# Patient Record
Sex: FEMALE | Race: BLACK OR AFRICAN AMERICAN | Employment: OTHER | URBAN - METROPOLITAN AREA
[De-identification: names, ages, dates, MRNs, and addresses within clinical notes are randomized per-mention and may not be internally consistent; named-entity substitution may affect disease eponyms.]

---

## 2017-01-18 ENCOUNTER — OFFICE VISIT (OUTPATIENT)
Dept: INTERNAL MEDICINE CLINIC | Age: 82
End: 2017-01-18

## 2017-01-18 VITALS
WEIGHT: 133 LBS | HEIGHT: 64 IN | BODY MASS INDEX: 22.71 KG/M2 | SYSTOLIC BLOOD PRESSURE: 131 MMHG | OXYGEN SATURATION: 95 % | DIASTOLIC BLOOD PRESSURE: 69 MMHG | TEMPERATURE: 98.1 F | HEART RATE: 85 BPM

## 2017-01-18 DIAGNOSIS — D50.9 IRON DEFICIENCY ANEMIA, UNSPECIFIED IRON DEFICIENCY ANEMIA TYPE: ICD-10-CM

## 2017-01-18 DIAGNOSIS — E55.9 VITAMIN D DEFICIENCY: ICD-10-CM

## 2017-01-18 DIAGNOSIS — I50.20 SYSTOLIC CONGESTIVE HEART FAILURE, UNSPECIFIED CONGESTIVE HEART FAILURE CHRONICITY: ICD-10-CM

## 2017-01-18 DIAGNOSIS — E78.00 PURE HYPERCHOLESTEROLEMIA: ICD-10-CM

## 2017-01-18 DIAGNOSIS — E87.5 HYPERKALEMIA: ICD-10-CM

## 2017-01-18 DIAGNOSIS — I10 ESSENTIAL HYPERTENSION: ICD-10-CM

## 2017-01-18 NOTE — PATIENT INSTRUCTIONS
Increase vitamin D to 2000units daily     Labs today     Schedule eye exam     Blood pressure pills are coreg 25mg(2of these)  2 times per day, lisinopril 10mg daily , amlodipine 10mg daily

## 2017-01-18 NOTE — MR AVS SNAPSHOT
Visit Information Date & Time Provider Department Dept. Phone Encounter #  
 1/18/2017 10:00 AM Jolena Ganser, [phone] [id number] Follow-up Instructions Return in about 3 months (around 4/18/2017). Upcoming Health Maintenance Date Due DTaP/Tdap/Td series (1 - Tdap) 4/6/2011 EYE EXAM RETINAL OR DILATED Q1 4/21/2015 Pneumococcal 65+ Low/Medium Risk (2 of 2 - PPSV23) 3/20/2016 GLAUCOMA SCREENING Q2Y 4/21/2016 FOOT EXAM Q1 9/16/2016 HEMOGLOBIN A1C Q6M 2/3/2017 LIPID PANEL Q1 4/27/2017 MEDICARE YEARLY EXAM 4/28/2017 MICROALBUMIN Q1 8/3/2017 Allergies as of 1/18/2017  Review Complete On: 1/18/2017 By: Jolena Ganser, MD  
 No Known Allergies Current Immunizations  Reviewed on 1/14/2015 Name Date Pneumococcal Vaccine (Unspecified Type) 3/20/2011  4:54 PM  
 TD Vaccine 4/5/2011 Not reviewed this visit You Were Diagnosed With   
  
 Codes Comments Uncontrolled type 2 diabetes mellitus without complication, without long-term current use of insulin ([facility] [address]    -  Primary ICD-10-CM: E11.65 ICD-9-CM: 250.02 Systolic congestive heart failure, unspecified congestive heart failure chronicity (HCC)     ICD-10-CM: I50.20 ICD-9-CM: 428.20, 428.0 Iron deficiency anemia, unspecified iron deficiency anemia type     ICD-10-CM: D50.9 ICD-9-CM: 280.9 Vitamin D deficiency     ICD-10-CM: E55.9 ICD-9-CM: 268.9 Essential hypertension     ICD-10-CM: I10 
ICD-9-CM: 401.9 Pure hypercholesterolemia     ICD-10-CM: E78.00 ICD-9-CM: 272.0 Hyperkalemia     ICD-10-CM: E87.5 ICD-9-CM: 276.7 Vitals BP Pulse Temp Height(growth percentile) Weight(growth percentile) SpO2  
 131/69 (BP 1 Location: Right arm, BP Patient Position: Sitting) 85 98.1 °F (36.7 °C) (Oral) 5' 4\" (1.626 m) 133 lb (60.3 kg) 95% BMI OB Status Smoking Status 22.83 kg/m2 Postmenopausal Never Smoker BMI and BSA Data Body Mass Index Body Surface Area  
 22.83 kg/m 2 1.65 m 2 Preferred Pharmacy Pharmacy Name Phone [facility] [address], [address] [place] [address] Avenue [phone] Your Updated Medication List  
  
   
This list is accurate as of: 1/18/17 10:19 AM.  Always use your most recent med list.  
  
  
  
  
 alcohol swabs Padm Commonly known as:  BD Single Use Swabs Regular Daily  
  
 amLODIPine 10 mg tablet Commonly known as:  Montoya Mullet TAKE 1 TABLET EVERY DAY  
  
 aspirin delayed-release 81 mg tablet Take  by mouth daily. atorvastatin 10 mg tablet Commonly known as:  LIPITOR  
TAKE 1 TABLET EVERY NIGHT Blood Glucose Control, Normal Soln Commonly known as:  ACCU-CHEK SMARTVIEW CONTRL SOL Daily  
  
 carvedilol 25 mg tablet Commonly known as:  Tony Been Take 2 Tabs by mouth two (2) times daily (with meals). ergocalciferol 50,000 unit capsule Commonly known as:  ERGOCALCIFEROL Take 1 Cap by mouth every seven (7) days. furosemide 20 mg tablet Commonly known as:  LASIX TAKE 1 TABLET DAILY  
  
 glucose blood VI test strips strip Commonly known as:  ACCU-CHEK RISSA PLUS TEST STRP Check glucose twice daily Lancets Misc Commonly known as:  ACCU-CHEK FASTCLIX Check glucose twice daily * lisinopril 10 mg tablet Commonly known as:  Mollie Ripa Take 1 Tab by mouth daily. * lisinopril 20 mg tablet Commonly known as:  PRINIVIL, ZESTRIL  
TAKE 1 TABLET EVERY DAY  
  
 metFORMIN 1,000 mg tablet Commonly known as:  GLUCOPHAGE  
TAKE 1 TABLET TWICE DAILY pneumococcal 13 klaus conj dip 0.5 mL Syrg injection Commonly known as:  PREVNAR 13 (PF)  
0.5 mL by IntraMUSCular route PRIOR TO DISCHARGE for 1 dose. * Notice: This list has 2 medication(s) that are the same as other medications prescribed for you.  Read the directions carefully, and ask your doctor or other care provider to review them with you. We Performed the Following CBC W/O DIFF [05492 CPT(R)] ERYTHROPOIETIN [02148 CPT(R)] FERRITIN [54805 CPT(R)]  DIABETES EYE EXAM [HM6 Custom]  DIABETES FOOT EXAM [HM7 Custom] IRON PROFILE [id number] CPT(R)] LIPID PANEL [80098 CPT(R)] METABOLIC PANEL, COMPREHENSIVE [08763 CPT(R)] MICROALBUMIN, UR, RAND W/ MICROALBUMIN/CREA RATIO [id number] CPT(R)] TSH 3RD GENERATION [68332 CPT(R)] VITAMIN D, 25 HYDROXY [id number] CPT(R)] Follow-up Instructions Return in about 3 months (around 4/18/2017). Patient Instructions Increase vitamin D to 2000units daily Labs today Schedule eye exam  
 
Blood pressure pills are coreg 25mg(2of these)  2 times per day, lisinopril 10mg daily , amlodipine 10mg daily Introducing [facility]! [facility] introduces [employer] patient portal. Now you can access parts of your medical record, email your doctor's office, and request medication refills online. 1. In your internet browser, go to [email or web address] 2. Click on the First Time User? Click Here link in the Sign In box. You will see the New Member Sign Up page. 3. Enter your [employer] Access Code exactly as it appears below. You will not need to use this code after youve completed the sign-up process. If you do not sign up before the expiration date, you must request a new code. · [employer] Access Code: [email or web address] Expires: 4/18/2017 10:19 AM 
 
4. Enter the last four digits of your Social Security Number (xxxx) and Date of Birth (mm/dd/yyyy) as indicated and click Submit. You will be taken to the next sign-up page. 5. Create a [employer] ID. This will be your [employer] login ID and cannot be changed, so think of one that is secure and easy to remember. 6. Create a [facility] [employer] password. You can change your password at any time. 7. Enter your Password Reset Question and Answer. This can be used at a later time if you forget your password. 8. Enter your e-mail address. You will receive e-mail notification when new information is available in [address] 9. Click Sign Up. You can now view and download portions of your medical record. 10. Click the Download Summary menu link to download a portable copy of your medical information. If you have questions, please visit the Frequently Asked Questions section of the [employer] website. Remember, [employer] is NOT to be used for urgent needs. For medical emergencies, dial 911. Now available from your iPhone and Android! Please provide this summary of care documentation to your next provider. Your primary care clinician is listed as Irving Chow. If you have any questions after today's visit, please call [phone].

## 2017-01-18 NOTE — PROGRESS NOTES
HISTORY OF PRESENT ILLNESS  Luana Lucero is a 80 y.o. female. HPI   Last here 8/03/16. Pt is here to f/u on chronic conditions  Pt is overdue for follow up, advised following with me q3 months   Pt brought in pill bottles --reviewed, she has some other pill bottles at home however     BP today is 131/69  She is not monitoring her BP at home  Continues norvasc 10mg, coreg 50mg BID, and lisinopril daily  Pt is not sure which dose of her lisinopril she is taking, had decreased this last visit from 20mg to 10mg but may still be taking her 20mg   She also takes lasix 20mg daily for edema  Recall had decreased lisinopril d/t hyperkalemia    Pt has not been monitoring her BS at home recently   At last check running around 103, highest 108  Continues metformin 1000mg BID  She also takes ASA 81mg daily  Last a1c in August was at goal    Reviewed last labs 8/16  Potassium too high, anemia  Will repeat labs today    Last visit, pt was found to have anemia on labs in 8/16  She is currently taking iron at home   I ordered repeat blood work but she did not complete this     Continues vit D 1000 units daily  Advised her to increase this to 2000 units daily    Wt is stable since last visit   Weight is within normal ranges    I have reminded patient several visits to schedule with cardiology Dr. Olivia Sapp for echo. She still has not scheduled with him. Discussed importance of scheduling f/u with cardiology.   Denies any new swelling in her legs or CAD sxs  Reminded her again in 1/17  Recall significant HF in the past   She will think about completing this   I ordered a repeat ECHO and she will schedule    Continues lipitor 10mg daily for cholesterol -- at goal in April       PREVENTIVE:    Colonoscopy: never had, declines    Pap: due, declines    Mammogram: declines  Dexa: declines  Tdap: 4/05/2011  Pneumovax: 3/20/2011  [id number]: script given  Zostavax: declines    Flu shot: declines    Foot exam: 8/03/16  Microalbumin: 9/15, 8/16 ordered, 1/17 reordered   A1c: 9.8 4/11 , 6/11 6.7, 9/11 5.7, 1/12 5.9, 4/12 5.9, 8/12 6.4, 11/12 6.3, 3/13 6.1, 7/13 6.2, 11/13 6.0, 2/14 6.3, 5/14 6.1, 8/14 6.3, 1/15 6.4, 9/16 5.8 , 4/16 5.9, 8/16 5.9  Eye exam: Dr. Ovi Isbell, 1/04/16, ([phone]), due, will schedule  Lipids: 4/16, LDL 50          Patient Active Problem List    Diagnosis Date Noted    Iron deficiency anemia 08/09/2016    ACP (advance care planning) 04/27/2016    Chronic systolic heart failure ([facility] 05/30/2012    Type II or unspecified type diabetes mellitus without mention of complication, not stated as uncontrolled 05/30/2012    Pulmonary nodules 04/29/2011    CH[facility]congestive [facility] failure) ([facility] 04/15/2011    HTN (hypertension), malignant 03/19/2011    DM (diabetes mellitus), type 2, uncontrolled ([facility] 03/19/2011    Unspecified pleural effusion 03/19/2011    UTI (urinary tract infection) 64/60/4231    Diastolic CHF, acute ([facility] 03/19/2011     Current Outpatient Prescriptions   Medication Sig Dispense Refill    carvedilol (COREG) 25 mg tablet Take 2 Tabs by mouth two (2) times daily (with meals). 60 Tab 0    amLODIPine (NORVASC) 10 mg tablet TAKE 1 TABLET EVERY DAY 90 Tab 1    lisinopril (PRINIVIL, ZESTRIL) 20 mg tablet TAKE 1 TABLET EVERY DAY 90 Tab 1    atorvastatin (LIPITOR) 10 mg tablet TAKE 1 TABLET EVERY NIGHT 90 Tab 3    metFORMIN (GLUCOPHAGE) 1,000 mg tablet TAKE 1 TABLET TWICE DAILY 180 Tab 0    lisinopril (PRINIVIL, ZESTRIL) 10 mg tablet Take 1 Tab by mouth daily. 30 Tab 4    furosemide (LASIX) 20 mg tablet TAKE 1 TABLET DAILY 90 Tab 3    pneumococcal 13 klaus conj dip (PREVNAR 13, PF,) 0.5 mL syrg injection 0.5 mL by IntraMUSCular route PRIOR TO DISCHARGE for 1 dose. 1 Syringe 0    ergocalciferol (ERGOCALCIFEROL) 50,000 unit capsule Take 1 Cap by mouth every seven (7) days.  8 Cap 0    Lancets (ACCU-CHEK FASTCLIX) misc Check glucose twice daily 3 Package 3    glucose blood VI test strips (ACCU-CHEK RISSA PLUS TEST STRP) strip Check glucose twice daily 3 Package 3    Blood Glucose Control, Normal (ACCU-CHEK SMARTVIEW CONTRL SOL) soln Daily 1 each 3    alcohol swabs (BD SINGLE USE SWABS REGULAR) padm Daily 100 each 3    aspirin delayed-release 81 mg tablet Take  by mouth daily. No past surgical history on file. Lab Results  Component Value Date/Time   WBC 8.2 08/03/2016 12:16 PM   HGB 10.6 08/03/2016 12:16 PM   HCT 31.8 08/03/2016 12:16 PM   PLATELET 867 92/68/2674 12:16 PM   MCV 94 08/03/2016 12:16 PM       Lab Results  Component Value Date/Time   Cholesterol, total 143 04/27/2016 09:50 AM   HDL Cholesterol 83 04/27/2016 09:50 AM   LDL, calculated 50 04/27/2016 09:50 AM   Triglyceride 51 04/27/2016 09:50 AM   CHOL/HDL Ratio 3.0 03/20/2011 04:20 AM       Lab Results  Component Value Date/Time   GFR est AA 45 08/03/2016 12:16 PM   GFR est non-AA 39 08/03/2016 12:16 PM   Creatinine (POC) 1.1 04/18/2012 09:18 AM   Creatinine 1.30 08/03/2016 12:16 PM   BUN 23 08/03/2016 12:16 PM   Sodium 140 08/03/2016 12:16 PM   Potassium 5.8 08/03/2016 12:16 PM   Chloride 101 08/03/2016 12:16 PM   CO2 22 08/03/2016 12:16 PM         Review of Systems   Respiratory: Negative for shortness of breath. Cardiovascular: Negative for chest pain and leg swelling. Physical Exam   Constitutional: She is oriented to person, place, and time. She appears well-developed and well-nourished. No distress. HENT:   Head: Normocephalic and atraumatic. Mouth/Throat: Oropharynx is clear and moist. No oropharyngeal exudate. Eyes: Conjunctivae and EOM are normal. Right eye exhibits no discharge. Left eye exhibits no discharge. Neck: Normal range of motion. Neck supple. Cardiovascular: Normal rate, regular rhythm, normal heart sounds and intact distal pulses. Exam reveals no gallop and no friction rub. No murmur heard. Pulmonary/Chest: Effort normal and breath sounds normal. No respiratory distress. She has no wheezes.  She has no rales. She exhibits no tenderness. Musculoskeletal: Normal range of motion. She exhibits edema (1+ pitting BLE). She exhibits no tenderness or deformity. Lymphadenopathy:     She has no cervical adenopathy. Neurological: She is alert and oriented to person, place, and time. Coordination normal.   Skin: Skin is warm and dry. No rash noted. She is not diaphoretic. No erythema. No pallor. Psychiatric: She has a normal mood and affect. Her behavior is normal.       ASSESSMENT and PLAN    ICD-10-CM ICD-9-CM    1. Uncontrolled type 2 diabetes mellitus without complication, without long-term current use of insulin ([facility] [address]    Home readings are okay but infrequent, continues metformin 1000mg BID, advised scheduling eye exam, check a1c today and adjust medications as needed. E11.65 250.02  DIABETES FOOT EXAM       DIABETES EYE EXAM      MICROALBUMIN, UR, RAND W/ MICROALBUMIN/CREA RATIO      CBC W/O DIFF      FERRITIN      IRON PROFILE      METABOLIC PANEL, COMPREHENSIVE      LIPID PANEL      TSH 3RD GENERATION      VITAMIN D, 25 HYDROXY      ERYTHROPOIETIN   2. Systolic congestive heart failure, unspecified congestive heart failure chronicity ([facility] [address]    Currently asymptomatic, continues on lasix 20mg daily, she declines any further evaluation with cardiology and declines ECHO as well, she will let me know if she changes her mind. I50.20 428.20  DIABETES FOOT EXAM     428.0  DIABETES EYE EXAM      MICROALBUMIN, UR, RAND W/ MICROALBUMIN/CREA RATIO      CBC W/O DIFF      FERRITIN      IRON PROFILE      METABOLIC PANEL, COMPREHENSIVE      LIPID PANEL      TSH 3RD GENERATION      VITAMIN D, 25 HYDROXY      ERYTHROPOIETIN   3.  Iron deficiency anemia, unspecified iron deficiency anemia type    New on last set of labs, she is taking iron now, ordered additional labs which she did not do, of note she declines colonoscopy, repeat labs today and evaluate further as needed D50.9 280.9  DIABETES FOOT EXAM       DIABETES EYE EXAM      MICROALBUMIN, UR, RAND W/ MICROALBUMIN/CREA RATIO      CBC W/O DIFF      FERRITIN      IRON PROFILE      METABOLIC PANEL, COMPREHENSIVE      LIPID PANEL      TSH 3RD GENERATION      VITAMIN D, 25 HYDROXY      ERYTHROPOIETIN   4. Vitamin D deficiency    Increase to 2000 units per day E55.9 268.9  DIABETES FOOT EXAM       DIABETES EYE EXAM      MICROALBUMIN, UR, RAND W/ MICROALBUMIN/CREA RATIO      CBC W/O DIFF      FERRITIN      IRON PROFILE      METABOLIC PANEL, COMPREHENSIVE      LIPID PANEL      TSH 3RD GENERATION      VITAMIN D, 25 HYDROXY      ERYTHROPOIETIN   5. Essential hypertension    Controlled on norvasc 10mg, lisinopril 10mg, and coreg 50 mg BID      Of note, she does not have her lisinopril pill bottles here so dosing is questionable, also has both 10mg and 5mg bottles of norvsac, she states she is taking 10mg, wrote correct dosing of all medications down for her   I10 401.9    6. Pure hypercholesterolemia    On lipitor, lipids checked today   E78.00 272.0    7. Hyperkalemia    Decrease lisinopril to 10mg over the phone since last visit, taking lasix daily to help compensate and bring down K levels, check BMP today and adjust dosing as needed  E87.5 276.7         Depression screen reviewed and negative      Written by Marilee Escobar, as dictated by Arley He MD.    Current diagnosis and concerns discussed with pt at length. Understands risks and benefits or current treatment plan and medications and accepts the treatment and medication with any possible risks.   Pt asks appropriate questions which were answered.   Pt instructed to call with any concerns or problems.

## 2017-01-19 LAB
25(OH)D3+25(OH)D2 SERPL-MCNC: 42.9 NG/ML (ref 30–100)
ALBUMIN SERPL-MCNC: 4.1 G/DL (ref 3.5–4.8)
ALBUMIN/CREAT UR: 37.2 MG/G CREAT (ref 0–30)
ALBUMIN/GLOB SERPL: 1.2 {RATIO} (ref 1.1–2.5)
ALP SERPL-CCNC: 75 IU/L (ref 39–117)
ALT SERPL-CCNC: 5 IU/L (ref 0–32)
AST SERPL-CCNC: 11 IU/L (ref 0–40)
BILIRUB SERPL-MCNC: 0.4 MG/DL (ref 0–1.2)
BUN SERPL-MCNC: 27 MG/DL (ref 8–27)
BUN/CREAT SERPL: 16 (ref 11–26)
CALCIUM SERPL-MCNC: 9.3 MG/DL (ref 8.7–10.3)
CHLORIDE SERPL-SCNC: 103 MMOL/L (ref 96–106)
CHOLEST SERPL-MCNC: 140 MG/DL (ref 100–199)
CO2 SERPL-SCNC: 22 MMOL/L (ref 18–29)
CREAT SERPL-MCNC: 1.66 MG/DL (ref 0.57–1)
CREAT UR-MCNC: 91.1 MG/DL
EPO SERPL-ACNC: 7.1 MIU/ML (ref 2.6–18.5)
ERYTHROCYTE [DISTWIDTH] IN BLOOD BY AUTOMATED COUNT: 14.1 % (ref 12.3–15.4)
FERRITIN SERPL-MCNC: 41 NG/ML (ref 15–150)
GLOBULIN SER CALC-MCNC: 3.5 G/DL (ref 1.5–4.5)
GLUCOSE SERPL-MCNC: 195 MG/DL (ref 65–99)
HCT VFR BLD AUTO: 32.6 % (ref 34–46.6)
HDLC SERPL-MCNC: 76 MG/DL
HGB BLD-MCNC: 10.7 G/DL (ref 11.1–15.9)
IRON SATN MFR SERPL: 29 % (ref 15–55)
IRON SERPL-MCNC: 72 UG/DL (ref 27–139)
LDLC SERPL CALC-MCNC: 53 MG/DL (ref 0–99)
MCH RBC QN AUTO: 30.3 PG (ref 26.6–33)
MCHC RBC AUTO-ENTMCNC: 32.8 G/DL (ref 31.5–35.7)
MCV RBC AUTO: 92 FL (ref 79–97)
MICROALBUMIN UR-MCNC: 33.9 UG/ML
PLATELET # BLD AUTO: 240 X10E3/UL (ref 150–379)
POTASSIUM SERPL-SCNC: 5.4 MMOL/L (ref 3.5–5.2)
PROT SERPL-MCNC: 7.6 G/DL (ref 6–8.5)
RBC # BLD AUTO: 3.53 X10E6/UL (ref 3.77–5.28)
SODIUM SERPL-SCNC: 142 MMOL/L (ref 134–144)
TIBC SERPL-MCNC: 245 UG/DL (ref 250–450)
TRIGL SERPL-MCNC: 53 MG/DL (ref 0–149)
TSH SERPL DL<=0.005 MIU/L-ACNC: 1.18 UIU/ML (ref 0.45–4.5)
UIBC SERPL-MCNC: 173 UG/DL (ref 118–369)
VLDLC SERPL CALC-MCNC: 11 MG/DL (ref 5–40)
WBC # BLD AUTO: 7.6 X10E3/UL (ref 3.4–10.8)

## 2017-01-19 NOTE — PROGRESS NOTES
Labs show worsening of ckd and elevated k still     STOP LISINOPRIL    Schedule US kidney for further eval    Repeat bmp in 2-3 weeks    Needs to stop metformin b/c of this--change to januvia 100mg daily for DM  Mild anemia persists, likely related to ckd, will monitor, of note would still rec pursuing colo for screen alejandro in light of anemia (she may refuse has not wanted before)--

## 2017-01-20 NOTE — PROGRESS NOTES
Called, spoke to pt. Two pt identifiers confirmed. Pt informed per Dr. Alysia Brand: Labs show worsening of ckd and elevated k still. STOP LISINOPRIL. Schedule US kidney for further eval. Ordered. Repeat bmp in 2-3 weeks. Ordered. Needs to stop metformin b/c of this--change to januvia 100mg daily for DM   Mild anemia persists, likely related to ckd, will monitor, of note would still rec pursuing colo for screen alejandro in light of anemia (she may refuse has not wanted before)--  Pt verbalized understanding of information discussed w/ no further questions at this time. Lab and med ordered.

## 2017-02-26 RX ORDER — AMLODIPINE BESYLATE 10 MG/1
TABLET ORAL
Qty: 90 TAB | Refills: 1 | Status: SHIPPED | OUTPATIENT
Start: 2017-02-26 | End: 2020-01-01 | Stop reason: DRUGHIGH

## 2017-02-26 RX ORDER — LISINOPRIL 20 MG/1
TABLET ORAL
Qty: 90 TAB | Refills: 1 | OUTPATIENT
Start: 2017-02-26

## 2017-02-27 NOTE — TELEPHONE ENCOUNTER
Did not fill lisinopril --this was stopped in January    She was to go back to lab for repeat bmp (add a1c to labs as well )--she has not yet gone back to lab --have her go this week for both tests    Will see her as scheduled in April     Make sure she has stopped metformin and started [place] [facility] [place] as discussed over the phone

## 2020-01-01 ENCOUNTER — HOSPITAL ENCOUNTER (EMERGENCY)
Age: 85
Discharge: HOME | End: 2020-08-18
Attending: STUDENT IN AN ORGANIZED HEALTH CARE EDUCATION/TRAINING PROGRAM
Payer: MEDICARE

## 2020-01-01 ENCOUNTER — VIRTUAL VISIT (OUTPATIENT)
Dept: INTERNAL MEDICINE CLINIC | Age: 85
End: 2020-01-01
Payer: MEDICARE

## 2020-01-01 ENCOUNTER — APPOINTMENT (OUTPATIENT)
Dept: GENERAL RADIOLOGY | Age: 85
End: 2020-01-01
Attending: STUDENT IN AN ORGANIZED HEALTH CARE EDUCATION/TRAINING PROGRAM
Payer: MEDICARE

## 2020-01-01 VITALS
OXYGEN SATURATION: 96 % | RESPIRATION RATE: 21 BRPM | SYSTOLIC BLOOD PRESSURE: 186 MMHG | DIASTOLIC BLOOD PRESSURE: 100 MMHG | TEMPERATURE: 99.4 F | BODY MASS INDEX: 21.36 KG/M2 | WEIGHT: 132.94 LBS | HEART RATE: 112 BPM | HEIGHT: 66 IN

## 2020-01-01 DIAGNOSIS — E78.2 MIXED HYPERLIPIDEMIA: ICD-10-CM

## 2020-01-01 DIAGNOSIS — I10 HTN (HYPERTENSION), MALIGNANT: Primary | ICD-10-CM

## 2020-01-01 DIAGNOSIS — Z00.00 MEDICARE ANNUAL WELLNESS VISIT, INITIAL: Primary | ICD-10-CM

## 2020-01-01 DIAGNOSIS — I10 ESSENTIAL HYPERTENSION: ICD-10-CM

## 2020-01-01 DIAGNOSIS — D50.9 IRON DEFICIENCY ANEMIA, UNSPECIFIED IRON DEFICIENCY ANEMIA TYPE: ICD-10-CM

## 2020-01-01 DIAGNOSIS — E11.65 UNCONTROLLED TYPE 2 DIABETES MELLITUS WITH HYPERGLYCEMIA (HCC): ICD-10-CM

## 2020-01-01 DIAGNOSIS — I50.31 DIASTOLIC CHF, ACUTE (HCC): ICD-10-CM

## 2020-01-01 DIAGNOSIS — R53.1 GENERALIZED WEAKNESS: Primary | ICD-10-CM

## 2020-01-01 DIAGNOSIS — I10 HTN (HYPERTENSION), MALIGNANT: ICD-10-CM

## 2020-01-01 DIAGNOSIS — I50.42 CHRONIC COMBINED SYSTOLIC AND DIASTOLIC CONGESTIVE HEART FAILURE (HCC): ICD-10-CM

## 2020-01-01 DIAGNOSIS — I50.22 CHRONIC SYSTOLIC HEART FAILURE (HCC): Primary | ICD-10-CM

## 2020-01-01 DIAGNOSIS — E11.65 UNCONTROLLED TYPE 2 DIABETES MELLITUS WITH HYPERGLYCEMIA (HCC): Primary | ICD-10-CM

## 2020-01-01 LAB
ALBUMIN SERPL-MCNC: 3.5 G/DL (ref 3.5–5)
ALBUMIN/GLOB SERPL: 0.8 {RATIO} (ref 1.1–2.2)
ALP SERPL-CCNC: 75 U/L (ref 45–117)
ALT SERPL-CCNC: 11 U/L (ref 12–78)
ANION GAP SERPL CALC-SCNC: 6 MMOL/L (ref 5–15)
AST SERPL-CCNC: 18 U/L (ref 15–37)
ATRIAL RATE: 110 BPM
BASOPHILS # BLD: 0 K/UL (ref 0–0.1)
BASOPHILS NFR BLD: 1 % (ref 0–1)
BILIRUB SERPL-MCNC: 0.9 MG/DL (ref 0.2–1)
BUN SERPL-MCNC: 11 MG/DL (ref 6–20)
BUN/CREAT SERPL: 11 (ref 12–20)
CALCIUM SERPL-MCNC: 8.3 MG/DL (ref 8.5–10.1)
CALCULATED P AXIS, ECG09: -17 DEGREES
CALCULATED R AXIS, ECG10: 77 DEGREES
CALCULATED T AXIS, ECG11: -82 DEGREES
CHLORIDE SERPL-SCNC: 109 MMOL/L (ref 97–108)
CO2 SERPL-SCNC: 26 MMOL/L (ref 21–32)
CREAT SERPL-MCNC: 1.03 MG/DL (ref 0.55–1.02)
DIAGNOSIS, 93000: NORMAL
DIFFERENTIAL METHOD BLD: NORMAL
EOSINOPHIL # BLD: 0.2 K/UL (ref 0–0.4)
EOSINOPHIL NFR BLD: 2 % (ref 0–7)
ERYTHROCYTE [DISTWIDTH] IN BLOOD BY AUTOMATED COUNT: 14.4 % (ref 11.5–14.5)
GLOBULIN SER CALC-MCNC: 4.2 G/DL (ref 2–4)
GLUCOSE SERPL-MCNC: 135 MG/DL (ref 65–100)
HCT VFR BLD AUTO: 36.6 % (ref 35–47)
HGB BLD-MCNC: 11.6 G/DL (ref 11.5–16)
IMM GRANULOCYTES # BLD AUTO: 0 K/UL (ref 0–0.04)
IMM GRANULOCYTES NFR BLD AUTO: 0 % (ref 0–0.5)
LYMPHOCYTES # BLD: 1.5 K/UL (ref 0.8–3.5)
LYMPHOCYTES NFR BLD: 19 % (ref 12–49)
MCH RBC QN AUTO: 30.1 PG (ref 26–34)
MCHC RBC AUTO-ENTMCNC: 31.7 G/DL (ref 30–36.5)
MCV RBC AUTO: 94.8 FL (ref 80–99)
MONOCYTES # BLD: 0.7 K/UL (ref 0–1)
MONOCYTES NFR BLD: 9 % (ref 5–13)
NEUTS SEG # BLD: 5.4 K/UL (ref 1.8–8)
NEUTS SEG NFR BLD: 69 % (ref 32–75)
NRBC # BLD: 0 K/UL (ref 0–0.01)
NRBC BLD-RTO: 0 PER 100 WBC
P-R INTERVAL, ECG05: 150 MS
PLATELET # BLD AUTO: 202 K/UL (ref 150–400)
PMV BLD AUTO: 12 FL (ref 8.9–12.9)
POTASSIUM SERPL-SCNC: 3.5 MMOL/L (ref 3.5–5.1)
PROT SERPL-MCNC: 7.7 G/DL (ref 6.4–8.2)
Q-T INTERVAL, ECG07: 376 MS
QRS DURATION, ECG06: 108 MS
QTC CALCULATION (BEZET), ECG08: 508 MS
RBC # BLD AUTO: 3.86 M/UL (ref 3.8–5.2)
SODIUM SERPL-SCNC: 141 MMOL/L (ref 136–145)
TROPONIN I SERPL-MCNC: <0.05 NG/ML
VENTRICULAR RATE, ECG03: 110 BPM
WBC # BLD AUTO: 7.8 K/UL (ref 3.6–11)

## 2020-01-01 PROCEDURE — 80053 COMPREHEN METABOLIC PANEL: CPT

## 2020-01-01 PROCEDURE — 1090F PRES/ABSN URINE INCON ASSESS: CPT | Performed by: INTERNAL MEDICINE

## 2020-01-01 PROCEDURE — 99285 EMERGENCY DEPT VISIT HI MDM: CPT

## 2020-01-01 PROCEDURE — G8427 DOCREV CUR MEDS BY ELIG CLIN: HCPCS | Performed by: INTERNAL MEDICINE

## 2020-01-01 PROCEDURE — 1101F PT FALLS ASSESS-DOCD LE1/YR: CPT | Performed by: INTERNAL MEDICINE

## 2020-01-01 PROCEDURE — 99204 OFFICE O/P NEW MOD 45 MIN: CPT | Performed by: INTERNAL MEDICINE

## 2020-01-01 PROCEDURE — G8432 DEP SCR NOT DOC, RNG: HCPCS | Performed by: INTERNAL MEDICINE

## 2020-01-01 PROCEDURE — G8756 NO BP MEASURE DOC: HCPCS | Performed by: INTERNAL MEDICINE

## 2020-01-01 PROCEDURE — 84484 ASSAY OF TROPONIN QUANT: CPT

## 2020-01-01 PROCEDURE — G8400 PT W/DXA NO RESULTS DOC: HCPCS | Performed by: INTERNAL MEDICINE

## 2020-01-01 PROCEDURE — 85025 COMPLETE CBC W/AUTO DIFF WBC: CPT

## 2020-01-01 PROCEDURE — G0438 PPPS, INITIAL VISIT: HCPCS | Performed by: INTERNAL MEDICINE

## 2020-01-01 PROCEDURE — G8510 SCR DEP NEG, NO PLAN REQD: HCPCS | Performed by: INTERNAL MEDICINE

## 2020-01-01 PROCEDURE — 99213 OFFICE O/P EST LOW 20 MIN: CPT | Performed by: INTERNAL MEDICINE

## 2020-01-01 PROCEDURE — 74011250636 HC RX REV CODE- 250/636: Performed by: STUDENT IN AN ORGANIZED HEALTH CARE EDUCATION/TRAINING PROGRAM

## 2020-01-01 PROCEDURE — 93005 ELECTROCARDIOGRAM TRACING: CPT

## 2020-01-01 PROCEDURE — 99214 OFFICE O/P EST MOD 30 MIN: CPT | Performed by: INTERNAL MEDICINE

## 2020-01-01 PROCEDURE — G8536 NO DOC ELDER MAL SCRN: HCPCS | Performed by: INTERNAL MEDICINE

## 2020-01-01 PROCEDURE — 36415 COLL VENOUS BLD VENIPUNCTURE: CPT

## 2020-01-01 PROCEDURE — G8420 CALC BMI NORM PARAMETERS: HCPCS | Performed by: INTERNAL MEDICINE

## 2020-01-01 RX ORDER — IBUPROFEN 200 MG
CAPSULE ORAL
Qty: 100 STRIP | Refills: 1 | Status: SHIPPED | OUTPATIENT
Start: 2020-01-01 | End: 2021-01-01

## 2020-01-01 RX ORDER — LANCETS
EACH MISCELLANEOUS
Qty: 100 EACH | Refills: 11 | Status: SHIPPED | OUTPATIENT
Start: 2020-01-01 | End: 2021-01-01

## 2020-01-01 RX ORDER — ATORVASTATIN CALCIUM 10 MG/1
TABLET, FILM COATED ORAL
Qty: 90 TAB | Refills: 1 | Status: SHIPPED | OUTPATIENT
Start: 2020-01-01

## 2020-01-01 RX ORDER — AMLODIPINE BESYLATE 5 MG/1
5 TABLET ORAL DAILY
Qty: 90 TAB | Refills: 1 | Status: SHIPPED | OUTPATIENT
Start: 2020-01-01 | End: 2020-01-01

## 2020-01-01 RX ORDER — AMLODIPINE BESYLATE 5 MG/1
5 TABLET ORAL DAILY
Qty: 30 TAB | Refills: 2 | Status: SHIPPED | OUTPATIENT
Start: 2020-01-01 | End: 2020-01-01 | Stop reason: SDUPTHER

## 2020-01-01 RX ORDER — INSULIN PUMP SYRINGE, 3 ML
EACH MISCELLANEOUS
Qty: 1 KIT | Refills: 0 | Status: SHIPPED | OUTPATIENT
Start: 2020-01-01 | End: 2021-01-01

## 2020-01-01 RX ORDER — BLOOD SUGAR DIAGNOSTIC
STRIP MISCELLANEOUS
Qty: 100 STRIP | Refills: 3 | Status: SHIPPED | OUTPATIENT
Start: 2020-01-01 | End: 2021-01-01

## 2020-01-01 RX ORDER — CARVEDILOL 25 MG/1
25 TABLET ORAL 2 TIMES DAILY WITH MEALS
Qty: 180 TAB | Refills: 1 | Status: SHIPPED | OUTPATIENT
Start: 2020-01-01 | End: 2021-01-01 | Stop reason: ALTCHOICE

## 2020-01-01 RX ORDER — BLOOD-GLUCOSE METER
EACH MISCELLANEOUS
Qty: 1 EACH | Refills: 0 | Status: SHIPPED | OUTPATIENT
Start: 2020-01-01 | End: 2021-01-01

## 2020-01-01 RX ORDER — BLOOD-GLUCOSE CONTROL, NORMAL
EACH MISCELLANEOUS
Qty: 1 PACKAGE | Refills: 1 | Status: SHIPPED | OUTPATIENT
Start: 2020-01-01 | End: 2021-01-01

## 2020-01-01 RX ORDER — CARVEDILOL 25 MG/1
25 TABLET ORAL 2 TIMES DAILY WITH MEALS
Qty: 60 TAB | Refills: 2 | Status: SHIPPED | OUTPATIENT
Start: 2020-01-01 | End: 2020-01-01 | Stop reason: SDUPTHER

## 2020-01-01 RX ORDER — ATORVASTATIN CALCIUM 10 MG/1
TABLET, FILM COATED ORAL
Qty: 90 TAB | Refills: 1 | Status: SHIPPED | OUTPATIENT
Start: 2020-01-01 | End: 2020-01-01 | Stop reason: SDUPTHER

## 2020-01-01 RX ADMIN — SODIUM CHLORIDE 500 ML: 900 INJECTION, SOLUTION INTRAVENOUS at 13:53

## 2020-08-18 NOTE — ED NOTES
Pt departed the ED by wheelchair. Pt signed informed refusal and left the ED agaisnt medical advice.   A&Ox4

## 2020-08-18 NOTE — ED NOTES
EMERGENCY DEPARTMENT HISTORY AND PHYSICAL EXAM 
 
 
Date: 8/18/2020 Patient Name: Gila Charlton History of Presenting Illness Chief Complaint Patient presents with  Fatigue Pt arrived to ED from home with fatigue x 2 days. HPI: Gila Charlton, 82 y.o. female presents to the ED with cc of generalized weakness. She states that this has been going on for the past 2 days. She denies any other pain or complaints. Denies any fevers, cough, congestion, abdominal pain, shortness of breath, chest pain, vomiting or diarrhea. Denies any dysuria or hematuria. Her daughter at bedside also does state that she has seemed a little less active than normal.  She denies any falls. She did run out of her medications 1 week ago, and thinks that this might be contributing to her symptoms. Januvia, amlodipine, Coreg, lisinopril, atorvastatin, metformin, lisinopril, Lasix, aspirin per chart review There are no other complaints, changes, or physical findings at this time. PCP: Desire Clemens MD 
 
No current facility-administered medications on file prior to encounter. Current Outpatient Medications on File Prior to Encounter Medication Sig Dispense Refill  amLODIPine (NORVASC) 10 mg tablet TAKE 1 TABLET EVERY DAY 90 Tab 1  
 SITagliptin (JANUVIA) 100 mg tablet Take 1 Tab by mouth daily. 30 Tab 3  carvedilol (COREG) 25 mg tablet Take 2 Tabs by mouth two (2) times daily (with meals). 60 Tab 0  
 lisinopril (PRINIVIL, ZESTRIL) 20 mg tablet TAKE 1 TABLET EVERY DAY 90 Tab 1  
 atorvastatin (LIPITOR) 10 mg tablet TAKE 1 TABLET EVERY NIGHT 90 Tab 3  
 metFORMIN (GLUCOPHAGE) 1,000 mg tablet TAKE 1 TABLET TWICE DAILY 180 Tab 0  
 lisinopril (PRINIVIL, ZESTRIL) 10 mg tablet Take 1 Tab by mouth daily. 30 Tab 4  furosemide (LASIX) 20 mg tablet TAKE 1 TABLET DAILY 90 Tab 3  pneumococcal 13 klaus conj dip (PREVNAR 13, PF,) 0.5 mL syrg injection 0.5 mL by IntraMUSCular route PRIOR TO DISCHARGE for 1 dose. 1 Syringe 0  
 ergocalciferol (ERGOCALCIFEROL) 50,000 unit capsule Take 1 Cap by mouth every seven (7) days. 8 Cap 0  
 Lancets (ACCU-CHEK FASTCLIX) misc Check glucose twice daily 3 Package 3  
 glucose blood VI test strips (ACCU-CHEK RISSA PLUS TEST STRP) strip Check glucose twice daily 3 Package 3  Blood Glucose Control, Normal (ACCU-CHEK SMARTVIEW CONTRL SOL) soln Daily 1 each 3  
 alcohol swabs (BD SINGLE USE SWABS REGULAR) padm Daily 100 each 3  
 aspirin delayed-release 81 mg tablet Take  by mouth daily. Past History Past Medical History: 
Past Medical History:  
Diagnosis Date  CHF (congestive heart failure) ([facility] 4/15/2011  CHF (congestive heart failure) ([facility] 4/15/2011  Diabetes ([facility]  Diastolic CHF, acute ([facility] 3/19/2011  Hypertension Past Surgical History: No past surgical history on file. Family History: 
Family History Problem Relation Age of Onset  Heart Disease Father  Hypertension Sister  Diabetes Sister  Asthma Sister  Diabetes Brother  Hypertension Brother  Hypertension Sister  Heart Disease Sister  Diabetes Brother  Hypertension Brother Social History: 
Social History Tobacco Use  Smoking status: Never Smoker  Smokeless tobacco: Never Used Substance Use Topics  Alcohol use: No  
 Drug use: No  
 
 
Allergies: 
No Known Allergies Review of Systems  
no fever 
no eye pain 
no ear pain 
no shortness of breath 
no chest pain 
no abdominal pain 
no dysuria 
no leg pain 
no rash 
no lymphadenopathy 
no weight loss Physical Exam  
Physical Exam 
Constitutional:   
   Appearance: Normal appearance. HENT:  
   Head: Normocephalic and atraumatic. Nose: Nose normal.  
   Mouth/Throat:  
   Mouth: Mucous membranes are moist.  
Eyes:  
   Extraocular Movements: Extraocular movements intact. Pupils: Pupils are equal, round, and reactive to light. Neck: Musculoskeletal: Neck supple. Cardiovascular:  
   Rate and Rhythm: Normal rate and regular rhythm. Pulmonary:  
   Effort: Pulmonary effort is normal. No respiratory distress. Breath sounds: Normal breath sounds. No wheezing. Abdominal:  
   General: Abdomen is flat. There is no distension. Tenderness: There is no abdominal tenderness. Musculoskeletal: Normal range of motion. General: No swelling. Skin: 
   General: Skin is warm and dry. Neurological:  
   General: No focal deficit present. Mental Status: She is alert and oriented to person, place, and time. Cranial Nerves: No cranial nerve deficit. Psychiatric:     
   Mood and Affect: Mood normal.  
 
 
 
Diagnostic Study Results Labs - Recent Results (from the past 24 hour(s)) CBC WITH AUTOMATED DIFF Collection Time: 08/18/20 10:28 AM  
Result Value Ref Range WBC 7.8 3.6 - 11.0 K/uL  
 RBC 3.86 3.80 - 5.20 M/uL  
 HGB 11.6 11.5 - 16.0 g/dL HCT 36.6 35.0 - 47.0 % MCV 94.8 80.0 - 99.0 FL  
 MCH 30.1 26.0 - 34.0 PG  
 MCHC 31.7 30.0 - 36.5 g/dL  
 RDW 14.4 11.5 - 14.5 % PLATELET 499 514 - 579 K/uL MPV 12.0 8.9 - 12.9 FL  
 NRBC 0.0 0  WBC ABSOLUTE NRBC 0.00 0.00 - 0.01 K/uL NEUTROPHILS 69 32 - 75 % LYMPHOCYTES 19 12 - 49 % MONOCYTES 9 5 - 13 % EOSINOPHILS 2 0 - 7 % BASOPHILS 1 0 - 1 % IMMATURE GRANULOCYTES 0 0.0 - 0.5 % ABS. NEUTROPHILS 5.4 1.8 - 8.0 K/UL  
 ABS. LYMPHOCYTES 1.5 0.8 - 3.5 K/UL  
 ABS. MONOCYTES 0.7 0.0 - 1.0 K/UL  
 ABS. EOSINOPHILS 0.2 0.0 - 0.4 K/UL  
 ABS. BASOPHILS 0.0 0.0 - 0.1 K/UL  
 ABS. IMM. GRANS. 0.0 0.00 - 0.04 K/UL  
 DF AUTOMATED    
EKG, 12 LEAD, INITIAL Collection Time: 08/18/20 12:00 PM  
Result Value Ref Range Ventricular Rate 110 BPM  
 Atrial Rate 110 BPM  
 P-R Interval 150 ms QRS Duration 108 ms  Q-T Interval 376 ms  
 QTC Calculation (Bezet) 508 ms Calculated P Axis -17 degrees Calculated R Axis 77 degrees Calculated T Axis -82 degrees Diagnosis Sinus tachycardia Anteroseptal infarct , age undetermined T wave abnormality, consider inferior ischemia No previous ECGs available TROPONIN I Collection Time: 08/18/20  1:08 PM  
Result Value Ref Range Troponin-I, Qt. <0.05 <0.05 ng/mL METABOLIC PANEL, COMPREHENSIVE Collection Time: 08/18/20  1:08 PM  
Result Value Ref Range Sodium 141 136 - 145 mmol/L Potassium 3.5 3.5 - 5.1 mmol/L Chloride 109 (H) 97 - 108 mmol/L  
 CO2 26 21 - 32 mmol/L Anion gap 6 5 - 15 mmol/L Glucose 135 (H) 65 - 100 mg/dL BUN 11 6 - 20 MG/DL Creatinine 1.03 (H) 0.55 - 1.02 MG/DL  
 BUN/Creatinine ratio 11 (L) 12 - 20 GFR est AA >60 >60 ml/min/1.73m2 GFR est non-AA 51 (L) >60 ml/min/1.73m2 Calcium 8.3 (L) 8.5 - 10.1 MG/DL Bilirubin, total 0.9 0.2 - 1.0 MG/DL  
 ALT (SGPT) 11 (L) 12 - 78 U/L  
 AST (SGOT) 18 15 - 37 U/L Alk. phosphatase 75 45 - 117 U/L Protein, total 7.7 6.4 - 8.2 g/dL Albumin 3.5 3.5 - 5.0 g/dL Globulin 4.2 (H) 2.0 - 4.0 g/dL A-G Ratio 0.8 (L) 1.1 - 2.2 Radiologic Studies -  
XR CHEST PORT    (Results Pending) CT Results  (Last 48 hours) None CXR Results  (Last 48 hours) None Medical Decision Making I am the first provider for this patient. I reviewed the vital signs, available nursing notes, past medical history, past surgical history, family history and social history. Vital Signs-Reviewed the patient's vital signs. Patient Vitals for the past 24 hrs: 
 Temp Pulse Resp BP SpO2  
08/18/20 1419  (!) 112 21 (!) 186/100 96 % 08/18/20 1414  (!) 105 26  98 % 08/18/20 1230  (!) 117 27 176/87 96 % 08/18/20 1200  (!) 112 30 167/66 97 % 08/18/20 1017 99.4 °F (37.4 °C) (!) 114 16 (!) 131/114 100 % Provider Notes (Medical Decision Making):  
 79-year-old female presenting with generalized weakness. She denies any specific pain or complaints, however given her comorbidities and age my differential is large. I am concerned for possible electrolyte or metabolic abnormalities, cannot rule out cardiac cause given her past medical history. No overt signs of volume overload without any pitting edema or shortness of breath, lungs are clear. Differential includes UTI, less likely pneumonia. EKG is performed at 12: 00. It shows sinus tachycardia. There are T wave inversions in lead V6 as well as the inferior leads. No acute ST elevation or depression. GA interval 150, , QTc of 508. CBC is negative for leukocytosis or anemia, BMP shows elevated creatinine at 1.03, improved from prior. On reevaluation, the patient has refused a chest x-ray. She refuses to give urine. She is very frustrated that she has been in the emergency department for this long. She states that she would like to leave. I have spoken with her at length as well as her daughter to try to convince her to stay. On reevaluation, the patient is resting comfortably. She continues to state that she would like to leave. She exhibits consistent and logical reasoning and judgment. As stated prior she is fully oriented, and appropriate. I have explained the risks of leaving [address], including serious morbidity and even death especially given her continued tachycardia, however she continues to state that she wants to leave. Her daughter will call her primary care doctor to have her seen as soon as possible, and they agree to come back to the emergency department for any worsening in status. ED Course:  
 
Initial assessment performed. The patients presenting problems have been discussed, and they are in agreement with the care plan formulated and outlined with them. I have encouraged them to ask questions as they arise throughout their visit. Critical Care Time:  
 
 
Disposition: 
 
Home AGAINST MEDICAL ADVICE PLAN: 
1. Current Discharge Medication List  
  
 
2. Follow-up Information Follow up With Specialties Details Why Contact Info Thalia Min MD Internal Medicine Call today  DallinJoselyn Andre[address] [facility] 
[phone] [facility] EMERGENCY DEPT Emergency Medicine  As needed, If symptoms worsen [address] [facility] 
[phone] Return to ED if worse Diagnosis Clinical Impression: Acute generalized weakness

## 2020-09-22 NOTE — PROGRESS NOTES
HISTORY OF PRESENT ILLNESS Aniceto Go is a 82 y.o. female. HPI Here to re-establish care Last here 1/18/17. This is an established visit completed with telemedicine was completed with video assist 
the patient acknowledges and agrees to this method of visitation fabrice Presents with daughter who provides hx 
  
She was in ED 8/18/20 for fatigue BP in /100 She used to be on  norvasc 10mg, coreg 50mg BID- has not been on these in several years was also on lisinopril in the past but it had been stopped due to potassium issues and climbing creatinine Will restart norvasc and coreg Recall had decreased lisinopril d/t hyperkalemia She used to take lasix 20mg daily for edema No swelling currently so not needed 
  She is diabetic No recent BS checks Discussed monitoring She used to be on metformin 1000mg BID this was changed to [place] due to renal impairment- has not been on this in several years She also takes ASA 81mg daily 
  
Ordered labs 
  She used to take vit D 1000 units daily 
  
Wt lov was 132 lb She used to see Dr. Lynn Isaac (cardio) for CAD and cardiomyopathy but is long overdue for follow-up recall significant HF in the past  
  
She used to take lipitor 10mg daily for cholesterol  - will restart She just stopped taking all of her medication somewhere around 2017 just did not feel it taking them Denies falls Doesn't drink alcohol No hearing issues Pt lives with daughter and  Pt is somewhat functionally independent No memory concerns Knows the month, date, year, location Can recall 3/3 objects SDM is  and daughter PMHx: diabetes, htn FMHx: brothers- diabetes, htn sister-htn dad -heart disease PSHx: none SHx: doesn't drink alcohol, doesn't smoke, ,  
  
PREVENTIVE:   
Colonoscopy: never had, declines   
Pap: due, declines   
Mammogram: declines Dexa: declines Tdap: 4/05/2011 Pneumovax: 3/20/2011 [id number]: script given Zostavax: declines   
Flu shot: declines   
Foot exam: 8/03/16 Microalbumin: 1/17 A1c: 9.8 4/11 , 6/11 6.7, 9/11 5.7, 1/12 5.9, 4/12 5.9, 8/12 6.4, 11/12 6.3, 3/13 6.1, 7/13 6.2, 11/13 6.0, 2/14 6.3, 5/14 6.1, 8/14 6.3, 1/15 6.4, 9/16 5.8 , 4/16 5.9, 8/16 5.9 Eye exam: Dr. Kellen Gallagher, 1/04/16, ([phone]), due, will schedule Lipids: 1/17, LDL 53 Patient Active Problem List  
 Diagnosis Date Noted  Iron deficiency anemia 08/09/2016  ACP (advance care planning) 04/27/2016  [facility] 05/30/2012  Type II or unspecified type diabetes mellitus without mention of complication, not stated as uncontrolled 05/30/2012  Pulmonary nodules 04/29/2011  CHF (congestive heart failure) ([facility] 04/15/2011  
 HTN (hypertension), malignant 03/19/2011  DM (diabetes mellitus), type 2, uncontrolled ([facility] 03/19/2011  Unspecified pleural effusion 03/19/2011  UTI (urinary tract infection) 03/19/2011  Diastolic CHF, acute ([facility] 03/19/2011 Current Outpatient Medications Medication Sig Dispense Refill  carvediloL (COREG) 25 mg tablet Take 1 Tab by mouth two (2) times daily (with meals). 60 Tab 2  
 atorvastatin (LIPITOR) 10 mg tablet TAKE 1 TABLET EVERY NIGHT 90 Tab 1  
 amLODIPine (NORVASC) 5 mg tablet Take 1 Tab by mouth daily. 30 Tab 2  
 SITagliptin (JANUVIA) 100 mg tablet Take 1 Tab by mouth daily. 30 Tab 3  
 metFORMIN (GLUCOPHAGE) 1,000 mg tablet TAKE 1 TABLET TWICE DAILY 180 Tab 0  
 lisinopril (PRINIVIL, ZESTRIL) 10 mg tablet Take 1 Tab by mouth daily. 30 Tab 4  furosemide (LASIX) 20 mg tablet TAKE 1 TABLET DAILY 90 Tab 3  
 ergocalciferol (ERGOCALCIFEROL) 50,000 unit capsule Take 1 Cap by mouth every seven (7) days. 8 Cap 0  
 lisinopril (PRINIVIL, ZESTRIL) 20 mg tablet TAKE 1 TABLET EVERY DAY 90 Tab 1  pneumococcal 13 klaus conj dip (PREVNAR 13, PF,) 0.5 mL syrg injection 0.5 mL by IntraMUSCular route PRIOR TO DISCHARGE for 1 dose. 1 Syringe 0  
 Lancets (ACCU-CHEK FASTCLIX) misc Check glucose twice daily 3 Package 3  
 glucose blood VI test strips (ACCU-CHEK RISSA PLUS TEST STRP) strip Check glucose twice daily 3 Package 3  Blood Glucose Control, Normal (ACCU-CHEK SMARTVIEW CONTRL SOL) soln Daily 1 each 3  
 alcohol swabs (BD SINGLE USE SWABS REGULAR) padm Daily 100 each 3  
 aspirin delayed-release 81 mg tablet Take  by mouth daily. History reviewed. No pertinent surgical history. Lab Results Component Value Date/Time WBC 7.8 08/18/2020 10:28 AM  
 HGB 11.6 08/18/2020 10:28 AM  
 HCT 36.6 08/18/2020 10:28 AM  
 PLATELET 275 46/29/6525 10:28 AM  
 MCV 94.8 08/18/2020 10:28 AM  
 
Lab Results Component Value Date/Time Cholesterol, total 140 01/18/2017 10:36 AM  
 HDL Cholesterol 76 01/18/2017 10:36 AM  
 LDL, calculated 53 01/18/2017 10:36 AM  
 Triglyceride 53 01/18/2017 10:36 AM  
 CHOL/HDL Ratio 3.0 03/20/2011 04:20 AM  
 
Lab Results Component Value Date/Time GFR est non-AA 51 (L) 08/18/2020 01:08 PM  
 GFRNA, POC 52 (L) 04/18/2012 09:18 AM  
 GFR est AA >60 08/18/2020 01:08 PM  
 GFRAA, POC >60 04/18/2012 09:18 AM  
 Creatinine 1.03 (H) 08/18/2020 01:08 PM  
 Creatinine (POC) 1.1 04/18/2012 09:18 AM  
 BUN 11 08/18/2020 01:08 PM  
 Sodium 141 08/18/2020 01:08 PM  
 Potassium 3.5 08/18/2020 01:08 PM  
 Chloride 109 (H) 08/18/2020 01:08 PM  
 CO2 26 08/18/2020 01:08 PM  
  
 
Review of Systems Constitutional: Negative for chills and fever. HENT: Negative for hearing loss and tinnitus. Eyes: Negative for blurred vision and double vision. Respiratory: Negative for shortness of breath and wheezing. Cardiovascular: Negative for chest pain, palpitations and leg swelling. Gastrointestinal: Negative for nausea and vomiting. Genitourinary: Negative for dysuria and frequency. Musculoskeletal: Negative for back pain, falls and joint pain. Skin: Negative for itching and rash. Neurological: Negative for dizziness, loss of consciousness and headaches. Psychiatric/Behavioral: Negative for depression. The patient is not nervous/anxious. Physical Exam 
Constitutional:   
   General: She is not in acute distress. Appearance: Normal appearance. She is not ill-appearing, toxic-appearing or diaphoretic. HENT:  
   Head: Normocephalic and atraumatic. Eyes:  
   General:     
   Right eye: No discharge. Left eye: No discharge. Conjunctiva/sclera: Conjunctivae normal.  
Pulmonary:  
   Effort: No respiratory distress. Neurological:  
   Mental Status: She is alert and oriented to person, place, and time. Mental status is at baseline. Gait: Gait normal.  
Psychiatric:     
   Mood and Affect: Mood normal.     
   Behavior: Behavior normal.  
 
 
 
ASSESSMENT and PLAN 
  ICD-10-CM ICD-9-CM 1. Medicare annual wellness visit, initial 
 
 
 
 
 
  Z00.00 V70.0 LIPID PANEL  
   METABOLIC PANEL, COMPREHENSIVE MICROALBUMIN, UR, RAND W/ MICROALB/CREAT RATIO  
   HEMOGLOBIN A1C WITH EAG  
   TSH 3RD GENERATION 2. Diastolic CHF, acute ([facility] [address] [address]  I50.31 428.31 LIPID PANEL  
  411.5 METABOLIC PANEL, COMPREHENSIVE Previously on Lasix in addition to ace inhibitor and beta-blocker in the past previously saw cardiology but not recently Would likely benefit from a repeat echocardiogram--will order We will start by treating blood pressure and getting basic labs and will determine if she is to go back to cardiology   MICROALBUMIN, UR, RAND W/ MICROALB/CREAT RATIO  
   HEMOGLOBIN A1C WITH EAG  
   TSH 3RD GENERATION 3. Uncontrolled type 2 diabetes mellitus with hyperglycemia (HCC)  E11.65 250.02 LIPID PANEL  
   METABOLIC PANEL, COMPREHENSIVE Patient was previously on metformin she has not taken any of her diabetic medications in several years and is not checking her blood sugar currently unknown control We will check A1c have her start monitoring blood sugars and determine appropriate treatment option she most likely can go back on metformin her recent creatinines have been normal   MICROALBUMIN, UR, RAND W/ MICROALB/CREAT RATIO  
   HEMOGLOBIN A1C WITH EAG  
   TSH 3RD GENERATION 4. HTN (hypertension), malignant  I10 401.0 LIPID PANEL  
   METABOLIC PANEL, COMPREHENSIVE Blood pressure significantly elevated in the emergency department We will start Coreg back at lower dose of 25 mg twice daily previously was on 50 mg twice daily We will start back Norvasc at 5 mg daily previously was on 10 mg daily Was on lisinopril at one point may need to start this in the future however had difficulty with potassium and creatinine levels so holding off on this for right now   MICROALBUMIN, UR, RAND W/ MICROALB/CREAT RATIO  
   HEMOGLOBIN A1C WITH EAG  
   TSH 3RD GENERATION 5. Mixed hyperlipidemia  E78.2 272.2 LIPID PANEL  
   METABOLIC PANEL, COMPREHENSIVE MICROALBUMIN, UR, RAND W/ MICROALB/CREAT RATIO  
   HEMOGLOBIN A1C WITH EAG Hyperlipidemia she tolerated her statin well we will go ahead and restart Lipitor   TSH 3RD GENERATION Depression screen reviewed and negative Scribed by Audrey Elizabeth, as dictated by Dr. Gonzalo Muniz. Current diagnosis and concerns discussed with pt at length. Pt understands risks and benefits or current treatment plan and medications, and accepts the treatment and medication with any possible risks. Pt asks appropriate questions, which were answered. Pt was instructed to call with any concerns or problems. I have reviewed the note documented by the scribe. The services provided are my own. The documentation is accurate Estela Michel, who was evaluated through a synchronous (real-time) audio-video encounter, and/or her healthcare decision maker, is aware that it is a billable service, with coverage as determined by her insurance carrier. She provided verbal consent to proceed: Yes, and patient identification was verified. It was conducted pursuant to the emergency declaration under the [address] [place] [address] and the [email or web address] General Act. A caregiver was present when appropriate. Ability to conduct physical exam was limited. I was at home. The patient was at home.

## 2020-09-25 NOTE — PATIENT INSTRUCTIONS
Medicare Wellness Visit, Female The best way to live healthy is to have a lifestyle where you eat a well-balanced diet, exercise regularly, limit alcohol use, and quit all forms of tobacco/nicotine, if applicable. Regular preventive services are another way to keep healthy. Preventive services (vaccines, screening tests, monitoring & exams) can help personalize your care plan, which helps you manage your own care. Screening tests can find health problems at the earliest stages, when they are easiest to treat. [facility] follows the current, evidence-based guidelines published by the [facility] Shaquille Kaplan ([facility]) when recommending preventive services for our patients. Because we follow these guidelines, sometimes recommendations change over time as research supports it. (For example, mammograms used to be recommended annually. Even though Medicare will still pay for an annual mammogram, the newer guidelines recommend a mammogram every two years for women of average risk). Of course, you and your doctor may decide to screen more often for some diseases, based on your risk and your co-morbidities (chronic disease you are already diagnosed with). Preventive services for you include: - Medicare offers their members a free annual wellness visit, which is time for you and your primary care provider to discuss and plan for your preventive service needs. Take advantage of this benefit every year! 
-All adults over the age of 72 should receive the recommended pneumonia vaccines. Current USPSTF guidelines recommend a series of two vaccines for the best pneumonia protection.  
-All adults should have a flu vaccine yearly and a tetanus vaccine every 10 years.  
-All adults age 48 and older should receive the shingles vaccines (series of two vaccines). -All adults age 38-68 who are overweight should have a diabetes screening test once every three years. -All adults born between [birth year] and 1963 should be screened once for Hepatitis C. 
-Other screening tests and preventive services for persons with diabetes include: an eye exam to screen for diabetic retinopathy, a kidney function test, a foot exam, and stricter control over your cholesterol.  
-Cardiovascular screening for adults with routine risk involves an electrocardiogram (ECG) at intervals determined by your doctor.  
-Colorectal cancer screenings should be done for adults age 54-65 with no increased risk factors for colorectal cancer. There are a number of acceptable methods of screening for this type of cancer. Each test has its own benefits and drawbacks. Discuss with your doctor what is most appropriate for you during your annual wellness visit. The different tests include: colonoscopy (considered the best screening method), a fecal occult blood test, a fecal DNA test, and sigmoidoscopy. 
 
-A bone mass density test is recommended when a woman turns 65 to screen for osteoporosis. This test is only recommended one time, as a screening. Some providers will use this same test as a disease monitoring tool if you already have osteoporosis. -Breast cancer screenings are recommended every other year for women of normal risk, age 54-69. 
-Cervical cancer screenings for women over age 72 are only recommended with certain risk factors. Here is a list of your current Health Maintenance items (your personalized list of preventive services) with a due date: 
Health Maintenance Due Topic Date Due  
 DTaP/Tdap/Td  (1 - Tdap) 07/27/1958  Shingles Vaccine (1 of 2) 07/27/1987  Pneumococcal Vaccine (1 of 1 - PPSV23) 03/20/2016  Glaucoma Screening   04/21/2016 [address] [place] [address] Eye Exam  04/21/2016  Hemoglobin A1C    02/03/2017 [address] [place] [address] Diabetic Foot Care  01/18/2018  Albumin Urine Test  01/18/2018  Cholesterol Test   01/18/2018 [address] [place] [address] Annual Well Visit  03/14/2018  Yearly Flu Vaccine (1) 09/01/2020

## 2020-09-25 NOTE — PROGRESS NOTES
This is an Initial Medicare Annual Wellness Exam (AWV) (Performed 12 months after IPPE or effective date of Medicare Part B enrollment, Once in a lifetime) I have reviewed the patient's medical history in detail and updated the computerized patient record. History Patient Active Problem List  
Diagnosis Code  
 HTN (hypertension), malignant I10  
 DM (diabetes mellitus), type 2, uncontrolled ([facility] E11.65  
 Unspecified pleural effusion J90  
 UTI (urinary tract infection) N39.0  Diastolic CHF, acute (HCC) I50.31  
 CHF (congestive heart failure) (HCC) I50.9  Pulmonary nodules R91.8  Chronic systolic heart failure (HCC) I50.22  Type II or unspecified type diabetes mellitus without mention of complication, not stated as uncontrolled E11.9  ACP (advance care planning) Z71.89  
 Iron deficiency anemia D50.9 Past Medical History:  
Diagnosis Date  CHF (congestive heart failure) ([facility] 4/15/2011  CHF (congestive heart failure) ([facility] 4/15/2011  Diabetes ([facility]  Diastolic CHF, acute ([facility] 3/19/2011  Hypertension No past surgical history on file. Current Outpatient Medications Medication Sig Dispense Refill  amLODIPine (NORVASC) 10 mg tablet TAKE 1 TABLET EVERY DAY 90 Tab 1  
 SITagliptin (JANUVIA) 100 mg tablet Take 1 Tab by mouth daily. 30 Tab 3  carvedilol (COREG) 25 mg tablet Take 2 Tabs by mouth two (2) times daily (with meals). 60 Tab 0  
 atorvastatin (LIPITOR) 10 mg tablet TAKE 1 TABLET EVERY NIGHT 90 Tab 3  
 metFORMIN (GLUCOPHAGE) 1,000 mg tablet TAKE 1 TABLET TWICE DAILY 180 Tab 0  
 lisinopril (PRINIVIL, ZESTRIL) 10 mg tablet Take 1 Tab by mouth daily. 30 Tab 4  furosemide (LASIX) 20 mg tablet TAKE 1 TABLET DAILY 90 Tab 3  
 ergocalciferol (ERGOCALCIFEROL) 50,000 unit capsule Take 1 Cap by mouth every seven (7) days.  8 Cap 0  
 lisinopril (PRINIVIL, ZESTRIL) 20 mg tablet TAKE 1 TABLET EVERY DAY 90 Tab 1  
  pneumococcal 13 klaus conj dip (PREVNAR 13, PF,) 0.5 mL syrg injection 0.5 mL by IntraMUSCular route PRIOR TO DISCHARGE for 1 dose. 1 Syringe 0  
 Lancets (ACCU-CHEK FASTCLIX) misc Check glucose twice daily 3 Package 3  
 glucose blood VI test strips (ACCU-CHEK RISSA PLUS TEST STRP) strip Check glucose twice daily 3 Package 3  Blood Glucose Control, Normal (ACCU-CHEK SMARTVIEW CONTRL SOL) soln Daily 1 each 3  
 alcohol swabs (BD SINGLE USE SWABS REGULAR) padm Daily 100 each 3  
 aspirin delayed-release 81 mg tablet Take  by mouth daily. No Known Allergies Family History Problem Relation Age of Onset  Heart Disease Father  Hypertension Sister  Diabetes Sister  Asthma Sister  Diabetes Brother  Hypertension Brother  Hypertension Sister  Heart Disease Sister  Diabetes Brother  Hypertension Brother Social History Tobacco Use  Smoking status: Never Smoker  Smokeless tobacco: Never Used Substance Use Topics  Alcohol use: No  
 
 
Depression Risk Factor Screening:  
 
3 most recent PHQ Screens 9/25/2020 Little interest or pleasure in doing things Not at all Feeling down, depressed, irritable, or hopeless Not at all Total Score PHQ 2 0 Alcohol Risk Screen Do you average more than 1 drink per night or more than 7 drinks a week:  No 
 
On any one occasion in the past three months have you have had more than 3 drinks containing alcohol:  No 
 
 
 
Functional Ability and Level of Safety:  
Hearing: Hearing is good. Activities of Daily Living: The home contains: handrails and grab bars Patient needs help with:  transportation, shopping, preparing meals, laundry, housework, managing medications and managing money Ambulation: with mild difficulty Fall Risk: 
Fall Risk Assessment, last 12 mths 9/25/2020 Able to walk? Yes Fall in past 12 months? No  
 
Abuse Screen: 
Patient is not abused 
 lives w daughter Cognitive Screening Has your family/caregiver stated any concerns about your memory: no 
  
Cognitive Screening: Normal - Mini Cog Test 
 
Patient Care Team  
Patient Care Team: 
William Bentley MD as PCP - General (Internal Medicine) Linsey Barrera MD (Cardiology) [facility]  
updated Assessment/Plan Education and counseling provided: 
Are appropriate based on today's review and evaluation End-of-Life planning (with patient's consent) Pneumococcal Vaccine Influenza Vaccine Screening Mammography Screening Pap and pelvic (covered once every 2 years) Colorectal cancer screening tests Bone mass measurement (DEXA) Screening for glaucoma Diabetes screening test 
 
Diagnoses and all orders for this visit: 
 
1. Medicare annual wellness visit, initial 
 
  
 
Health Maintenance Due Topic Date Due  
 DTaP/Tdap/Td series (1 - Tdap) [birth year]/1958  Shingrix Vaccine Age 50> (1 of 2) 07/27/1987  Pneumococcal 65+ years (1 of 1 - PPSV23) 03/20/2016  GLAUCOMA SCREENING Q2Y  04/21/2016  Eye Exam Retinal or Dilated  04/21/2016  A1C test (Diabetic or Prediabetic)  02/03/2017  Foot Exam Q1  01/18/2018  MICROALBUMIN Q1  01/18/2018  Lipid Screen  01/18/2018  Medicare Yearly Exam  03/14/2018  Flu Vaccine (1) 09/01/2020 SDM is  and daughter Colonoscopy: never had, declines   
Pap: due, declines   
Mammogram: declines Dexa: declines Tdap: 4/05/2011 Pneumovax: 3/20/2011 [id number]: script given Zostavax: declines   
Flu shot: declines   
 
 
A1c:  8/16 5.9 due now Eye exam: Dr. Srinath Gaytan, 1/04/16, ([phone]), due, will schedule Lipids: 1/17, LDL 53 due now Medication reconciliation completed by MA and reviewed by me. Medical/surgical/social/family history reviewed and updated by me. Patient provided AVS and preventative screening table. Patient verbalized understanding of all information discussed. Ron Wood, who was evaluated through a synchronous (real-time) audio-video encounter, and/or her healthcare decision maker, is aware that it is a billable service, with coverage as determined by her insurance carrier. She provided verbal consent to proceed: Yes, and patient identification was verified. It was conducted pursuant to the emergency declaration under the [address] [place] [address] and the [employer] General Act. A caregiver was present when appropriate. Ability to conduct physical exam was limited. I was at home. The patient was at home.  
 
 
Skip Curry MD

## 2020-10-09 NOTE — TELEPHONE ENCOUNTER
Lisa Bernardo, [address] [address] Office Pool               Caller's first and last name: Ugo Vivar   Reason for call: Prescriptions dated 9/25/20 were called into [employer] and should have been submitted to [employer].    Callback required yes/no and why: yes   Best contact number(s): [phone]   Details to clarify the request: n/a     Copy/paste  ENVERA

## 2020-10-13 NOTE — PROGRESS NOTES
HISTORY OF PRESENT ILLNESS Alicia Muro is a 82 y.o. female. HPI Last here 9/25/20. Here for routine care This is an established visit completed with telemedicine was completed with video assist 
the patient acknowledges and agrees to this method of visitation fabrice Presents with daughter who provides hx 
  
No home BP readings - will be getting monitor She used to be on  norvasc 10mg, coreg 50mg BID- has not been on these in several years was also on lisinopril in the past but it had been stopped due to potassium issues and climbing creatinine 
lov restarted norvasc 5 mg and coreg 25 mg BID -- she has not restarted this yet Recall had decreased lisinopril d/t hyperkalemia 
  
She used to take lasix 20mg daily for edema No swelling currently so not needed 
  She is diabetic Will be getting glucometer and strips She used to be on metformin 1000mg BID this was changed to [facility] [place] [place] due to renal impairment- has not been on this in several years She also takes ASA 81mg daily 
  
Reminded pt to get labs done  
  She used to take vit D 1000 units daily 
  
Wt lov was 132 lb 
  
She used to see Dr. Raine Casas (cardio) for CAD and cardiomyopathy but is long overdue for follow-up recall significant HF in the past  
  
Restart her Lipitor 10 mg for cholesterol last office visit but she has not yet restarted this 
  She just stopped taking all of her medication somewhere around 2017 just did not feel it taking them 
    
SDM is  and daughter 
  
PREVENTIVE:   
Colonoscopy: never had, declines   
Pap: due, declines   
Mammogram: declines Dexa: declines Tdap: 4/05/2011 Pneumovax: 3/20/2011 [id number]: script given Zostavax: declines   
Flu shot: declines   
Foot exam: 8/03/16 Microalbumin: 1/17 A1c:  8/12 6.4, 11/12 6.3, 3/13 6.1, 7/13 6.2, 11/13 6.0, 2/14 6.3, 5/14 6.1, 8/14 6.3, 1/15 6.4, 9/16 5.8 , 4/16 5.9, 8/16 5.9 Eye exam: Dr. Steven Cisneros, 1/04/16, ([phone]), due, will schedule Lipids: 1/17, LDL 53 Patient Active Problem List  
 Diagnosis Date Noted  Iron deficiency anemia 08/09/2016  Type II or unspecified type diabetes mellitus without mention of complication, not stated as uncontrolled 05/30/2012  Pulmonary nodules 04/29/2011  CHF (congestive heart failure) ([facility] 04/15/2011  
 HTN (hypertension), malignant 03/19/2011  DM (diabetes mellitus), type 2, uncontrolled ([facility] 03/19/2011  Unspecified pleural effusion 03/19/2011 Current Outpatient Medications Medication Sig Dispense Refill  amLODIPine (NORVASC) 5 mg tablet Take 1 Tab by mouth daily. 90 Tab 1  
 atorvastatin (LIPITOR) 10 mg tablet TAKE 1 TABLET EVERY NIGHT 90 Tab 1  carvediloL (COREG) 25 mg tablet Take 1 Tab by mouth two (2) times daily (with meals). 180 Tab 1  
 lancets 30 gauge misc Check glucose once daily; dx: E11.65 1 Package 1  Blood-Glucose Meter monitoring kit Check glucose once daily; dx: E11.65 1 Kit 0  
 glucose blood VI test strips (blood glucose test) strip Check glucose once daily; dx: E11.65 100 Strip 1  
 SITagliptin (JANUVIA) 100 mg tablet Take 1 Tab by mouth daily. 30 Tab 3  
 lisinopril (PRINIVIL, ZESTRIL) 20 mg tablet TAKE 1 TABLET EVERY DAY 90 Tab 1  
 metFORMIN (GLUCOPHAGE) 1,000 mg tablet TAKE 1 TABLET TWICE DAILY 180 Tab 0  
 lisinopril (PRINIVIL, ZESTRIL) 10 mg tablet Take 1 Tab by mouth daily. 30 Tab 4  furosemide (LASIX) 20 mg tablet TAKE 1 TABLET DAILY 90 Tab 3  pneumococcal 13 klaus conj dip (PREVNAR 13, PF,) 0.5 mL syrg injection 0.5 mL by IntraMUSCular route PRIOR TO DISCHARGE for 1 dose. 1 Syringe 0  
 ergocalciferol (ERGOCALCIFEROL) 50,000 unit capsule Take 1 Cap by mouth every seven (7) days.  8 Cap 0  
 Lancets (ACCU-CHEK FASTCLIX) misc Check glucose twice daily 3 Package 3  
 glucose blood VI test strips (ACCU-CHEK RISSA PLUS TEST STRP) strip Check glucose twice daily 3 Package 3  
  Blood Glucose Control, Normal (ACCU-CHEK SMARTVIEW CONTRL SOL) soln Daily 1 each 3  
 alcohol swabs (BD SINGLE USE SWABS REGULAR) padm Daily 100 each 3  
 aspirin delayed-release 81 mg tablet Take  by mouth daily. No past surgical history on file. Lab Results Component Value Date/Time WBC 7.8 08/18/2020 10:28 AM  
 HGB 11.6 08/18/2020 10:28 AM  
 HCT 36.6 08/18/2020 10:28 AM  
 PLATELET 344 12/71/2270 10:28 AM  
 MCV 94.8 08/18/2020 10:28 AM  
 
Lab Results Component Value Date/Time Cholesterol, total 140 01/18/2017 10:36 AM  
 HDL Cholesterol 76 01/18/2017 10:36 AM  
 LDL, calculated 53 01/18/2017 10:36 AM  
 Triglyceride 53 01/18/2017 10:36 AM  
 CHOL/HDL Ratio 3.0 03/20/2011 04:20 AM  
 
Lab Results Component Value Date/Time GFR est non-AA 51 (L) 08/18/2020 01:08 PM  
 GFRNA, POC 52 (L) 04/18/2012 09:18 AM  
 GFR est AA >60 08/18/2020 01:08 PM  
 GFRAA, POC >60 04/18/2012 09:18 AM  
 Creatinine 1.03 (H) 08/18/2020 01:08 PM  
 Creatinine (POC) 1.1 04/18/2012 09:18 AM  
 BUN 11 08/18/2020 01:08 PM  
 Sodium 141 08/18/2020 01:08 PM  
 Potassium 3.5 08/18/2020 01:08 PM  
 Chloride 109 (H) 08/18/2020 01:08 PM  
 CO2 26 08/18/2020 01:08 PM  
  
 
Review of Systems Respiratory: Negative for shortness of breath. Cardiovascular: Negative for chest pain. Physical Exam 
Constitutional:   
   General: She is not in acute distress. Appearance: Normal appearance. She is not ill-appearing, toxic-appearing or diaphoretic. HENT:  
   Head: Normocephalic and atraumatic. Eyes:  
   General:     
   Right eye: No discharge. Left eye: No discharge. Conjunctiva/sclera: Conjunctivae normal.  
Pulmonary:  
   Effort: No respiratory distress. Neurological:  
   Mental Status: She is alert and oriented to person, place, and time. Mental status is at baseline.   
   Gait: Gait normal.  
Psychiatric:     
   Mood and Affect: Mood normal.     
   Behavior: Behavior normal.  
 
 
 
 ASSESSMENT and PLAN 
  ICD-10-CM ICD-9-CM 1. Chronic systolic heart failure ([facility] Was medically managed in the past she has not been to cardiology in several years She denies any symptoms of CAD or CHF at this time No shortness of breath no swelling in her legs She previously was on Lasix but longer taking this We will hold off this medication for now until blood work is back and until he follow-up in 3 weeks with blood pressure readings I50.22 428.22   
2. Uncontrolled type 2 diabetes mellitus with hyperglycemia ([facility] No longer on medication previously was on several medications for her diabetes She is not checking her blood sugars at [facility] home monitoring Await A1c Discussed need for eye exam 
 
She expressed understanding will get this completed E11.65 250.02   
3. Essential hypertension Unfortunately we have not made much progress since last office visit Medications were ordered but patient decided she did not want to come up at her local pharmacy and instead wanted them for mail order She has not yet received her medications in the mail She states they will be coming in the next couple of days She will be started on Norvasc 5 mg and Coreg 25 mg twice daily She is not monitoring her blood pressure either Discussed getting a new blood pressure cuff We will review readings in 3 weeks I10 401.9 4. Iron deficiency anemia, unspecified iron deficiency anemia type --repeat CBC and ensure stable D50.9 280.9 5. Mixed hyperlipidemia Previously on Lipitor this was restarted but she has not yet started it We will have her restart her Lipitor as soon as it comes in the mail and check lipids and adjust medication further as needed E78.2 272.2 Scribed by Fabricio Matos, as dictated by Dr. Bob Mora. Current diagnosis and concerns discussed with pt at length.  Pt understands risks and benefits or current treatment plan and medications, and accepts the treatment and medication with any possible risks. Pt asks appropriate questions, which were answered. Pt was instructed to call with any concerns or problems. I have reviewed the note documented by the scribe. The services provided are my own. The documentation is accurate Yamil Guaman, who was evaluated through a synchronous (real-time) audio-video encounter, and/or her healthcare decision maker, is aware that it is a billable service, with coverage as determined by her insurance carrier. She provided verbal consent to proceed: Yes, and patient identification was verified. It was conducted pursuant to the emergency declaration under the [facility], [address] [place] [address] authority and the [employer] and [employer] General Act. A caregiver was present when appropriate. Ability to conduct physical exam was limited. I was at home. The patient was at home.

## 2020-10-28 NOTE — TELEPHONE ENCOUNTER
----- Message from Alison Melgar 28 sent at 10/28/2020 11:29 AM EDT -----  Regarding: DR Burnett/refill  Contact: [phone]  Medication Refill    Caller (if not patient):Armida      Relationship of caller (if not patient):daughter      Best contact number(s):[phone]      Name of medication and dosage if known:Lancets       Is patient out of this medication (yes/no):yes      Pharmacy name:[facility] mail order    Pharmacy listed in chart? (yes/no):yes  Pharmacy phone number:      Details to clarify the request:pt received a letter from [address] [place] [address] that this was not refilled. [address] [place] [address] is asking for clarification. Please call [phone].  For electronic call [phone]      Message from Alfie

## 2020-10-29 NOTE — TELEPHONE ENCOUNTER
Called and spoke to patient's . He wants us to go ahead and and fill meter, strips and lancets. Called [facility] and spoke to CS.

## 2020-10-29 NOTE — TELEPHONE ENCOUNTER
Patient would need the Accu Chek Bessie which is covered under her insurance. Can you please approve and send to [facility] .

## 2020-11-04 NOTE — PROGRESS NOTES
HISTORY OF PRESENT ILLNESS Tasneem Nesbitt is a 82 y.o. female. HPI Last here 10/16/20. Here for routine care This is an established visit completed with telemedicine was completed with video assist 
the patient acknowledges and agrees to this method of visitation fabrice Presents with daughter who provides hx 
  
Daughter c/o she sleeps all day Discussed she needs to complete labs before I can make a decision about this  
 
Daughter called ambulance because she passed out unclear circumstances may have been asleep   with ambulance Recall she used to be on  norvasc 10mg, coreg 50mg BID- has not been on these in several years was also on lisinopril in the past but it had been stopped due to potassium issues and climbing creatinine 
lov restarted norvasc 5 mg and coreg 25 mg BID she is not taking these medications last visit she had not been on them yet Recall had decreased lisinopril d/t hyperkalemia 
  
She used to take lasix 20mg daily for edema No swelling currently so not needed 
  She is diabetic 
, 88, 93 , 90, 140 most of them were in the 80s and 90s range so she has no longer on any medications for diabetes She used to be on metformin 1000mg BID this was changed to januvia due to renal impairment- has not been on this in several years  
Not on med currently, will wait until after bloodwork to add meds She also takes ASA 81mg daily 
  
Reminded pt to get labs done  
  She used to take vit D 1000 units daily 
  
Wt is 110 lbs per pt - down 22 lbs x lov  
  
She used to see Dr. Jessica Upton (cardio) for CAD and cardiomyopathy but is long overdue for follow-up recall significant HF in the past  
  
She restarted Lipitor 10 mg for cholesterol 
  She just stopped taking all of her medication somewhere around 2017 just did not feel it taking them 
    
SDM is  and daughter 
  
PREVENTIVE:   
Colonoscopy: never had, declines   
Pap: due, declines   
Mammogram: declines Dexa: declines Tdap: 4/05/2011 Pneumovax: 3/20/2011 [id number]: script given Zostavax: declines   
Flu shot: declines   
Foot exam: 8/03/16 Microalbumin: 1/17 A1c:  8/12 6.4, 11/12 6.3, 3/13 6.1, 7/13 6.2, 11/13 6.0, 2/14 6.3, 5/14 6.1, 8/14 6.3, 1/15 6.4, 9/16 5.8 , 4/16 5.9, 8/16 5.9 Eye exam: Dr. Alfreda Miller, 1/04/16, ([phone]), due, will schedule Lipids: 1/17, LDL 53 Patient Active Problem List  
 Diagnosis Date Noted  Iron deficiency anemia 08/09/2016  Type II or unspecified type diabetes mellitus without mention of complication, not stated as uncontrolled 05/30/2012  Pulmonary nodules 04/29/2011  [facility] 04/15/2011  
 HTN (hypertension), malignant 03/19/2011  DM (diabetes mellitus), type 2, uncontrolled ([facility] 03/19/2011  Unspecified pleural effusion 03/19/2011 Current Outpatient Medications Medication Sig Dispense Refill  amLODIPine (NORVASC) 5 mg tablet Take 1 Tab by mouth daily. 90 Tab 1  
 atorvastatin (LIPITOR) 10 mg tablet TAKE 1 TABLET EVERY NIGHT 90 Tab 1  carvediloL (COREG) 25 mg tablet Take 1 Tab by mouth two (2) times daily (with meals). 180 Tab 1  
 SITagliptin (JANUVIA) 100 mg tablet Take 1 Tab by mouth daily. 30 Tab 3  
 metFORMIN (GLUCOPHAGE) 1,000 mg tablet TAKE 1 TABLET TWICE DAILY 180 Tab 0  
 furosemide (LASIX) 20 mg tablet TAKE 1 TABLET DAILY 90 Tab 3  
 ergocalciferol (ERGOCALCIFEROL) 50,000 unit capsule Take 1 Cap by mouth every seven (7) days. 8 Cap 0  
 aspirin delayed-release 81 mg tablet Take  by mouth daily.  Blood-Glucose Meter (Accu-Chek Bessie Plus Meter) misc E11.65 1 Each 0  
 glucose blood VI test strips (Accu-Chek Bessie Plus test strp) strip E11.65 100 Strip 3  
 lancets (Accu-Chek Softclix Lancets) mis E11.65 100 Each 11  
 lancets 30 gauge misc Check glucose once daily; dx: E11.65 1 Package 1  Blood-Glucose Meter monitoring kit Check glucose once daily; dx: E11.65 1 Kit 0  
  glucose blood VI test strips (blood glucose test) strip Check glucose once daily; dx: E11.65 100 Strip 1  pneumococcal 13 klaus conj dip (PREVNAR 13, PF,) 0.5 mL syrg injection 0.5 mL by IntraMUSCular route PRIOR TO DISCHARGE for 1 dose. 1 Syringe 0  
 Lancets (ACCU-CHEK FASTCLIX) misc Check glucose twice daily 3 Package 3  
 glucose blood VI test strips (ACCU-CHEK RISSA PLUS TEST STRP) strip Check glucose twice daily 3 Package 3  Blood Glucose Control, Normal (ACCU-CHEK SMARTVIEW CONTRL SOL) soln Daily 1 each 3  
 alcohol swabs (BD SINGLE USE SWABS REGULAR) padm Daily 100 each 3 No past surgical history on file. Lab Results Component Value Date/Time WBC 7.8 08/18/2020 10:28 AM  
 HGB 11.6 08/18/2020 10:28 AM  
 HCT 36.6 08/18/2020 10:28 AM  
 PLATELET 769 53/80/8697 10:28 AM  
 MCV 94.8 08/18/2020 10:28 AM  
 
Lab Results Component Value Date/Time Cholesterol, total 140 01/18/2017 10:36 AM  
 HDL Cholesterol 76 01/18/2017 10:36 AM  
 LDL, calculated 53 01/18/2017 10:36 AM  
 Triglyceride 53 01/18/2017 10:36 AM  
 CHOL/HDL Ratio 3.0 03/20/2011 04:20 AM  
 
Lab Results Component Value Date/Time GFR est non-AA 51 (L) 08/18/2020 01:08 PM  
 GFRNA, POC 52 (L) 04/18/2012 09:18 AM  
 GFR est AA >60 08/18/2020 01:08 PM  
 GFRAA, POC >60 04/18/2012 09:18 AM  
 Creatinine 1.03 (H) 08/18/2020 01:08 PM  
 Creatinine (POC) 1.1 04/18/2012 09:18 AM  
 BUN 11 08/18/2020 01:08 PM  
 Sodium 141 08/18/2020 01:08 PM  
 Potassium 3.5 08/18/2020 01:08 PM  
 Chloride 109 (H) 08/18/2020 01:08 PM  
 CO2 26 08/18/2020 01:08 PM  
  
 
Review of Systems Respiratory: Negative for shortness of breath. Cardiovascular: Negative for chest pain. Physical Exam 
Constitutional:   
   General: She is not in acute distress. Appearance: Normal appearance. She is not ill-appearing, toxic-appearing or diaphoretic. HENT:  
   Head: Normocephalic and atraumatic. Eyes:  
   General: Right eye: No discharge. Left eye: No discharge. Conjunctiva/sclera: Conjunctivae normal.  
Pulmonary:  
   Effort: No respiratory distress. Neurological:  
   Mental Status: She is alert and oriented to person, place, and time. Mental status is at baseline. Gait: Gait normal.  
Psychiatric:     
   Mood and Affect: Mood normal.     
   Behavior: Behavior normal.  
 
 
 
ASSESSMENT and PLAN 
  ICD-10-CM ICD-9-CM 1. HTN (hypertension), malignant Unclear control though she reports that EMS stated that her blood pressure was good not too low not too high Currently on Norvasc and Coreg Continue these 2 medications Encouraged home blood pressure monitoring I10 401.0 2. Uncontrolled type 2 diabetes mellitus with hyperglycemia ([facility] Home readings are good Had a few elevated blood sugar readings but the majority are in the 90 range no need to start medication at this time E11.65 250.02   
3. Iron deficiency anemia, unspecified iron deficiency anemia type Check CBC evaluate and treat as needed D50.9 280.9 4. Chronic combined systolic and diastolic congestive heart failure ([facility] Medically managed no longer sees cardiology was lost to follow-up quite some time ago no longer on Lasix not having any swelling she is back on Norvasc and Coreg Await lab reports I50.42 428.42   
  428.0 Scribed by Josiah Thomas, as dictated by Dr. Pamela Coronado. Current diagnosis and concerns discussed with pt at length. Pt understands risks and benefits or current treatment plan and medications, and accepts the treatment and medication with any possible risks. Pt asks appropriate questions, which were answered. Pt was instructed to call with any concerns or problems. I have reviewed the note documented by the scribe. The services provided are my own. The documentation is accurate Zoe Barber, who was evaluated through a synchronous (real-time) audio-video encounter, and/or her healthcare decision maker, is aware that it is a billable service, with coverage as determined by her insurance carrier. She provided verbal consent to proceed: Yes, and patient identification was verified. It was conducted pursuant to the emergency declaration under the [address] [place] [address] and the [employer] and [employer] Act. A caregiver was present when appropriate. Ability to conduct physical exam was limited. I was at home. The patient was at home.

## 2021-01-01 ENCOUNTER — APPOINTMENT (OUTPATIENT)
Dept: GENERAL RADIOLOGY | Age: 86
DRG: 981 | End: 2021-01-01
Attending: ORTHOPAEDIC SURGERY
Payer: MEDICARE

## 2021-01-01 ENCOUNTER — PATIENT OUTREACH (OUTPATIENT)
Dept: CASE MANAGEMENT | Age: 86
End: 2021-01-01

## 2021-01-01 ENCOUNTER — HOME CARE VISIT (OUTPATIENT)
Dept: HOSPICE | Facility: HOSPICE | Age: 86
End: 2021-01-01
Payer: MEDICARE

## 2021-01-01 ENCOUNTER — HOME CARE VISIT (OUTPATIENT)
Dept: SCHEDULING | Facility: HOME HEALTH | Age: 86
End: 2021-01-01
Payer: MEDICARE

## 2021-01-01 ENCOUNTER — HOSPITAL ENCOUNTER (INPATIENT)
Age: 86
LOS: 8 days | Discharge: NURSING, REHAB OR LONG-TERM CARE | DRG: 981 | End: 2021-02-24
Attending: EMERGENCY MEDICINE | Admitting: HOSPITALIST
Payer: MEDICARE

## 2021-01-01 ENCOUNTER — HOSPICE ADMISSION (OUTPATIENT)
Dept: HOSPICE | Facility: HOSPICE | Age: 86
End: 2021-01-01
Payer: MEDICARE

## 2021-01-01 ENCOUNTER — APPOINTMENT (OUTPATIENT)
Dept: GENERAL RADIOLOGY | Age: 86
DRG: 571 | End: 2021-01-01
Attending: EMERGENCY MEDICINE
Payer: MEDICARE

## 2021-01-01 ENCOUNTER — ANESTHESIA EVENT (OUTPATIENT)
Dept: SURGERY | Age: 86
DRG: 981 | End: 2021-01-01
Payer: MEDICARE

## 2021-01-01 ENCOUNTER — ANESTHESIA (OUTPATIENT)
Dept: SURGERY | Age: 86
DRG: 981 | End: 2021-01-01
Payer: MEDICARE

## 2021-01-01 ENCOUNTER — TELEPHONE (OUTPATIENT)
Dept: INTERNAL MEDICINE CLINIC | Age: 86
End: 2021-01-01

## 2021-01-01 ENCOUNTER — APPOINTMENT (OUTPATIENT)
Dept: GENERAL RADIOLOGY | Age: 86
DRG: 981 | End: 2021-01-01
Attending: EMERGENCY MEDICINE
Payer: MEDICARE

## 2021-01-01 ENCOUNTER — APPOINTMENT (OUTPATIENT)
Dept: NON INVASIVE DIAGNOSTICS | Age: 86
DRG: 981 | End: 2021-01-01
Attending: STUDENT IN AN ORGANIZED HEALTH CARE EDUCATION/TRAINING PROGRAM
Payer: MEDICARE

## 2021-01-01 ENCOUNTER — APPOINTMENT (OUTPATIENT)
Dept: CT IMAGING | Age: 86
DRG: 571 | End: 2021-01-01
Attending: EMERGENCY MEDICINE
Payer: MEDICARE

## 2021-01-01 ENCOUNTER — APPOINTMENT (OUTPATIENT)
Dept: GENERAL RADIOLOGY | Age: 86
DRG: 981 | End: 2021-01-01
Attending: STUDENT IN AN ORGANIZED HEALTH CARE EDUCATION/TRAINING PROGRAM
Payer: MEDICARE

## 2021-01-01 ENCOUNTER — APPOINTMENT (OUTPATIENT)
Dept: CT IMAGING | Age: 86
DRG: 981 | End: 2021-01-01
Attending: EMERGENCY MEDICINE
Payer: MEDICARE

## 2021-01-01 ENCOUNTER — HOSPITAL ENCOUNTER (INPATIENT)
Age: 86
LOS: 2 days | Discharge: HOSPICE | DRG: 571 | End: 2021-03-31
Attending: EMERGENCY MEDICINE | Admitting: GENERAL ACUTE CARE HOSPITAL
Payer: MEDICARE

## 2021-01-01 ENCOUNTER — VIRTUAL VISIT (OUTPATIENT)
Dept: INTERNAL MEDICINE CLINIC | Age: 86
End: 2021-01-01
Payer: MEDICARE

## 2021-01-01 ENCOUNTER — OFFICE VISIT (OUTPATIENT)
Dept: CARDIOLOGY CLINIC | Age: 86
End: 2021-01-01
Payer: MEDICARE

## 2021-01-01 VITALS
WEIGHT: 110.67 LBS | HEIGHT: 66 IN | RESPIRATION RATE: 16 BRPM | SYSTOLIC BLOOD PRESSURE: 90 MMHG | HEART RATE: 72 BPM | OXYGEN SATURATION: 95 % | BODY MASS INDEX: 17.79 KG/M2 | DIASTOLIC BLOOD PRESSURE: 50 MMHG

## 2021-01-01 VITALS
RESPIRATION RATE: 10 BRPM | HEART RATE: 42 BPM | OXYGEN SATURATION: 86 % | SYSTOLIC BLOOD PRESSURE: 86 MMHG | DIASTOLIC BLOOD PRESSURE: 48 MMHG | TEMPERATURE: 98.2 F

## 2021-01-01 VITALS
WEIGHT: 131.6 LBS | DIASTOLIC BLOOD PRESSURE: 72 MMHG | HEIGHT: 66 IN | TEMPERATURE: 98.7 F | RESPIRATION RATE: 20 BRPM | OXYGEN SATURATION: 100 % | SYSTOLIC BLOOD PRESSURE: 137 MMHG | BODY MASS INDEX: 21.15 KG/M2 | HEART RATE: 105 BPM

## 2021-01-01 VITALS — TEMPERATURE: 98 F | HEART RATE: 82 BPM | OXYGEN SATURATION: 90 % | RESPIRATION RATE: 16 BRPM

## 2021-01-01 VITALS
BODY MASS INDEX: 21.24 KG/M2 | DIASTOLIC BLOOD PRESSURE: 60 MMHG | RESPIRATION RATE: 16 BRPM | HEART RATE: 100 BPM | OXYGEN SATURATION: 97 % | HEIGHT: 66 IN | SYSTOLIC BLOOD PRESSURE: 120 MMHG

## 2021-01-01 VITALS
RESPIRATION RATE: 14 BRPM | SYSTOLIC BLOOD PRESSURE: 92 MMHG | DIASTOLIC BLOOD PRESSURE: 56 MMHG | TEMPERATURE: 98 F | OXYGEN SATURATION: 98 % | HEART RATE: 62 BPM

## 2021-01-01 VITALS
TEMPERATURE: 96.3 F | SYSTOLIC BLOOD PRESSURE: 119 MMHG | HEART RATE: 76 BPM | HEIGHT: 66 IN | OXYGEN SATURATION: 100 % | RESPIRATION RATE: 18 BRPM | DIASTOLIC BLOOD PRESSURE: 71 MMHG | WEIGHT: 110.67 LBS | BODY MASS INDEX: 17.79 KG/M2

## 2021-01-01 VITALS
OXYGEN SATURATION: 98 % | SYSTOLIC BLOOD PRESSURE: 159 MMHG | HEART RATE: 82 BPM | RESPIRATION RATE: 20 BRPM | TEMPERATURE: 98.3 F | DIASTOLIC BLOOD PRESSURE: 59 MMHG

## 2021-01-01 VITALS — HEART RATE: 46 BPM | SYSTOLIC BLOOD PRESSURE: 70 MMHG | RESPIRATION RATE: 12 BRPM | DIASTOLIC BLOOD PRESSURE: 38 MMHG

## 2021-01-01 VITALS
DIASTOLIC BLOOD PRESSURE: 60 MMHG | SYSTOLIC BLOOD PRESSURE: 127 MMHG | OXYGEN SATURATION: 95 % | HEART RATE: 72 BPM | RESPIRATION RATE: 16 BRPM

## 2021-01-01 DIAGNOSIS — R62.7 FAILURE TO THRIVE IN ADULT: ICD-10-CM

## 2021-01-01 DIAGNOSIS — S72.009A CLOSED FRACTURE OF HIP, UNSPECIFIED LATERALITY, INITIAL ENCOUNTER (HCC): ICD-10-CM

## 2021-01-01 DIAGNOSIS — I47.29 NSVT (NONSUSTAINED VENTRICULAR TACHYCARDIA): ICD-10-CM

## 2021-01-01 DIAGNOSIS — Z01.818 PRE-OP EVALUATION: ICD-10-CM

## 2021-01-01 DIAGNOSIS — T14.8XXA WOUND INFECTION: ICD-10-CM

## 2021-01-01 DIAGNOSIS — M46.28 OSTEOMYELITIS OF SACRUM (HCC): Primary | ICD-10-CM

## 2021-01-01 DIAGNOSIS — R40.4 TRANSIENT ALTERATION OF AWARENESS: ICD-10-CM

## 2021-01-01 DIAGNOSIS — N17.9 ACUTE KIDNEY INJURY (HCC): ICD-10-CM

## 2021-01-01 DIAGNOSIS — L08.9 WOUND INFECTION: ICD-10-CM

## 2021-01-01 DIAGNOSIS — E78.2 MIXED HYPERLIPIDEMIA: ICD-10-CM

## 2021-01-01 DIAGNOSIS — I50.42 CHRONIC COMBINED SYSTOLIC AND DIASTOLIC CONGESTIVE HEART FAILURE (HCC): ICD-10-CM

## 2021-01-01 DIAGNOSIS — E86.0 DEHYDRATION: Primary | ICD-10-CM

## 2021-01-01 DIAGNOSIS — I10 HTN (HYPERTENSION), MALIGNANT: ICD-10-CM

## 2021-01-01 DIAGNOSIS — N30.01 ACUTE CYSTITIS WITH HEMATURIA: ICD-10-CM

## 2021-01-01 DIAGNOSIS — E11.65 UNCONTROLLED TYPE 2 DIABETES MELLITUS WITH HYPERGLYCEMIA (HCC): Primary | ICD-10-CM

## 2021-01-01 DIAGNOSIS — N30.00 ACUTE CYSTITIS WITHOUT HEMATURIA: ICD-10-CM

## 2021-01-01 DIAGNOSIS — I42.8 NICM (NONISCHEMIC CARDIOMYOPATHY) (HCC): ICD-10-CM

## 2021-01-01 DIAGNOSIS — I50.42 CHRONIC COMBINED SYSTOLIC AND DIASTOLIC CONGESTIVE HEART FAILURE (HCC): Primary | ICD-10-CM

## 2021-01-01 DIAGNOSIS — I25.10 CORONARY ARTERY DISEASE DUE TO LIPID RICH PLAQUE: ICD-10-CM

## 2021-01-01 DIAGNOSIS — I25.83 CORONARY ARTERY DISEASE DUE TO LIPID RICH PLAQUE: ICD-10-CM

## 2021-01-01 DIAGNOSIS — I10 ESSENTIAL HYPERTENSION: ICD-10-CM

## 2021-01-01 LAB
ALBUMIN SERPL-MCNC: 1.4 G/DL (ref 3.5–5)
ALBUMIN SERPL-MCNC: 1.7 G/DL (ref 3.5–5)
ALBUMIN SERPL-MCNC: 1.7 G/DL (ref 3.5–5)
ALBUMIN SERPL-MCNC: 1.9 G/DL (ref 3.5–5)
ALBUMIN SERPL-MCNC: 2 G/DL (ref 3.5–5)
ALBUMIN SERPL-MCNC: 2 G/DL (ref 3.5–5)
ALBUMIN SERPL-MCNC: 2.1 G/DL (ref 3.5–5)
ALBUMIN/GLOB SERPL: 0.3 {RATIO} (ref 1.1–2.2)
ALBUMIN/GLOB SERPL: 0.3 {RATIO} (ref 1.1–2.2)
ALBUMIN/GLOB SERPL: 0.4 {RATIO} (ref 1.1–2.2)
ALP SERPL-CCNC: 101 U/L (ref 45–117)
ALP SERPL-CCNC: 108 U/L (ref 45–117)
ALP SERPL-CCNC: 69 U/L (ref 45–117)
ALP SERPL-CCNC: 80 U/L (ref 45–117)
ALP SERPL-CCNC: 87 U/L (ref 45–117)
ALP SERPL-CCNC: 97 U/L (ref 45–117)
ALT SERPL-CCNC: 10 U/L (ref 12–78)
ALT SERPL-CCNC: 13 U/L (ref 12–78)
ALT SERPL-CCNC: 14 U/L (ref 12–78)
ALT SERPL-CCNC: 7 U/L (ref 12–78)
ALT SERPL-CCNC: 9 U/L (ref 12–78)
ALT SERPL-CCNC: 9 U/L (ref 12–78)
AMORPH CRY URNS QL MICRO: ABNORMAL
ANION GAP SERPL CALC-SCNC: 10 MMOL/L (ref 5–15)
ANION GAP SERPL CALC-SCNC: 10 MMOL/L (ref 5–15)
ANION GAP SERPL CALC-SCNC: 12 MMOL/L (ref 5–15)
ANION GAP SERPL CALC-SCNC: 5 MMOL/L (ref 5–15)
ANION GAP SERPL CALC-SCNC: 6 MMOL/L (ref 5–15)
ANION GAP SERPL CALC-SCNC: 7 MMOL/L (ref 5–15)
ANION GAP SERPL CALC-SCNC: 8 MMOL/L (ref 5–15)
ANION GAP SERPL CALC-SCNC: 9 MMOL/L (ref 5–15)
ANION GAP SERPL CALC-SCNC: 9 MMOL/L (ref 5–15)
APPEARANCE UR: ABNORMAL
APPEARANCE UR: ABNORMAL
AST SERPL-CCNC: 17 U/L (ref 15–37)
AST SERPL-CCNC: 18 U/L (ref 15–37)
AST SERPL-CCNC: 18 U/L (ref 15–37)
AST SERPL-CCNC: 19 U/L (ref 15–37)
AST SERPL-CCNC: 19 U/L (ref 15–37)
AST SERPL-CCNC: 22 U/L (ref 15–37)
ATRIAL RATE: 88 BPM
BACTERIA SPEC CULT: ABNORMAL
BACTERIA SPEC CULT: NORMAL
BACTERIA URNS QL MICRO: ABNORMAL /HPF
BACTERIA URNS QL MICRO: NEGATIVE /HPF
BASOPHILS # BLD: 0 K/UL (ref 0–0.1)
BASOPHILS # BLD: 0.2 K/UL (ref 0–0.1)
BASOPHILS NFR BLD: 0 % (ref 0–1)
BASOPHILS NFR BLD: 1 % (ref 0–1)
BILIRUB SERPL-MCNC: 0.3 MG/DL (ref 0.2–1)
BILIRUB SERPL-MCNC: 0.4 MG/DL (ref 0.2–1)
BILIRUB SERPL-MCNC: 0.6 MG/DL (ref 0.2–1)
BILIRUB SERPL-MCNC: 0.6 MG/DL (ref 0.2–1)
BILIRUB UR QL CFM: NEGATIVE
BILIRUB UR QL: NEGATIVE
BUN SERPL-MCNC: 17 MG/DL (ref 6–20)
BUN SERPL-MCNC: 21 MG/DL (ref 6–20)
BUN SERPL-MCNC: 23 MG/DL (ref 6–20)
BUN SERPL-MCNC: 24 MG/DL (ref 6–20)
BUN SERPL-MCNC: 29 MG/DL (ref 6–20)
BUN SERPL-MCNC: 31 MG/DL (ref 6–20)
BUN SERPL-MCNC: 34 MG/DL (ref 6–20)
BUN SERPL-MCNC: 37 MG/DL (ref 6–20)
BUN SERPL-MCNC: 58 MG/DL (ref 6–20)
BUN SERPL-MCNC: 59 MG/DL (ref 6–20)
BUN SERPL-MCNC: 64 MG/DL (ref 6–20)
BUN SERPL-MCNC: 70 MG/DL (ref 6–20)
BUN/CREAT SERPL: 23 (ref 12–20)
BUN/CREAT SERPL: 26 (ref 12–20)
BUN/CREAT SERPL: 29 (ref 12–20)
BUN/CREAT SERPL: 31 (ref 12–20)
BUN/CREAT SERPL: 32 (ref 12–20)
BUN/CREAT SERPL: 33 (ref 12–20)
BUN/CREAT SERPL: 40 (ref 12–20)
BUN/CREAT SERPL: 42 (ref 12–20)
BUN/CREAT SERPL: 44 (ref 12–20)
BUN/CREAT SERPL: 45 (ref 12–20)
BUN/CREAT SERPL: 48 (ref 12–20)
BUN/CREAT SERPL: 52 (ref 12–20)
C TRACH DNA SPEC QL NAA+PROBE: NEGATIVE
CALCIUM SERPL-MCNC: 7 MG/DL (ref 8.5–10.1)
CALCIUM SERPL-MCNC: 7.2 MG/DL (ref 8.5–10.1)
CALCIUM SERPL-MCNC: 7.3 MG/DL (ref 8.5–10.1)
CALCIUM SERPL-MCNC: 7.4 MG/DL (ref 8.5–10.1)
CALCIUM SERPL-MCNC: 7.7 MG/DL (ref 8.5–10.1)
CALCIUM SERPL-MCNC: 7.9 MG/DL (ref 8.5–10.1)
CALCIUM SERPL-MCNC: 7.9 MG/DL (ref 8.5–10.1)
CALCIUM SERPL-MCNC: 8.1 MG/DL (ref 8.5–10.1)
CALCIUM SERPL-MCNC: 8.2 MG/DL (ref 8.5–10.1)
CALCIUM SERPL-MCNC: 8.4 MG/DL (ref 8.5–10.1)
CALCIUM SERPL-MCNC: 8.6 MG/DL (ref 8.5–10.1)
CALCIUM SERPL-MCNC: 8.7 MG/DL (ref 8.5–10.1)
CALCULATED P AXIS, ECG09: 82 DEGREES
CALCULATED R AXIS, ECG10: -43 DEGREES
CALCULATED T AXIS, ECG11: 132 DEGREES
CC UR VC: ABNORMAL
CHLORIDE SERPL-SCNC: 110 MMOL/L (ref 97–108)
CHLORIDE SERPL-SCNC: 113 MMOL/L (ref 97–108)
CHLORIDE SERPL-SCNC: 115 MMOL/L (ref 97–108)
CHLORIDE SERPL-SCNC: 117 MMOL/L (ref 97–108)
CHLORIDE SERPL-SCNC: 117 MMOL/L (ref 97–108)
CHLORIDE SERPL-SCNC: 118 MMOL/L (ref 97–108)
CHLORIDE SERPL-SCNC: 119 MMOL/L (ref 97–108)
CHLORIDE SERPL-SCNC: 122 MMOL/L (ref 97–108)
CHLORIDE SERPL-SCNC: 124 MMOL/L (ref 97–108)
CHLORIDE SERPL-SCNC: 130 MMOL/L (ref 97–108)
CK SERPL-CCNC: 150 U/L (ref 26–192)
CK SERPL-CCNC: 64 U/L (ref 26–192)
CLUE CELLS VAG QL WET PREP: NORMAL
CO2 SERPL-SCNC: 18 MMOL/L (ref 21–32)
CO2 SERPL-SCNC: 19 MMOL/L (ref 21–32)
CO2 SERPL-SCNC: 20 MMOL/L (ref 21–32)
CO2 SERPL-SCNC: 21 MMOL/L (ref 21–32)
CO2 SERPL-SCNC: 21 MMOL/L (ref 21–32)
CO2 SERPL-SCNC: 23 MMOL/L (ref 21–32)
CO2 SERPL-SCNC: 24 MMOL/L (ref 21–32)
CO2 SERPL-SCNC: 25 MMOL/L (ref 21–32)
CO2 SERPL-SCNC: 26 MMOL/L (ref 21–32)
COLOR UR: ABNORMAL
COLOR UR: ABNORMAL
COMMENT, HOLDF: NORMAL
COMMENT, HOLDF: NORMAL
COVID-19 RAPID TEST, COVR: NOT DETECTED
CREAT SERPL-MCNC: 0.58 MG/DL (ref 0.55–1.02)
CREAT SERPL-MCNC: 0.66 MG/DL (ref 0.55–1.02)
CREAT SERPL-MCNC: 0.72 MG/DL (ref 0.55–1.02)
CREAT SERPL-MCNC: 0.88 MG/DL (ref 0.55–1.02)
CREAT SERPL-MCNC: 0.9 MG/DL (ref 0.55–1.02)
CREAT SERPL-MCNC: 0.93 MG/DL (ref 0.55–1.02)
CREAT SERPL-MCNC: 0.99 MG/DL (ref 0.55–1.02)
CREAT SERPL-MCNC: 1.05 MG/DL (ref 0.55–1.02)
CREAT SERPL-MCNC: 1.12 MG/DL (ref 0.55–1.02)
CREAT SERPL-MCNC: 1.23 MG/DL (ref 0.55–1.02)
CREAT SERPL-MCNC: 1.41 MG/DL (ref 0.55–1.02)
CREAT SERPL-MCNC: 1.65 MG/DL (ref 0.55–1.02)
DIAGNOSIS, 93000: NORMAL
DIFFERENTIAL METHOD BLD: ABNORMAL
ECHO AO ROOT DIAM: 2.74 CM
ECHO AV AREA PEAK VELOCITY: 2.31 CM2
ECHO AV AREA/BSA PEAK VELOCITY: 1.6 CM2/M2
ECHO AV PEAK GRADIENT: 2.96 MMHG
ECHO AV PEAK VELOCITY: 86.1 CM/S
ECHO EST RA PRESSURE: 10 MMHG
ECHO LA MAJOR AXIS: 2.6 CM
ECHO LA MINOR AXIS: 1.75 CM
ECHO LV E' SEPTAL VELOCITY: 3.78 CM/S
ECHO LV INTERNAL DIMENSION DIASTOLIC: 3.58 CM (ref 3.9–5.3)
ECHO LV INTERNAL DIMENSION SYSTOLIC: 3.24 CM
ECHO LV IVSD: 1.23 CM (ref 0.6–0.9)
ECHO LV IVSS: 1.32 CM
ECHO LV MASS 2D: 178.5 G (ref 67–162)
ECHO LV MASS INDEX 2D: 120.4 G/M2 (ref 43–95)
ECHO LV POSTERIOR WALL DIASTOLIC: 1.57 CM (ref 0.6–0.9)
ECHO LV POSTERIOR WALL SYSTOLIC: 1.52 CM
ECHO LVOT DIAM: 1.97 CM
ECHO LVOT PEAK GRADIENT: 1.69 MMHG
ECHO LVOT PEAK VELOCITY: 64.95 CM/S
ECHO MV A VELOCITY: 77.04 CM/S
ECHO MV E DECELERATION TIME (DT): 179.29 MS
ECHO MV E VELOCITY: 42.6 CM/S
ECHO MV E/A RATIO: 0.55
ECHO MV E/E' SEPTAL: 11.27
ECHO RIGHT VENTRICULAR SYSTOLIC PRESSURE (RVSP): 27.14 MMHG
ECHO RV INTERNAL DIMENSION: 3.08 CM
ECHO TV REGURGITANT MAX VELOCITY: 201.35 CM/S
ECHO TV REGURGITANT MAX VELOCITY: 300.91 CM/S
ECHO TV REGURGITANT PEAK GRADIENT: 17.14 MMHG
EOSINOPHIL # BLD: 0 K/UL (ref 0–0.4)
EOSINOPHIL # BLD: 0.1 K/UL (ref 0–0.4)
EOSINOPHIL # BLD: 0.2 K/UL (ref 0–0.4)
EOSINOPHIL # BLD: 0.3 K/UL (ref 0–0.4)
EOSINOPHIL # BLD: 0.3 K/UL (ref 0–0.4)
EOSINOPHIL # BLD: 0.6 K/UL (ref 0–0.4)
EOSINOPHIL # BLD: 0.8 K/UL (ref 0–0.4)
EOSINOPHIL NFR BLD: 0 % (ref 0–7)
EOSINOPHIL NFR BLD: 1 % (ref 0–7)
EOSINOPHIL NFR BLD: 2 % (ref 0–7)
EOSINOPHIL NFR BLD: 4 % (ref 0–7)
EOSINOPHIL NFR BLD: 4 % (ref 0–7)
EPITH CASTS URNS QL MICRO: ABNORMAL /LPF
EPITH CASTS URNS QL MICRO: ABNORMAL /LPF
ERYTHROCYTE [DISTWIDTH] IN BLOOD BY AUTOMATED COUNT: 15 % (ref 11.5–14.5)
ERYTHROCYTE [DISTWIDTH] IN BLOOD BY AUTOMATED COUNT: 15.2 % (ref 11.5–14.5)
ERYTHROCYTE [DISTWIDTH] IN BLOOD BY AUTOMATED COUNT: 15.2 % (ref 11.5–14.5)
ERYTHROCYTE [DISTWIDTH] IN BLOOD BY AUTOMATED COUNT: 15.4 % (ref 11.5–14.5)
ERYTHROCYTE [DISTWIDTH] IN BLOOD BY AUTOMATED COUNT: 15.4 % (ref 11.5–14.5)
ERYTHROCYTE [DISTWIDTH] IN BLOOD BY AUTOMATED COUNT: 15.5 % (ref 11.5–14.5)
ERYTHROCYTE [DISTWIDTH] IN BLOOD BY AUTOMATED COUNT: 15.7 % (ref 11.5–14.5)
ERYTHROCYTE [DISTWIDTH] IN BLOOD BY AUTOMATED COUNT: 15.7 % (ref 11.5–14.5)
ERYTHROCYTE [DISTWIDTH] IN BLOOD BY AUTOMATED COUNT: 15.8 % (ref 11.5–14.5)
ERYTHROCYTE [DISTWIDTH] IN BLOOD BY AUTOMATED COUNT: 15.8 % (ref 11.5–14.5)
ERYTHROCYTE [DISTWIDTH] IN BLOOD BY AUTOMATED COUNT: 16 % (ref 11.5–14.5)
ERYTHROCYTE [DISTWIDTH] IN BLOOD BY AUTOMATED COUNT: 16.6 % (ref 11.5–14.5)
ERYTHROCYTE [DISTWIDTH] IN BLOOD BY AUTOMATED COUNT: 16.7 % (ref 11.5–14.5)
EST. AVERAGE GLUCOSE BLD GHB EST-MCNC: 108 MG/DL
FERRITIN SERPL-MCNC: 440 NG/ML (ref 26–388)
FOLATE SERPL-MCNC: 4 NG/ML (ref 5–21)
GLOBULIN SER CALC-MCNC: 4.1 G/DL (ref 2–4)
GLOBULIN SER CALC-MCNC: 4.8 G/DL (ref 2–4)
GLOBULIN SER CALC-MCNC: 5.2 G/DL (ref 2–4)
GLOBULIN SER CALC-MCNC: 5.3 G/DL (ref 2–4)
GLOBULIN SER CALC-MCNC: 5.5 G/DL (ref 2–4)
GLOBULIN SER CALC-MCNC: 5.8 G/DL (ref 2–4)
GLUCOSE BLD STRIP.AUTO-MCNC: 101 MG/DL (ref 65–100)
GLUCOSE BLD STRIP.AUTO-MCNC: 103 MG/DL (ref 65–100)
GLUCOSE BLD STRIP.AUTO-MCNC: 105 MG/DL (ref 65–100)
GLUCOSE BLD STRIP.AUTO-MCNC: 105 MG/DL (ref 65–100)
GLUCOSE BLD STRIP.AUTO-MCNC: 107 MG/DL (ref 65–100)
GLUCOSE BLD STRIP.AUTO-MCNC: 109 MG/DL (ref 65–100)
GLUCOSE BLD STRIP.AUTO-MCNC: 111 MG/DL (ref 65–100)
GLUCOSE BLD STRIP.AUTO-MCNC: 112 MG/DL (ref 65–100)
GLUCOSE BLD STRIP.AUTO-MCNC: 112 MG/DL (ref 65–100)
GLUCOSE BLD STRIP.AUTO-MCNC: 115 MG/DL (ref 65–100)
GLUCOSE BLD STRIP.AUTO-MCNC: 117 MG/DL (ref 65–100)
GLUCOSE BLD STRIP.AUTO-MCNC: 122 MG/DL (ref 65–100)
GLUCOSE BLD STRIP.AUTO-MCNC: 122 MG/DL (ref 65–100)
GLUCOSE BLD STRIP.AUTO-MCNC: 128 MG/DL (ref 65–100)
GLUCOSE BLD STRIP.AUTO-MCNC: 128 MG/DL (ref 65–100)
GLUCOSE BLD STRIP.AUTO-MCNC: 132 MG/DL (ref 65–100)
GLUCOSE BLD STRIP.AUTO-MCNC: 133 MG/DL (ref 65–100)
GLUCOSE BLD STRIP.AUTO-MCNC: 135 MG/DL (ref 65–100)
GLUCOSE BLD STRIP.AUTO-MCNC: 135 MG/DL (ref 65–100)
GLUCOSE BLD STRIP.AUTO-MCNC: 137 MG/DL (ref 65–100)
GLUCOSE BLD STRIP.AUTO-MCNC: 138 MG/DL (ref 65–100)
GLUCOSE BLD STRIP.AUTO-MCNC: 140 MG/DL (ref 65–100)
GLUCOSE BLD STRIP.AUTO-MCNC: 141 MG/DL (ref 65–100)
GLUCOSE BLD STRIP.AUTO-MCNC: 141 MG/DL (ref 65–100)
GLUCOSE BLD STRIP.AUTO-MCNC: 144 MG/DL (ref 65–100)
GLUCOSE BLD STRIP.AUTO-MCNC: 146 MG/DL (ref 65–100)
GLUCOSE BLD STRIP.AUTO-MCNC: 149 MG/DL (ref 65–100)
GLUCOSE BLD STRIP.AUTO-MCNC: 150 MG/DL (ref 65–100)
GLUCOSE BLD STRIP.AUTO-MCNC: 151 MG/DL (ref 65–100)
GLUCOSE BLD STRIP.AUTO-MCNC: 152 MG/DL (ref 65–100)
GLUCOSE BLD STRIP.AUTO-MCNC: 156 MG/DL (ref 65–100)
GLUCOSE BLD STRIP.AUTO-MCNC: 160 MG/DL (ref 65–100)
GLUCOSE BLD STRIP.AUTO-MCNC: 167 MG/DL (ref 65–100)
GLUCOSE BLD STRIP.AUTO-MCNC: 167 MG/DL (ref 65–100)
GLUCOSE BLD STRIP.AUTO-MCNC: 172 MG/DL (ref 65–100)
GLUCOSE BLD STRIP.AUTO-MCNC: 173 MG/DL (ref 65–100)
GLUCOSE BLD STRIP.AUTO-MCNC: 178 MG/DL (ref 65–100)
GLUCOSE BLD STRIP.AUTO-MCNC: 246 MG/DL (ref 65–100)
GLUCOSE BLD STRIP.AUTO-MCNC: 84 MG/DL (ref 65–100)
GLUCOSE BLD STRIP.AUTO-MCNC: 94 MG/DL (ref 65–100)
GLUCOSE BLD STRIP.AUTO-MCNC: 99 MG/DL (ref 65–100)
GLUCOSE SERPL-MCNC: 100 MG/DL (ref 65–100)
GLUCOSE SERPL-MCNC: 104 MG/DL (ref 65–100)
GLUCOSE SERPL-MCNC: 113 MG/DL (ref 65–100)
GLUCOSE SERPL-MCNC: 154 MG/DL (ref 65–100)
GLUCOSE SERPL-MCNC: 154 MG/DL (ref 65–100)
GLUCOSE SERPL-MCNC: 156 MG/DL (ref 65–100)
GLUCOSE SERPL-MCNC: 157 MG/DL (ref 65–100)
GLUCOSE SERPL-MCNC: 167 MG/DL (ref 65–100)
GLUCOSE SERPL-MCNC: 81 MG/DL (ref 65–100)
GLUCOSE SERPL-MCNC: 92 MG/DL (ref 65–100)
GLUCOSE SERPL-MCNC: 96 MG/DL (ref 65–100)
GLUCOSE SERPL-MCNC: 98 MG/DL (ref 65–100)
GLUCOSE UR STRIP.AUTO-MCNC: NEGATIVE MG/DL
GLUCOSE UR STRIP.AUTO-MCNC: NEGATIVE MG/DL
HBA1C MFR BLD: 5.4 % (ref 4–5.6)
HCT VFR BLD AUTO: 21.2 % (ref 35–47)
HCT VFR BLD AUTO: 22.1 % (ref 35–47)
HCT VFR BLD AUTO: 24.5 % (ref 35–47)
HCT VFR BLD AUTO: 25.3 % (ref 35–47)
HCT VFR BLD AUTO: 25.7 % (ref 35–47)
HCT VFR BLD AUTO: 26.9 % (ref 35–47)
HCT VFR BLD AUTO: 30.9 % (ref 35–47)
HCT VFR BLD AUTO: 31.9 % (ref 35–47)
HCT VFR BLD AUTO: 32.8 % (ref 35–47)
HCT VFR BLD AUTO: 34.7 % (ref 35–47)
HCT VFR BLD AUTO: 35.7 % (ref 35–47)
HCT VFR BLD AUTO: 37.5 % (ref 35–47)
HCT VFR BLD AUTO: 37.7 % (ref 35–47)
HEMOCCULT STL QL: POSITIVE
HGB BLD-MCNC: 10 G/DL (ref 11.5–16)
HGB BLD-MCNC: 10 G/DL (ref 11.5–16)
HGB BLD-MCNC: 10.3 G/DL (ref 11.5–16)
HGB BLD-MCNC: 11.1 G/DL (ref 11.5–16)
HGB BLD-MCNC: 11.3 G/DL (ref 11.5–16)
HGB BLD-MCNC: 11.5 G/DL (ref 11.5–16)
HGB BLD-MCNC: 11.6 G/DL (ref 11.5–16)
HGB BLD-MCNC: 6.6 G/DL (ref 11.5–16)
HGB BLD-MCNC: 7.3 G/DL (ref 11.5–16)
HGB BLD-MCNC: 7.8 G/DL (ref 11.5–16)
HGB BLD-MCNC: 8 G/DL (ref 11.5–16)
HGB BLD-MCNC: 8.2 G/DL (ref 11.5–16)
HGB BLD-MCNC: 8.3 G/DL (ref 11.5–16)
HGB UR QL STRIP: ABNORMAL
HGB UR QL STRIP: NEGATIVE
IMM GRANULOCYTES # BLD AUTO: 0 K/UL (ref 0–0.04)
IMM GRANULOCYTES # BLD AUTO: 0.2 K/UL (ref 0–0.04)
IMM GRANULOCYTES # BLD AUTO: 0.2 K/UL (ref 0–0.04)
IMM GRANULOCYTES NFR BLD AUTO: 0 % (ref 0–0.5)
IMM GRANULOCYTES NFR BLD AUTO: 1 % (ref 0–0.5)
IMM GRANULOCYTES NFR BLD AUTO: 1 % (ref 0–0.5)
IRON SATN MFR SERPL: 89 % (ref 20–50)
IRON SERPL-MCNC: 40 UG/DL (ref 35–150)
KETONES UR QL STRIP.AUTO: ABNORMAL MG/DL
KETONES UR QL STRIP.AUTO: NEGATIVE MG/DL
KOH PREP SPEC: NORMAL
LACTATE BLD-SCNC: 1.32 MMOL/L (ref 0.4–2)
LACTATE SERPL-SCNC: 1.1 MMOL/L (ref 0.4–2)
LEUKOCYTE ESTERASE UR QL STRIP.AUTO: ABNORMAL
LEUKOCYTE ESTERASE UR QL STRIP.AUTO: ABNORMAL
LYMPHOCYTES # BLD: 0.3 K/UL (ref 0.8–3.5)
LYMPHOCYTES # BLD: 0.8 K/UL (ref 0.8–3.5)
LYMPHOCYTES # BLD: 0.8 K/UL (ref 0.8–3.5)
LYMPHOCYTES # BLD: 0.9 K/UL (ref 0.8–3.5)
LYMPHOCYTES # BLD: 0.9 K/UL (ref 0.8–3.5)
LYMPHOCYTES # BLD: 1 K/UL (ref 0.8–3.5)
LYMPHOCYTES # BLD: 1.1 K/UL (ref 0.8–3.5)
LYMPHOCYTES # BLD: 1.1 K/UL (ref 0.8–3.5)
LYMPHOCYTES # BLD: 1.2 K/UL (ref 0.8–3.5)
LYMPHOCYTES # BLD: 1.4 K/UL (ref 0.8–3.5)
LYMPHOCYTES # BLD: 1.4 K/UL (ref 0.8–3.5)
LYMPHOCYTES # BLD: 1.6 K/UL (ref 0.8–3.5)
LYMPHOCYTES NFR BLD: 1 % (ref 12–49)
LYMPHOCYTES NFR BLD: 10 % (ref 12–49)
LYMPHOCYTES NFR BLD: 10 % (ref 12–49)
LYMPHOCYTES NFR BLD: 3 % (ref 12–49)
LYMPHOCYTES NFR BLD: 4 % (ref 12–49)
LYMPHOCYTES NFR BLD: 5 % (ref 12–49)
LYMPHOCYTES NFR BLD: 5 % (ref 12–49)
LYMPHOCYTES NFR BLD: 6 % (ref 12–49)
LYMPHOCYTES NFR BLD: 6 % (ref 12–49)
LYMPHOCYTES NFR BLD: 7 % (ref 12–49)
MAGNESIUM SERPL-MCNC: 1.9 MG/DL (ref 1.6–2.4)
MAGNESIUM SERPL-MCNC: 2 MG/DL (ref 1.6–2.4)
MAGNESIUM SERPL-MCNC: 2 MG/DL (ref 1.6–2.4)
MAGNESIUM SERPL-MCNC: 2.1 MG/DL (ref 1.6–2.4)
MAGNESIUM SERPL-MCNC: 2.4 MG/DL (ref 1.6–2.4)
MAGNESIUM SERPL-MCNC: 2.4 MG/DL (ref 1.6–2.4)
MAGNESIUM SERPL-MCNC: 2.7 MG/DL (ref 1.6–2.4)
MAGNESIUM SERPL-MCNC: 2.7 MG/DL (ref 1.6–2.4)
MAGNESIUM SERPL-MCNC: 2.8 MG/DL (ref 1.6–2.4)
MCH RBC QN AUTO: 28.7 PG (ref 26–34)
MCH RBC QN AUTO: 28.9 PG (ref 26–34)
MCH RBC QN AUTO: 29 PG (ref 26–34)
MCH RBC QN AUTO: 29 PG (ref 26–34)
MCH RBC QN AUTO: 29.1 PG (ref 26–34)
MCH RBC QN AUTO: 29.2 PG (ref 26–34)
MCH RBC QN AUTO: 29.3 PG (ref 26–34)
MCH RBC QN AUTO: 29.4 PG (ref 26–34)
MCH RBC QN AUTO: 29.4 PG (ref 26–34)
MCH RBC QN AUTO: 29.5 PG (ref 26–34)
MCH RBC QN AUTO: 29.7 PG (ref 26–34)
MCHC RBC AUTO-ENTMCNC: 30.5 G/DL (ref 30–36.5)
MCHC RBC AUTO-ENTMCNC: 30.7 G/DL (ref 30–36.5)
MCHC RBC AUTO-ENTMCNC: 30.8 G/DL (ref 30–36.5)
MCHC RBC AUTO-ENTMCNC: 30.8 G/DL (ref 30–36.5)
MCHC RBC AUTO-ENTMCNC: 30.9 G/DL (ref 30–36.5)
MCHC RBC AUTO-ENTMCNC: 31.1 G/DL (ref 30–36.5)
MCHC RBC AUTO-ENTMCNC: 31.1 G/DL (ref 30–36.5)
MCHC RBC AUTO-ENTMCNC: 31.9 G/DL (ref 30–36.5)
MCHC RBC AUTO-ENTMCNC: 32.3 G/DL (ref 30–36.5)
MCHC RBC AUTO-ENTMCNC: 32.4 G/DL (ref 30–36.5)
MCHC RBC AUTO-ENTMCNC: 32.6 G/DL (ref 30–36.5)
MCHC RBC AUTO-ENTMCNC: 32.7 G/DL (ref 30–36.5)
MCHC RBC AUTO-ENTMCNC: 33 G/DL (ref 30–36.5)
MCV RBC AUTO: 89.3 FL (ref 80–99)
MCV RBC AUTO: 89.8 FL (ref 80–99)
MCV RBC AUTO: 90.1 FL (ref 80–99)
MCV RBC AUTO: 90.4 FL (ref 80–99)
MCV RBC AUTO: 90.4 FL (ref 80–99)
MCV RBC AUTO: 92.1 FL (ref 80–99)
MCV RBC AUTO: 93.4 FL (ref 80–99)
MCV RBC AUTO: 94 FL (ref 80–99)
MCV RBC AUTO: 94.1 FL (ref 80–99)
MCV RBC AUTO: 94.2 FL (ref 80–99)
MCV RBC AUTO: 94.7 FL (ref 80–99)
MCV RBC AUTO: 94.7 FL (ref 80–99)
MCV RBC AUTO: 94.8 FL (ref 80–99)
METAMYELOCYTES NFR BLD MANUAL: 1 %
METAMYELOCYTES NFR BLD MANUAL: 2 %
MONOCYTES # BLD: 0.2 K/UL (ref 0–1)
MONOCYTES # BLD: 0.3 K/UL (ref 0–1)
MONOCYTES # BLD: 0.3 K/UL (ref 0–1)
MONOCYTES # BLD: 0.4 K/UL (ref 0–1)
MONOCYTES # BLD: 0.5 K/UL (ref 0–1)
MONOCYTES # BLD: 0.5 K/UL (ref 0–1)
MONOCYTES # BLD: 0.6 K/UL (ref 0–1)
MONOCYTES # BLD: 0.8 K/UL (ref 0–1)
MONOCYTES # BLD: 0.8 K/UL (ref 0–1)
MONOCYTES # BLD: 1.4 K/UL (ref 0–1)
MONOCYTES # BLD: 2.1 K/UL (ref 0–1)
MONOCYTES # BLD: 2.4 K/UL (ref 0–1)
MONOCYTES NFR BLD: 1 % (ref 5–13)
MONOCYTES NFR BLD: 10 % (ref 5–13)
MONOCYTES NFR BLD: 2 % (ref 5–13)
MONOCYTES NFR BLD: 2 % (ref 5–13)
MONOCYTES NFR BLD: 3 % (ref 5–13)
MONOCYTES NFR BLD: 3 % (ref 5–13)
MONOCYTES NFR BLD: 5 % (ref 5–13)
MONOCYTES NFR BLD: 5 % (ref 5–13)
MONOCYTES NFR BLD: 7 % (ref 5–13)
MONOCYTES NFR BLD: 7 % (ref 5–13)
MUCOUS THREADS URNS QL MICRO: ABNORMAL /LPF
MYELOCYTES NFR BLD MANUAL: 2 %
MYELOCYTES NFR BLD MANUAL: 2 %
MYELOCYTES NFR BLD MANUAL: 3 %
N GONORRHOEA DNA SPEC QL NAA+PROBE: NEGATIVE
NEUTS BAND NFR BLD MANUAL: 1 %
NEUTS BAND NFR BLD MANUAL: 1 %
NEUTS BAND NFR BLD MANUAL: 2 %
NEUTS BAND NFR BLD MANUAL: 3 %
NEUTS BAND NFR BLD MANUAL: 4 %
NEUTS BAND NFR BLD MANUAL: 7 %
NEUTS SEG # BLD: 11 K/UL (ref 1.8–8)
NEUTS SEG # BLD: 12.7 K/UL (ref 1.8–8)
NEUTS SEG # BLD: 13.5 K/UL (ref 1.8–8)
NEUTS SEG # BLD: 14.3 K/UL (ref 1.8–8)
NEUTS SEG # BLD: 16 K/UL (ref 1.8–8)
NEUTS SEG # BLD: 17.3 K/UL (ref 1.8–8)
NEUTS SEG # BLD: 18.5 K/UL (ref 1.8–8)
NEUTS SEG # BLD: 24.1 K/UL (ref 1.8–8)
NEUTS SEG # BLD: 25.5 K/UL (ref 1.8–8)
NEUTS SEG # BLD: 25.7 K/UL (ref 1.8–8)
NEUTS SEG # BLD: 27.3 K/UL (ref 1.8–8)
NEUTS SEG # BLD: 30.3 K/UL (ref 1.8–8)
NEUTS SEG NFR BLD: 79 % (ref 32–75)
NEUTS SEG NFR BLD: 79 % (ref 32–75)
NEUTS SEG NFR BLD: 80 % (ref 32–75)
NEUTS SEG NFR BLD: 82 % (ref 32–75)
NEUTS SEG NFR BLD: 86 % (ref 32–75)
NEUTS SEG NFR BLD: 87 % (ref 32–75)
NEUTS SEG NFR BLD: 88 % (ref 32–75)
NEUTS SEG NFR BLD: 88 % (ref 32–75)
NEUTS SEG NFR BLD: 91 % (ref 32–75)
NEUTS SEG NFR BLD: 92 % (ref 32–75)
NEUTS SEG NFR BLD: 93 % (ref 32–75)
NEUTS SEG NFR BLD: 93 % (ref 32–75)
NITRITE UR QL STRIP.AUTO: NEGATIVE
NITRITE UR QL STRIP.AUTO: NEGATIVE
NRBC # BLD: 0 K/UL (ref 0–0.01)
NRBC # BLD: 0.02 K/UL (ref 0–0.01)
NRBC BLD-RTO: 0 PER 100 WBC
NRBC BLD-RTO: 0.1 PER 100 WBC
P-R INTERVAL, ECG05: 166 MS
PH UR STRIP: 5.5 [PH] (ref 5–8)
PH UR STRIP: 5.5 [PH] (ref 5–8)
PHOSPHATE SERPL-MCNC: 2.2 MG/DL (ref 2.6–4.7)
PHOSPHATE SERPL-MCNC: 2.8 MG/DL (ref 2.6–4.7)
PHOSPHATE SERPL-MCNC: 2.9 MG/DL (ref 2.6–4.7)
PHOSPHATE SERPL-MCNC: 3 MG/DL (ref 2.6–4.7)
PHOSPHATE SERPL-MCNC: 3.4 MG/DL (ref 2.6–4.7)
PHOSPHATE SERPL-MCNC: 3.5 MG/DL (ref 2.6–4.7)
PHOSPHATE SERPL-MCNC: 3.8 MG/DL (ref 2.6–4.7)
PHOSPHATE SERPL-MCNC: 4.2 MG/DL (ref 2.6–4.7)
PLATELET # BLD AUTO: 239 K/UL (ref 150–400)
PLATELET # BLD AUTO: 242 K/UL (ref 150–400)
PLATELET # BLD AUTO: 255 K/UL (ref 150–400)
PLATELET # BLD AUTO: 287 K/UL (ref 150–400)
PLATELET # BLD AUTO: 308 K/UL (ref 150–400)
PLATELET # BLD AUTO: 328 K/UL (ref 150–400)
PLATELET # BLD AUTO: 363 K/UL (ref 150–400)
PLATELET # BLD AUTO: 365 K/UL (ref 150–400)
PLATELET # BLD AUTO: 373 K/UL (ref 150–400)
PLATELET # BLD AUTO: 390 K/UL (ref 150–400)
PLATELET # BLD AUTO: 405 K/UL (ref 150–400)
PLATELET # BLD AUTO: 526 K/UL (ref 150–400)
PLATELET # BLD AUTO: 591 K/UL (ref 150–400)
PMV BLD AUTO: 10 FL (ref 8.9–12.9)
PMV BLD AUTO: 10.2 FL (ref 8.9–12.9)
PMV BLD AUTO: 10.6 FL (ref 8.9–12.9)
PMV BLD AUTO: 10.6 FL (ref 8.9–12.9)
PMV BLD AUTO: 10.8 FL (ref 8.9–12.9)
PMV BLD AUTO: 10.9 FL (ref 8.9–12.9)
PMV BLD AUTO: 11 FL (ref 8.9–12.9)
PMV BLD AUTO: 11.3 FL (ref 8.9–12.9)
PMV BLD AUTO: 11.4 FL (ref 8.9–12.9)
PMV BLD AUTO: 9.9 FL (ref 8.9–12.9)
POTASSIUM SERPL-SCNC: 2.9 MMOL/L (ref 3.5–5.1)
POTASSIUM SERPL-SCNC: 3 MMOL/L (ref 3.5–5.1)
POTASSIUM SERPL-SCNC: 3.4 MMOL/L (ref 3.5–5.1)
POTASSIUM SERPL-SCNC: 3.5 MMOL/L (ref 3.5–5.1)
POTASSIUM SERPL-SCNC: 3.5 MMOL/L (ref 3.5–5.1)
POTASSIUM SERPL-SCNC: 3.6 MMOL/L (ref 3.5–5.1)
POTASSIUM SERPL-SCNC: 3.7 MMOL/L (ref 3.5–5.1)
POTASSIUM SERPL-SCNC: 3.9 MMOL/L (ref 3.5–5.1)
POTASSIUM SERPL-SCNC: 4.3 MMOL/L (ref 3.5–5.1)
POTASSIUM SERPL-SCNC: 4.3 MMOL/L (ref 3.5–5.1)
POTASSIUM SERPL-SCNC: 4.5 MMOL/L (ref 3.5–5.1)
POTASSIUM SERPL-SCNC: 4.6 MMOL/L (ref 3.5–5.1)
PROT SERPL-MCNC: 5.5 G/DL (ref 6.4–8.2)
PROT SERPL-MCNC: 6.5 G/DL (ref 6.4–8.2)
PROT SERPL-MCNC: 7 G/DL (ref 6.4–8.2)
PROT SERPL-MCNC: 7.1 G/DL (ref 6.4–8.2)
PROT SERPL-MCNC: 7.5 G/DL (ref 6.4–8.2)
PROT SERPL-MCNC: 7.9 G/DL (ref 6.4–8.2)
PROT UR STRIP-MCNC: 30 MG/DL
PROT UR STRIP-MCNC: ABNORMAL MG/DL
Q-T INTERVAL, ECG07: 396 MS
QRS DURATION, ECG06: 100 MS
QTC CALCULATION (BEZET), ECG08: 479 MS
RBC # BLD AUTO: 2.27 M/UL (ref 3.8–5.2)
RBC # BLD AUTO: 2.46 M/UL (ref 3.8–5.2)
RBC # BLD AUTO: 2.67 M/UL (ref 3.8–5.2)
RBC # BLD AUTO: 2.71 M/UL (ref 3.8–5.2)
RBC # BLD AUTO: 2.79 M/UL (ref 3.8–5.2)
RBC # BLD AUTO: 2.86 M/UL (ref 3.8–5.2)
RBC # BLD AUTO: 3.46 M/UL (ref 3.8–5.2)
RBC # BLD AUTO: 3.49 M/UL (ref 3.8–5.2)
RBC # BLD AUTO: 3.54 M/UL (ref 3.8–5.2)
RBC # BLD AUTO: 3.79 M/UL (ref 3.8–5.2)
RBC # BLD AUTO: 3.84 M/UL (ref 3.8–5.2)
RBC # BLD AUTO: 3.96 M/UL (ref 3.8–5.2)
RBC # BLD AUTO: 3.98 M/UL (ref 3.8–5.2)
RBC #/AREA URNS HPF: ABNORMAL /HPF (ref 0–5)
RBC #/AREA URNS HPF: ABNORMAL /HPF (ref 0–5)
RBC MORPH BLD: ABNORMAL
RPR SER QL: NONREACTIVE
SAMPLE TYPE: NORMAL
SAMPLES BEING HELD,HOLD: NORMAL
SAMPLES BEING HELD,HOLD: NORMAL
SERVICE CMNT-IMP: ABNORMAL
SERVICE CMNT-IMP: NORMAL
SODIUM SERPL-SCNC: 141 MMOL/L (ref 136–145)
SODIUM SERPL-SCNC: 142 MMOL/L (ref 136–145)
SODIUM SERPL-SCNC: 142 MMOL/L (ref 136–145)
SODIUM SERPL-SCNC: 144 MMOL/L (ref 136–145)
SODIUM SERPL-SCNC: 147 MMOL/L (ref 136–145)
SODIUM SERPL-SCNC: 150 MMOL/L (ref 136–145)
SODIUM SERPL-SCNC: 154 MMOL/L (ref 136–145)
SODIUM SERPL-SCNC: 154 MMOL/L (ref 136–145)
SODIUM SERPL-SCNC: 156 MMOL/L (ref 136–145)
SODIUM SERPL-SCNC: 160 MMOL/L (ref 136–145)
SOURCE, COVRS: NORMAL
SP GR UR REFRACTOMETRY: 1.02 (ref 1–1.03)
SP GR UR REFRACTOMETRY: 1.02 (ref 1–1.03)
SPECIMEN SOURCE: NORMAL
T VAGINALIS VAG QL WET PREP: NORMAL
TIBC SERPL-MCNC: 45 UG/DL (ref 250–450)
TROPONIN I SERPL-MCNC: <0.05 NG/ML
UA: UC IF INDICATED,UAUC: ABNORMAL
UA: UC IF INDICATED,UAUC: ABNORMAL
UROBILINOGEN UR QL STRIP.AUTO: 0.2 EU/DL (ref 0.2–1)
UROBILINOGEN UR QL STRIP.AUTO: 1 EU/DL (ref 0.2–1)
VENTRICULAR RATE, ECG03: 88 BPM
VIT B12 SERPL-MCNC: 438 PG/ML (ref 193–986)
WBC # BLD AUTO: 13.9 K/UL (ref 3.6–11)
WBC # BLD AUTO: 15.8 K/UL (ref 3.6–11)
WBC # BLD AUTO: 15.9 K/UL (ref 3.6–11)
WBC # BLD AUTO: 16.1 K/UL (ref 3.6–11)
WBC # BLD AUTO: 18.8 K/UL (ref 3.6–11)
WBC # BLD AUTO: 19.1 K/UL (ref 3.6–11)
WBC # BLD AUTO: 20.5 K/UL (ref 3.6–11)
WBC # BLD AUTO: 21.5 K/UL (ref 3.6–11)
WBC # BLD AUTO: 25.1 K/UL (ref 3.6–11)
WBC # BLD AUTO: 27 K/UL (ref 3.6–11)
WBC # BLD AUTO: 29 K/UL (ref 3.6–11)
WBC # BLD AUTO: 29.6 K/UL (ref 3.6–11)
WBC # BLD AUTO: 34.1 K/UL (ref 3.6–11)
WBC URNS QL MICRO: ABNORMAL /HPF (ref 0–4)
WBC URNS QL MICRO: ABNORMAL /HPF (ref 0–4)

## 2021-01-01 PROCEDURE — 2709999900 HC NON-CHARGEABLE SUPPLY

## 2021-01-01 PROCEDURE — G8420 CALC BMI NORM PARAMETERS: HCPCS | Performed by: INTERNAL MEDICINE

## 2021-01-01 PROCEDURE — P9047 ALBUMIN (HUMAN), 25%, 50ML: HCPCS | Performed by: STUDENT IN AN ORGANIZED HEALTH CARE EDUCATION/TRAINING PROGRAM

## 2021-01-01 PROCEDURE — 0651 HSPC ROUTINE HOME CARE

## 2021-01-01 PROCEDURE — G0156 HHCP-SVS OF AIDE,EA 15 MIN: HCPCS

## 2021-01-01 PROCEDURE — 80048 BASIC METABOLIC PNL TOTAL CA: CPT

## 2021-01-01 PROCEDURE — 74011250636 HC RX REV CODE- 250/636: Performed by: ORTHOPAEDIC SURGERY

## 2021-01-01 PROCEDURE — 74011250636 HC RX REV CODE- 250/636: Performed by: STUDENT IN AN ORGANIZED HEALTH CARE EDUCATION/TRAINING PROGRAM

## 2021-01-01 PROCEDURE — 82962 GLUCOSE BLOOD TEST: CPT

## 2021-01-01 PROCEDURE — 74011000258 HC RX REV CODE- 258: Performed by: STUDENT IN AN ORGANIZED HEALTH CARE EDUCATION/TRAINING PROGRAM

## 2021-01-01 PROCEDURE — 74011000258 HC RX REV CODE- 258: Performed by: ORTHOPAEDIC SURGERY

## 2021-01-01 PROCEDURE — HOSPICE MEDICATION HC HH HOSPICE MEDICATION

## 2021-01-01 PROCEDURE — 83735 ASSAY OF MAGNESIUM: CPT

## 2021-01-01 PROCEDURE — 84484 ASSAY OF TROPONIN QUANT: CPT

## 2021-01-01 PROCEDURE — 87186 SC STD MICRODIL/AGAR DIL: CPT

## 2021-01-01 PROCEDURE — 74011000258 HC RX REV CODE- 258: Performed by: HOSPITALIST

## 2021-01-01 PROCEDURE — 65660000000 HC RM CCU STEPDOWN

## 2021-01-01 PROCEDURE — APPSS45 APP SPLIT SHARED TIME 31-45 MINUTES: Performed by: NURSE PRACTITIONER

## 2021-01-01 PROCEDURE — 70450 CT HEAD/BRAIN W/O DYE: CPT

## 2021-01-01 PROCEDURE — 82746 ASSAY OF FOLIC ACID SERUM: CPT

## 2021-01-01 PROCEDURE — 85025 COMPLETE CBC W/AUTO DIFF WBC: CPT

## 2021-01-01 PROCEDURE — G0299 HHS/HOSPICE OF RN EA 15 MIN: HCPCS

## 2021-01-01 PROCEDURE — 72193 CT PELVIS W/DYE: CPT

## 2021-01-01 PROCEDURE — 76010000162 HC OR TIME 1.5 TO 2 HR INTENSV-TIER 1: Performed by: ORTHOPAEDIC SURGERY

## 2021-01-01 PROCEDURE — 74011250636 HC RX REV CODE- 250/636: Performed by: EMERGENCY MEDICINE

## 2021-01-01 PROCEDURE — 74011250637 HC RX REV CODE- 250/637: Performed by: HOSPITALIST

## 2021-01-01 PROCEDURE — 99232 SBSQ HOSP IP/OBS MODERATE 35: CPT | Performed by: INTERNAL MEDICINE

## 2021-01-01 PROCEDURE — 74011000258 HC RX REV CODE- 258: Performed by: EMERGENCY MEDICINE

## 2021-01-01 PROCEDURE — G8754 DIAS BP LESS 90: HCPCS | Performed by: INTERNAL MEDICINE

## 2021-01-01 PROCEDURE — 76210000016 HC OR PH I REC 1 TO 1.5 HR: Performed by: ORTHOPAEDIC SURGERY

## 2021-01-01 PROCEDURE — 74011250637 HC RX REV CODE- 250/637: Performed by: STUDENT IN AN ORGANIZED HEALTH CARE EDUCATION/TRAINING PROGRAM

## 2021-01-01 PROCEDURE — G8752 SYS BP LESS 140: HCPCS | Performed by: INTERNAL MEDICINE

## 2021-01-01 PROCEDURE — G0155 HHCP-SVS OF CSW,EA 15 MIN: HCPCS

## 2021-01-01 PROCEDURE — 74011250636 HC RX REV CODE- 250/636: Performed by: ANESTHESIOLOGY

## 2021-01-01 PROCEDURE — 71045 X-RAY EXAM CHEST 1 VIEW: CPT

## 2021-01-01 PROCEDURE — 97110 THERAPEUTIC EXERCISES: CPT

## 2021-01-01 PROCEDURE — 36415 COLL VENOUS BLD VENIPUNCTURE: CPT

## 2021-01-01 PROCEDURE — 74011000250 HC RX REV CODE- 250: Performed by: NURSE ANESTHETIST, CERTIFIED REGISTERED

## 2021-01-01 PROCEDURE — 96365 THER/PROPH/DIAG IV INF INIT: CPT

## 2021-01-01 PROCEDURE — 77030018786 HC NDL GD F/USND BARD -B

## 2021-01-01 PROCEDURE — 74011250637 HC RX REV CODE- 250/637: Performed by: ORTHOPAEDIC SURGERY

## 2021-01-01 PROCEDURE — 74011250636 HC RX REV CODE- 250/636: Performed by: INTERNAL MEDICINE

## 2021-01-01 PROCEDURE — 97530 THERAPEUTIC ACTIVITIES: CPT

## 2021-01-01 PROCEDURE — 77030008027

## 2021-01-01 PROCEDURE — 87040 BLOOD CULTURE FOR BACTERIA: CPT

## 2021-01-01 PROCEDURE — 99223 1ST HOSP IP/OBS HIGH 75: CPT | Performed by: INTERNAL MEDICINE

## 2021-01-01 PROCEDURE — 82550 ASSAY OF CK (CPK): CPT

## 2021-01-01 PROCEDURE — 77030008684 HC TU ET CUF COVD -B: Performed by: NURSE ANESTHETIST, CERTIFIED REGISTERED

## 2021-01-01 PROCEDURE — 0JB70ZZ EXCISION OF BACK SUBCUTANEOUS TISSUE AND FASCIA, OPEN APPROACH: ICD-10-PCS | Performed by: SURGERY

## 2021-01-01 PROCEDURE — 99222 1ST HOSP IP/OBS MODERATE 55: CPT | Performed by: SURGERY

## 2021-01-01 PROCEDURE — 85027 COMPLETE CBC AUTOMATED: CPT

## 2021-01-01 PROCEDURE — 80053 COMPREHEN METABOLIC PANEL: CPT

## 2021-01-01 PROCEDURE — 96361 HYDRATE IV INFUSION ADD-ON: CPT

## 2021-01-01 PROCEDURE — 36600 WITHDRAWAL OF ARTERIAL BLOOD: CPT

## 2021-01-01 PROCEDURE — 83605 ASSAY OF LACTIC ACID: CPT

## 2021-01-01 PROCEDURE — 76010000153 HC OR TIME 1.5 TO 2 HR: Performed by: ORTHOPAEDIC SURGERY

## 2021-01-01 PROCEDURE — 77030003028 HC SUT VCRL J&J -A: Performed by: ORTHOPAEDIC SURGERY

## 2021-01-01 PROCEDURE — 77030014650 HC SEAL MTRX FLOSEL BAXT -C: Performed by: ORTHOPAEDIC SURGERY

## 2021-01-01 PROCEDURE — 84100 ASSAY OF PHOSPHORUS: CPT

## 2021-01-01 PROCEDURE — 99285 EMERGENCY DEPT VISIT HI MDM: CPT

## 2021-01-01 PROCEDURE — 77030018842 HC SOL IRR SOD CL 9% BAXT -A

## 2021-01-01 PROCEDURE — 77030026438 HC STYL ET INTUB CARD -A: Performed by: NURSE ANESTHETIST, CERTIFIED REGISTERED

## 2021-01-01 PROCEDURE — 81001 URINALYSIS AUTO W/SCOPE: CPT

## 2021-01-01 PROCEDURE — 74011250636 HC RX REV CODE- 250/636: Performed by: HOSPITALIST

## 2021-01-01 PROCEDURE — G0300 HHS/HOSPICE OF LPN EA 15 MIN: HCPCS

## 2021-01-01 PROCEDURE — 77010033678 HC OXYGEN DAILY

## 2021-01-01 PROCEDURE — 87086 URINE CULTURE/COLONY COUNT: CPT

## 2021-01-01 PROCEDURE — 11043 DBRDMT MUSC&/FSCA 1ST 20/<: CPT | Performed by: SURGERY

## 2021-01-01 PROCEDURE — 77030040361 HC SLV COMPR DVT MDII -B: Performed by: ORTHOPAEDIC SURGERY

## 2021-01-01 PROCEDURE — 74011000250 HC RX REV CODE- 250: Performed by: INTERNAL MEDICINE

## 2021-01-01 PROCEDURE — 74011000636 HC RX REV CODE- 636: Performed by: GENERAL ACUTE CARE HOSPITAL

## 2021-01-01 PROCEDURE — 3331090004 HSPC SERVICE INTENSITY ADD-ON

## 2021-01-01 PROCEDURE — 77030008462 HC STPLR SKN PROX J&J -A: Performed by: ORTHOPAEDIC SURGERY

## 2021-01-01 PROCEDURE — 74011636637 HC RX REV CODE- 636/637: Performed by: ORTHOPAEDIC SURGERY

## 2021-01-01 PROCEDURE — 93306 TTE W/DOPPLER COMPLETE: CPT

## 2021-01-01 PROCEDURE — 77030020788: Performed by: ORTHOPAEDIC SURGERY

## 2021-01-01 PROCEDURE — 74011000250 HC RX REV CODE- 250: Performed by: NURSE PRACTITIONER

## 2021-01-01 PROCEDURE — 74011636637 HC RX REV CODE- 636/637: Performed by: HOSPITALIST

## 2021-01-01 PROCEDURE — 77030018723 HC ELCTRD BLD COVD -A: Performed by: ORTHOPAEDIC SURGERY

## 2021-01-01 PROCEDURE — 93000 ELECTROCARDIOGRAM COMPLETE: CPT | Performed by: INTERNAL MEDICINE

## 2021-01-01 PROCEDURE — 74011000250 HC RX REV CODE- 250: Performed by: STUDENT IN AN ORGANIZED HEALTH CARE EDUCATION/TRAINING PROGRAM

## 2021-01-01 PROCEDURE — 94760 N-INVAS EAR/PLS OXIMETRY 1: CPT

## 2021-01-01 PROCEDURE — 87077 CULTURE AEROBIC IDENTIFY: CPT

## 2021-01-01 PROCEDURE — G8432 DEP SCR NOT DOC, RNG: HCPCS | Performed by: INTERNAL MEDICINE

## 2021-01-01 PROCEDURE — 87491 CHLMYD TRACH DNA AMP PROBE: CPT

## 2021-01-01 PROCEDURE — 74011000258 HC RX REV CODE- 258: Performed by: NURSE ANESTHETIST, CERTIFIED REGISTERED

## 2021-01-01 PROCEDURE — C1776 JOINT DEVICE (IMPLANTABLE): HCPCS | Performed by: ORTHOPAEDIC SURGERY

## 2021-01-01 PROCEDURE — 65270000029 HC RM PRIVATE

## 2021-01-01 PROCEDURE — 92526 ORAL FUNCTION THERAPY: CPT

## 2021-01-01 PROCEDURE — 87635 SARS-COV-2 COVID-19 AMP PRB: CPT

## 2021-01-01 PROCEDURE — 77030040922 HC BLNKT HYPOTHRM STRY -A

## 2021-01-01 PROCEDURE — 74011250636 HC RX REV CODE- 250/636: Performed by: NURSE ANESTHETIST, CERTIFIED REGISTERED

## 2021-01-01 PROCEDURE — 77030019908 HC STETH ESOPH SIMS -A: Performed by: NURSE ANESTHETIST, CERTIFIED REGISTERED

## 2021-01-01 PROCEDURE — 77030018547 HC SUT ETHBND1 J&J -B: Performed by: ORTHOPAEDIC SURGERY

## 2021-01-01 PROCEDURE — 99495 TRANSJ CARE MGMT MOD F2F 14D: CPT | Performed by: INTERNAL MEDICINE

## 2021-01-01 PROCEDURE — 93306 TTE W/DOPPLER COMPLETE: CPT | Performed by: INTERNAL MEDICINE

## 2021-01-01 PROCEDURE — APPSS60 APP SPLIT SHARED TIME 46-60 MINUTES: Performed by: NURSE PRACTITIONER

## 2021-01-01 PROCEDURE — 77030013079 HC BLNKT BAIR HGGR 3M -A: Performed by: NURSE ANESTHETIST, CERTIFIED REGISTERED

## 2021-01-01 PROCEDURE — 82607 VITAMIN B-12: CPT

## 2021-01-01 PROCEDURE — 51701 INSERT BLADDER CATHETER: CPT

## 2021-01-01 PROCEDURE — 1111F DSCHRG MED/CURRENT MED MERGE: CPT | Performed by: INTERNAL MEDICINE

## 2021-01-01 PROCEDURE — 74011250636 HC RX REV CODE- 250/636: Performed by: NURSE PRACTITIONER

## 2021-01-01 PROCEDURE — 1090F PRES/ABSN URINE INCON ASSESS: CPT | Performed by: INTERNAL MEDICINE

## 2021-01-01 PROCEDURE — C1751 CATH, INF, PER/CENT/MIDLINE: HCPCS

## 2021-01-01 PROCEDURE — 77030033138 HC SUT PGA STRATFX J&J -B: Performed by: ORTHOPAEDIC SURGERY

## 2021-01-01 PROCEDURE — 99284 EMERGENCY DEPT VISIT MOD MDM: CPT

## 2021-01-01 PROCEDURE — 11046 DBRDMT MUSC&/FSCA EA ADDL: CPT | Performed by: SURGERY

## 2021-01-01 PROCEDURE — 76060000034 HC ANESTHESIA 1.5 TO 2 HR: Performed by: ORTHOPAEDIC SURGERY

## 2021-01-01 PROCEDURE — 87147 CULTURE TYPE IMMUNOLOGIC: CPT

## 2021-01-01 PROCEDURE — 82272 OCCULT BLD FECES 1-3 TESTS: CPT

## 2021-01-01 PROCEDURE — 0SRS01Z REPLACEMENT OF LEFT HIP JOINT, FEMORAL SURFACE WITH METAL SYNTHETIC SUBSTITUTE, OPEN APPROACH: ICD-10-PCS | Performed by: ORTHOPAEDIC SURGERY

## 2021-01-01 PROCEDURE — 77030018673: Performed by: ORTHOPAEDIC SURGERY

## 2021-01-01 PROCEDURE — 77030040393 HC DRSG OPTIFOAM GENT MDII -B

## 2021-01-01 PROCEDURE — 74011000258 HC RX REV CODE- 258: Performed by: INTERNAL MEDICINE

## 2021-01-01 PROCEDURE — 87210 SMEAR WET MOUNT SALINE/INK: CPT

## 2021-01-01 PROCEDURE — 77030040392 HC DRSG OPTIFOAM MDII -A

## 2021-01-01 PROCEDURE — G8536 NO DOC ELDER MAL SCRN: HCPCS | Performed by: INTERNAL MEDICINE

## 2021-01-01 PROCEDURE — P9045 ALBUMIN (HUMAN), 5%, 250 ML: HCPCS | Performed by: STUDENT IN AN ORGANIZED HEALTH CARE EDUCATION/TRAINING PROGRAM

## 2021-01-01 PROCEDURE — 93005 ELECTROCARDIOGRAM TRACING: CPT

## 2021-01-01 PROCEDURE — G8400 PT W/DXA NO RESULTS DOC: HCPCS | Performed by: INTERNAL MEDICINE

## 2021-01-01 PROCEDURE — 97166 OT EVAL MOD COMPLEX 45 MIN: CPT

## 2021-01-01 PROCEDURE — 92610 EVALUATE SWALLOWING FUNCTION: CPT | Performed by: SPEECH-LANGUAGE PATHOLOGIST

## 2021-01-01 PROCEDURE — 1101F PT FALLS ASSESS-DOCD LE1/YR: CPT | Performed by: INTERNAL MEDICINE

## 2021-01-01 PROCEDURE — G8427 DOCREV CUR MEDS BY ELIG CLIN: HCPCS | Performed by: INTERNAL MEDICINE

## 2021-01-01 PROCEDURE — 74011250637 HC RX REV CODE- 250/637: Performed by: INTERNAL MEDICINE

## 2021-01-01 PROCEDURE — 82728 ASSAY OF FERRITIN: CPT

## 2021-01-01 PROCEDURE — 82040 ASSAY OF SERUM ALBUMIN: CPT

## 2021-01-01 PROCEDURE — 77030003029 HC SUT VCRL J&J -B: Performed by: ORTHOPAEDIC SURGERY

## 2021-01-01 PROCEDURE — 76937 US GUIDE VASCULAR ACCESS: CPT

## 2021-01-01 PROCEDURE — 74011000250 HC RX REV CODE- 250: Performed by: SURGERY

## 2021-01-01 PROCEDURE — 77030041680 HC PNCL ELECSURG SMK EVAC CNMD -B: Performed by: ORTHOPAEDIC SURGERY

## 2021-01-01 PROCEDURE — 83036 HEMOGLOBIN GLYCOSYLATED A1C: CPT

## 2021-01-01 PROCEDURE — 77030003666 HC NDL SPINAL BD -A: Performed by: ORTHOPAEDIC SURGERY

## 2021-01-01 PROCEDURE — 73502 X-RAY EXAM HIP UNI 2-3 VIEWS: CPT

## 2021-01-01 PROCEDURE — 99214 OFFICE O/P EST MOD 30 MIN: CPT | Performed by: INTERNAL MEDICINE

## 2021-01-01 PROCEDURE — 97162 PT EVAL MOD COMPLEX 30 MIN: CPT

## 2021-01-01 PROCEDURE — 3336500001 HSPC ELECTION

## 2021-01-01 PROCEDURE — 74011000250 HC RX REV CODE- 250: Performed by: ORTHOPAEDIC SURGERY

## 2021-01-01 PROCEDURE — 2709999900 HC NON-CHARGEABLE SUPPLY: Performed by: ORTHOPAEDIC SURGERY

## 2021-01-01 PROCEDURE — 86592 SYPHILIS TEST NON-TREP QUAL: CPT

## 2021-01-01 PROCEDURE — 77030005513 HC CATH URETH FOL11 MDII -B

## 2021-01-01 PROCEDURE — 83540 ASSAY OF IRON: CPT

## 2021-01-01 DEVICE — 132 DEGREE NECK ANGLE HIP STEM
Type: IMPLANTABLE DEVICE | Site: HIP | Status: FUNCTIONAL
Brand: ACCOLADE

## 2021-01-01 DEVICE — BIPOLAR COMPONENT
Type: IMPLANTABLE DEVICE | Site: HIP | Status: FUNCTIONAL
Brand: UHR

## 2021-01-01 DEVICE — LFIT V40 FEMORAL HEAD
Type: IMPLANTABLE DEVICE | Site: HIP | Status: FUNCTIONAL
Brand: V40 HEAD

## 2021-01-01 RX ORDER — ERGOCALCIFEROL 1.25 MG/1
50000 CAPSULE ORAL
Status: DISCONTINUED | OUTPATIENT
Start: 2021-01-01 | End: 2021-01-01

## 2021-01-01 RX ORDER — FUROSEMIDE 40 MG/1
40 TABLET ORAL DAILY
Qty: 90 TAB | Refills: 1 | Status: SHIPPED | OUTPATIENT
Start: 2021-01-01 | End: 2021-01-01 | Stop reason: SDUPTHER

## 2021-01-01 RX ORDER — MAGNESIUM SULFATE 100 %
4 CRYSTALS MISCELLANEOUS AS NEEDED
Status: DISCONTINUED | OUTPATIENT
Start: 2021-01-01 | End: 2021-01-01 | Stop reason: HOSPADM

## 2021-01-01 RX ORDER — SODIUM CHLORIDE, SODIUM LACTATE, POTASSIUM CHLORIDE, CALCIUM CHLORIDE 600; 310; 30; 20 MG/100ML; MG/100ML; MG/100ML; MG/100ML
25 INJECTION, SOLUTION INTRAVENOUS CONTINUOUS
Status: DISCONTINUED | OUTPATIENT
Start: 2021-01-01 | End: 2021-01-01 | Stop reason: HOSPADM

## 2021-01-01 RX ORDER — ACETAMINOPHEN 325 MG/1
650 TABLET ORAL EVERY 6 HOURS
Status: DISCONTINUED | OUTPATIENT
Start: 2021-01-01 | End: 2021-01-01 | Stop reason: HOSPADM

## 2021-01-01 RX ORDER — ROPIVACAINE HYDROCHLORIDE 5 MG/ML
INJECTION, SOLUTION EPIDURAL; INFILTRATION; PERINEURAL AS NEEDED
Status: DISCONTINUED | OUTPATIENT
Start: 2021-01-01 | End: 2021-01-01 | Stop reason: HOSPADM

## 2021-01-01 RX ORDER — ONDANSETRON 2 MG/ML
4 INJECTION INTRAMUSCULAR; INTRAVENOUS
Status: DISCONTINUED | OUTPATIENT
Start: 2021-01-01 | End: 2021-01-01

## 2021-01-01 RX ORDER — CEFAZOLIN SODIUM 1 G/3ML
INJECTION, POWDER, FOR SOLUTION INTRAMUSCULAR; INTRAVENOUS AS NEEDED
Status: DISCONTINUED | OUTPATIENT
Start: 2021-01-01 | End: 2021-01-01 | Stop reason: HOSPADM

## 2021-01-01 RX ORDER — FUROSEMIDE 40 MG/1
40 TABLET ORAL DAILY
Qty: 90 TAB | Refills: 1 | Status: SHIPPED | OUTPATIENT
Start: 2021-01-01 | End: 2021-01-01

## 2021-01-01 RX ORDER — ONDANSETRON 4 MG/1
4 TABLET, ORALLY DISINTEGRATING ORAL
Status: DISCONTINUED | OUTPATIENT
Start: 2021-01-01 | End: 2021-01-01 | Stop reason: HOSPADM

## 2021-01-01 RX ORDER — POTASSIUM CHLORIDE 20 MEQ/1
40 TABLET, EXTENDED RELEASE ORAL ONCE
Status: COMPLETED | OUTPATIENT
Start: 2021-01-01 | End: 2021-01-01

## 2021-01-01 RX ORDER — SODIUM CHLORIDE, SODIUM LACTATE, POTASSIUM CHLORIDE, CALCIUM CHLORIDE 600; 310; 30; 20 MG/100ML; MG/100ML; MG/100ML; MG/100ML
INJECTION, SOLUTION INTRAVENOUS
Status: DISCONTINUED | OUTPATIENT
Start: 2021-01-01 | End: 2021-01-01 | Stop reason: HOSPADM

## 2021-01-01 RX ORDER — FERROUS SULFATE, DRIED 160(50) MG
1 TABLET, EXTENDED RELEASE ORAL
Status: DISCONTINUED | OUTPATIENT
Start: 2021-01-01 | End: 2021-01-01 | Stop reason: HOSPADM

## 2021-01-01 RX ORDER — ALBUMIN HUMAN 250 G/1000ML
12.5 SOLUTION INTRAVENOUS ONCE
Status: COMPLETED | OUTPATIENT
Start: 2021-01-01 | End: 2021-01-01

## 2021-01-01 RX ORDER — METOPROLOL TARTRATE 5 MG/5ML
2.5 INJECTION INTRAVENOUS ONCE
Status: COMPLETED | OUTPATIENT
Start: 2021-01-01 | End: 2021-01-01

## 2021-01-01 RX ORDER — POLYETHYLENE GLYCOL 3350 17 G/17G
17 POWDER, FOR SOLUTION ORAL DAILY PRN
Status: DISCONTINUED | OUTPATIENT
Start: 2021-01-01 | End: 2021-01-01 | Stop reason: HOSPADM

## 2021-01-01 RX ORDER — MIDODRINE HYDROCHLORIDE 5 MG/1
5 TABLET ORAL 3 TIMES DAILY
Status: DISCONTINUED | OUTPATIENT
Start: 2021-01-01 | End: 2021-01-01

## 2021-01-01 RX ORDER — CARVEDILOL 25 MG/1
37.5 TABLET ORAL 2 TIMES DAILY WITH MEALS
Qty: 90 TAB | Refills: 1 | Status: SHIPPED | OUTPATIENT
Start: 2021-01-01 | End: 2021-01-01

## 2021-01-01 RX ORDER — DEXTROSE 50 % IN WATER (D50W) INTRAVENOUS SYRINGE
12.5-25 AS NEEDED
Status: DISCONTINUED | OUTPATIENT
Start: 2021-01-01 | End: 2021-01-01

## 2021-01-01 RX ORDER — SODIUM CHLORIDE, SODIUM LACTATE, POTASSIUM CHLORIDE, CALCIUM CHLORIDE 600; 310; 30; 20 MG/100ML; MG/100ML; MG/100ML; MG/100ML
100 INJECTION, SOLUTION INTRAVENOUS CONTINUOUS
Status: DISCONTINUED | OUTPATIENT
Start: 2021-01-01 | End: 2021-01-01 | Stop reason: HOSPADM

## 2021-01-01 RX ORDER — CLINDAMYCIN PHOSPHATE 900 MG/50ML
900 INJECTION INTRAVENOUS EVERY 8 HOURS
Status: DISPENSED | OUTPATIENT
Start: 2021-01-01 | End: 2021-01-01

## 2021-01-01 RX ORDER — LEVOFLOXACIN 750 MG/1
750 TABLET ORAL DAILY
Qty: 5 TAB | Refills: 0 | Status: SHIPPED | OUTPATIENT
Start: 2021-01-01 | End: 2021-01-01

## 2021-01-01 RX ORDER — SODIUM CHLORIDE, SODIUM LACTATE, POTASSIUM CHLORIDE, CALCIUM CHLORIDE 600; 310; 30; 20 MG/100ML; MG/100ML; MG/100ML; MG/100ML
50 INJECTION, SOLUTION INTRAVENOUS CONTINUOUS
Status: DISCONTINUED | OUTPATIENT
Start: 2021-01-01 | End: 2021-01-01

## 2021-01-01 RX ORDER — LIDOCAINE HYDROCHLORIDE 10 MG/ML
0.1 INJECTION, SOLUTION EPIDURAL; INFILTRATION; INTRACAUDAL; PERINEURAL AS NEEDED
Status: DISCONTINUED | OUTPATIENT
Start: 2021-01-01 | End: 2021-01-01 | Stop reason: HOSPADM

## 2021-01-01 RX ORDER — FOLIC ACID 1 MG/1
1 TABLET ORAL DAILY
Status: DISCONTINUED | OUTPATIENT
Start: 2021-01-01 | End: 2021-01-01 | Stop reason: HOSPADM

## 2021-01-01 RX ORDER — AMOXICILLIN 250 MG
1 CAPSULE ORAL 2 TIMES DAILY
Status: DISCONTINUED | OUTPATIENT
Start: 2021-01-01 | End: 2021-01-01

## 2021-01-01 RX ORDER — BALSAM PERU/CASTOR OIL
OINTMENT (GRAM) TOPICAL 2 TIMES DAILY
Status: DISCONTINUED | OUTPATIENT
Start: 2021-01-01 | End: 2021-01-01 | Stop reason: HOSPADM

## 2021-01-01 RX ORDER — FERROUS SULFATE 137(45) MG
TABLET, EXTENDED RELEASE ORAL
COMMUNITY

## 2021-01-01 RX ORDER — FENTANYL CITRATE 50 UG/ML
INJECTION, SOLUTION INTRAMUSCULAR; INTRAVENOUS AS NEEDED
Status: DISCONTINUED | OUTPATIENT
Start: 2021-01-01 | End: 2021-01-01 | Stop reason: HOSPADM

## 2021-01-01 RX ORDER — ROCURONIUM BROMIDE 10 MG/ML
INJECTION, SOLUTION INTRAVENOUS AS NEEDED
Status: DISCONTINUED | OUTPATIENT
Start: 2021-01-01 | End: 2021-01-01 | Stop reason: HOSPADM

## 2021-01-01 RX ORDER — LANCETS
EACH MISCELLANEOUS
Qty: 100 EACH | Refills: 2 | Status: SHIPPED | OUTPATIENT
Start: 2021-01-01

## 2021-01-01 RX ORDER — DEXTROSE MONOHYDRATE AND SODIUM CHLORIDE 5; .45 G/100ML; G/100ML
75 INJECTION, SOLUTION INTRAVENOUS CONTINUOUS
Status: DISCONTINUED | OUTPATIENT
Start: 2021-01-01 | End: 2021-01-01

## 2021-01-01 RX ORDER — SODIUM CHLORIDE 0.9 % (FLUSH) 0.9 %
5-40 SYRINGE (ML) INJECTION EVERY 8 HOURS
Status: DISCONTINUED | OUTPATIENT
Start: 2021-01-01 | End: 2021-01-01

## 2021-01-01 RX ORDER — INSULIN LISPRO 100 [IU]/ML
INJECTION, SOLUTION INTRAVENOUS; SUBCUTANEOUS
Status: DISCONTINUED | OUTPATIENT
Start: 2021-01-01 | End: 2021-01-01 | Stop reason: HOSPADM

## 2021-01-01 RX ORDER — ONDANSETRON 2 MG/ML
4 INJECTION INTRAMUSCULAR; INTRAVENOUS AS NEEDED
Status: DISCONTINUED | OUTPATIENT
Start: 2021-01-01 | End: 2021-01-01 | Stop reason: HOSPADM

## 2021-01-01 RX ORDER — SODIUM CHLORIDE 450 MG/100ML
50 INJECTION, SOLUTION INTRAVENOUS CONTINUOUS
Status: DISCONTINUED | OUTPATIENT
Start: 2021-01-01 | End: 2021-01-01

## 2021-01-01 RX ORDER — METFORMIN HYDROCHLORIDE 1000 MG/1
TABLET ORAL
Qty: 180 TAB | Refills: 1 | Status: CANCELLED | OUTPATIENT
Start: 2021-01-01

## 2021-01-01 RX ORDER — ALBUMIN HUMAN 250 G/1000ML
12.5 SOLUTION INTRAVENOUS EVERY 12 HOURS
Status: COMPLETED | OUTPATIENT
Start: 2021-01-01 | End: 2021-01-01

## 2021-01-01 RX ORDER — SODIUM CHLORIDE 0.9 % (FLUSH) 0.9 %
5-40 SYRINGE (ML) INJECTION AS NEEDED
Status: DISCONTINUED | OUTPATIENT
Start: 2021-01-01 | End: 2021-01-01 | Stop reason: HOSPADM

## 2021-01-01 RX ORDER — SODIUM CHLORIDE 0.9 % (FLUSH) 0.9 %
5-40 SYRINGE (ML) INJECTION EVERY 8 HOURS
Status: DISCONTINUED | OUTPATIENT
Start: 2021-01-01 | End: 2021-01-01 | Stop reason: HOSPADM

## 2021-01-01 RX ORDER — ACETAMINOPHEN 650 MG/1
650 SUPPOSITORY RECTAL
Status: DISCONTINUED | OUTPATIENT
Start: 2021-01-01 | End: 2021-01-01 | Stop reason: HOSPADM

## 2021-01-01 RX ORDER — DEXTROSE MONOHYDRATE AND SODIUM CHLORIDE 5; .45 G/100ML; G/100ML
250 INJECTION, SOLUTION INTRAVENOUS ONCE
Status: COMPLETED | OUTPATIENT
Start: 2021-01-01 | End: 2021-01-01

## 2021-01-01 RX ORDER — CARVEDILOL 12.5 MG/1
25 TABLET ORAL 2 TIMES DAILY WITH MEALS
Status: DISCONTINUED | OUTPATIENT
Start: 2021-01-01 | End: 2021-01-01

## 2021-01-01 RX ORDER — SODIUM CHLORIDE 0.9 % (FLUSH) 0.9 %
5-40 SYRINGE (ML) INJECTION AS NEEDED
Status: DISCONTINUED | OUTPATIENT
Start: 2021-01-01 | End: 2021-01-01

## 2021-01-01 RX ORDER — ASPIRIN 81 MG/1
81 TABLET ORAL DAILY
Status: DISCONTINUED | OUTPATIENT
Start: 2021-01-01 | End: 2021-01-01 | Stop reason: HOSPADM

## 2021-01-01 RX ORDER — CLINDAMYCIN HYDROCHLORIDE 300 MG/1
300 CAPSULE ORAL 3 TIMES DAILY
Qty: 42 CAP | Refills: 0 | Status: SHIPPED | OUTPATIENT
Start: 2021-01-01 | End: 2021-01-01

## 2021-01-01 RX ORDER — ATORVASTATIN CALCIUM 10 MG/1
10 TABLET, FILM COATED ORAL
Status: DISCONTINUED | OUTPATIENT
Start: 2021-01-01 | End: 2021-01-01 | Stop reason: HOSPADM

## 2021-01-01 RX ORDER — METOPROLOL TARTRATE 5 MG/5ML
2.5 INJECTION INTRAVENOUS EVERY 6 HOURS
Status: DISCONTINUED | OUTPATIENT
Start: 2021-01-01 | End: 2021-01-01

## 2021-01-01 RX ORDER — CEPHALEXIN 250 MG/5ML
250 POWDER, FOR SUSPENSION ORAL 4 TIMES DAILY
Qty: 280 ML | Refills: 0 | Status: SHIPPED | OUTPATIENT
Start: 2021-01-01 | End: 2021-01-01 | Stop reason: SDUPTHER

## 2021-01-01 RX ORDER — VANCOMYCIN 2 GRAM/500 ML IN 0.9 % SODIUM CHLORIDE INTRAVENOUS
2000
Status: DISCONTINUED | OUTPATIENT
Start: 2021-01-01 | End: 2021-01-01

## 2021-01-01 RX ORDER — HEPARIN SODIUM 5000 [USP'U]/ML
5000 INJECTION, SOLUTION INTRAVENOUS; SUBCUTANEOUS EVERY 12 HOURS
Status: DISCONTINUED | OUTPATIENT
Start: 2021-01-01 | End: 2021-01-01 | Stop reason: HOSPADM

## 2021-01-01 RX ORDER — LANOLIN ALCOHOL/MO/W.PET/CERES
1000 CREAM (GRAM) TOPICAL DAILY
Qty: 90 TAB | Refills: 1 | Status: SHIPPED | OUTPATIENT
Start: 2021-01-01

## 2021-01-01 RX ORDER — MICONAZOLE NITRATE 20 MG/G
CREAM TOPICAL 2 TIMES DAILY
Status: DISCONTINUED | OUTPATIENT
Start: 2021-01-01 | End: 2021-01-01 | Stop reason: HOSPADM

## 2021-01-01 RX ORDER — FENTANYL CITRATE 50 UG/ML
25 INJECTION, SOLUTION INTRAMUSCULAR; INTRAVENOUS
Status: DISCONTINUED | OUTPATIENT
Start: 2021-01-01 | End: 2021-01-01 | Stop reason: HOSPADM

## 2021-01-01 RX ORDER — DEXTROSE MONOHYDRATE 50 MG/ML
75 INJECTION, SOLUTION INTRAVENOUS CONTINUOUS
Status: DISCONTINUED | OUTPATIENT
Start: 2021-01-01 | End: 2021-01-01

## 2021-01-01 RX ORDER — MORPHINE SULFATE 10 MG/ML
2 INJECTION, SOLUTION INTRAMUSCULAR; INTRAVENOUS
Status: DISCONTINUED | OUTPATIENT
Start: 2021-01-01 | End: 2021-01-01 | Stop reason: HOSPADM

## 2021-01-01 RX ORDER — OXYCODONE HYDROCHLORIDE 5 MG/1
5 TABLET ORAL
Status: DISCONTINUED | OUTPATIENT
Start: 2021-01-01 | End: 2021-01-01

## 2021-01-01 RX ORDER — FACIAL-BODY WIPES
10 EACH TOPICAL DAILY PRN
Status: DISCONTINUED | OUTPATIENT
Start: 2021-01-01 | End: 2021-01-01

## 2021-01-01 RX ORDER — ALBUMIN HUMAN 50 G/1000ML
12.5 SOLUTION INTRAVENOUS ONCE
Status: COMPLETED | OUTPATIENT
Start: 2021-01-01 | End: 2021-01-01

## 2021-01-01 RX ORDER — CARVEDILOL 12.5 MG/1
12.5 TABLET ORAL 2 TIMES DAILY WITH MEALS
Status: DISCONTINUED | OUTPATIENT
Start: 2021-01-01 | End: 2021-01-01

## 2021-01-01 RX ORDER — ENOXAPARIN SODIUM 100 MG/ML
30 INJECTION SUBCUTANEOUS DAILY
Status: DISCONTINUED | OUTPATIENT
Start: 2021-01-01 | End: 2021-01-01

## 2021-01-01 RX ORDER — DEXTROMETHORPHAN HYDROBROMIDE, GUAIFENESIN 5; 100 MG/5ML; MG/5ML
650 LIQUID ORAL EVERY 8 HOURS
COMMUNITY

## 2021-01-01 RX ORDER — DEXAMETHASONE SODIUM PHOSPHATE 4 MG/ML
INJECTION, SOLUTION INTRA-ARTICULAR; INTRALESIONAL; INTRAMUSCULAR; INTRAVENOUS; SOFT TISSUE AS NEEDED
Status: DISCONTINUED | OUTPATIENT
Start: 2021-01-01 | End: 2021-01-01 | Stop reason: HOSPADM

## 2021-01-01 RX ORDER — PROPOFOL 10 MG/ML
INJECTION, EMULSION INTRAVENOUS AS NEEDED
Status: DISCONTINUED | OUTPATIENT
Start: 2021-01-01 | End: 2021-01-01 | Stop reason: HOSPADM

## 2021-01-01 RX ORDER — CEPHALEXIN 250 MG/5ML
250 POWDER, FOR SUSPENSION ORAL 4 TIMES DAILY
Qty: 280 ML | Refills: 0 | Status: SHIPPED | OUTPATIENT
Start: 2021-01-01 | End: 2021-01-01

## 2021-01-01 RX ORDER — POTASSIUM CHLORIDE 7.45 MG/ML
10 INJECTION INTRAVENOUS
Status: DISPENSED | OUTPATIENT
Start: 2021-01-01 | End: 2021-01-01

## 2021-01-01 RX ORDER — BISMUTH SUBSALICYLATE 262 MG
1 TABLET,CHEWABLE ORAL DAILY
COMMUNITY

## 2021-01-01 RX ORDER — WATER FOR INJECTION,STERILE
VIAL (ML) INJECTION
Status: DISCONTINUED
Start: 2021-01-01 | End: 2021-01-01

## 2021-01-01 RX ORDER — ACETAMINOPHEN 325 MG/1
650 TABLET ORAL
Status: DISCONTINUED | OUTPATIENT
Start: 2021-01-01 | End: 2021-01-01 | Stop reason: HOSPADM

## 2021-01-01 RX ORDER — DEXTROSE 50 % IN WATER (D50W) INTRAVENOUS SYRINGE
25-50 AS NEEDED
Status: DISCONTINUED | OUTPATIENT
Start: 2021-01-01 | End: 2021-01-01 | Stop reason: HOSPADM

## 2021-01-01 RX ORDER — FLUCONAZOLE 2 MG/ML
200 INJECTION, SOLUTION INTRAVENOUS EVERY 24 HOURS
Status: COMPLETED | OUTPATIENT
Start: 2021-01-01 | End: 2021-01-01

## 2021-01-01 RX ORDER — HEPARIN SODIUM 5000 [USP'U]/ML
5000 INJECTION, SOLUTION INTRAVENOUS; SUBCUTANEOUS EVERY 12 HOURS
Status: DISCONTINUED | OUTPATIENT
Start: 2021-01-01 | End: 2021-01-01

## 2021-01-01 RX ORDER — CARVEDILOL 3.12 MG/1
3.12 TABLET ORAL 2 TIMES DAILY WITH MEALS
Status: DISCONTINUED | OUTPATIENT
Start: 2021-01-01 | End: 2021-01-01 | Stop reason: HOSPADM

## 2021-01-01 RX ORDER — LIDOCAINE HYDROCHLORIDE 20 MG/ML
INJECTION, SOLUTION EPIDURAL; INFILTRATION; INTRACAUDAL; PERINEURAL AS NEEDED
Status: DISCONTINUED | OUTPATIENT
Start: 2021-01-01 | End: 2021-01-01 | Stop reason: HOSPADM

## 2021-01-01 RX ORDER — NALOXONE HYDROCHLORIDE 0.4 MG/ML
0.4 INJECTION, SOLUTION INTRAMUSCULAR; INTRAVENOUS; SUBCUTANEOUS AS NEEDED
Status: DISCONTINUED | OUTPATIENT
Start: 2021-01-01 | End: 2021-01-01

## 2021-01-01 RX ORDER — PHENTOLAMINE MESYLATE 5 MG/1
10 INJECTION INTRAMUSCULAR; INTRAVENOUS ONCE
Status: COMPLETED | OUTPATIENT
Start: 2021-01-01 | End: 2021-01-01

## 2021-01-01 RX ORDER — POLYETHYLENE GLYCOL 3350 17 G/17G
17 POWDER, FOR SOLUTION ORAL DAILY PRN
Status: DISCONTINUED | OUTPATIENT
Start: 2021-01-01 | End: 2021-01-01

## 2021-01-01 RX ORDER — ENOXAPARIN SODIUM 100 MG/ML
40 INJECTION SUBCUTANEOUS DAILY
Status: DISCONTINUED | OUTPATIENT
Start: 2021-01-01 | End: 2021-01-01 | Stop reason: CLARIF

## 2021-01-01 RX ORDER — LANOLIN ALCOHOL/MO/W.PET/CERES
1000 CREAM (GRAM) TOPICAL DAILY
COMMUNITY
End: 2021-01-01 | Stop reason: SDUPTHER

## 2021-01-01 RX ORDER — HYDROMORPHONE HYDROCHLORIDE 1 MG/ML
0.2 INJECTION, SOLUTION INTRAMUSCULAR; INTRAVENOUS; SUBCUTANEOUS
Status: DISCONTINUED | OUTPATIENT
Start: 2021-01-01 | End: 2021-01-01

## 2021-01-01 RX ORDER — PROMETHAZINE HYDROCHLORIDE 25 MG/1
12.5 TABLET ORAL
Status: DISCONTINUED | OUTPATIENT
Start: 2021-01-01 | End: 2021-01-01

## 2021-01-01 RX ORDER — SUCCINYLCHOLINE CHLORIDE 20 MG/ML
INJECTION INTRAMUSCULAR; INTRAVENOUS AS NEEDED
Status: DISCONTINUED | OUTPATIENT
Start: 2021-01-01 | End: 2021-01-01 | Stop reason: HOSPADM

## 2021-01-01 RX ORDER — ONDANSETRON 2 MG/ML
INJECTION INTRAMUSCULAR; INTRAVENOUS AS NEEDED
Status: DISCONTINUED | OUTPATIENT
Start: 2021-01-01 | End: 2021-01-01 | Stop reason: HOSPADM

## 2021-01-01 RX ORDER — POTASSIUM CHLORIDE 7.45 MG/ML
10 INJECTION INTRAVENOUS
Status: COMPLETED | OUTPATIENT
Start: 2021-01-01 | End: 2021-01-01

## 2021-01-01 RX ORDER — OXYCODONE HYDROCHLORIDE 5 MG/1
2.5 TABLET ORAL
Status: DISCONTINUED | OUTPATIENT
Start: 2021-01-01 | End: 2021-01-01

## 2021-01-01 RX ORDER — SODIUM CHLORIDE 9 MG/ML
125 INJECTION, SOLUTION INTRAVENOUS CONTINUOUS
Status: DISCONTINUED | OUTPATIENT
Start: 2021-01-01 | End: 2021-01-01

## 2021-01-01 RX ORDER — MAGNESIUM SULFATE HEPTAHYDRATE 40 MG/ML
2 INJECTION, SOLUTION INTRAVENOUS ONCE
Status: COMPLETED | OUTPATIENT
Start: 2021-01-01 | End: 2021-01-01

## 2021-01-01 RX ORDER — CEFAZOLIN SODIUM 1 G/3ML
INJECTION, POWDER, FOR SOLUTION INTRAMUSCULAR; INTRAVENOUS
Status: COMPLETED
Start: 2021-01-01 | End: 2021-01-01

## 2021-01-01 RX ORDER — DIPHENHYDRAMINE HYDROCHLORIDE 50 MG/ML
12.5 INJECTION, SOLUTION INTRAMUSCULAR; INTRAVENOUS
Status: DISCONTINUED | OUTPATIENT
Start: 2021-01-01 | End: 2021-01-01 | Stop reason: HOSPADM

## 2021-01-01 RX ORDER — ONDANSETRON 2 MG/ML
4 INJECTION INTRAMUSCULAR; INTRAVENOUS
Status: ACTIVE | OUTPATIENT
Start: 2021-01-01 | End: 2021-01-01

## 2021-01-01 RX ORDER — POLYETHYLENE GLYCOL 3350 17 G/17G
17 POWDER, FOR SOLUTION ORAL DAILY
Status: DISCONTINUED | OUTPATIENT
Start: 2021-01-01 | End: 2021-01-01

## 2021-01-01 RX ORDER — CLINDAMYCIN HYDROCHLORIDE 300 MG/1
300 CAPSULE ORAL 3 TIMES DAILY
Qty: 42 CAP | Refills: 0 | Status: SHIPPED | OUTPATIENT
Start: 2021-01-01 | End: 2021-01-01 | Stop reason: SDUPTHER

## 2021-01-01 RX ORDER — EPHEDRINE SULFATE/0.9% NACL/PF 50 MG/5 ML
SYRINGE (ML) INTRAVENOUS AS NEEDED
Status: DISCONTINUED | OUTPATIENT
Start: 2021-01-01 | End: 2021-01-01 | Stop reason: HOSPADM

## 2021-01-01 RX ORDER — ASPIRIN 325 MG/1
200 TABLET, FILM COATED ORAL DAILY
Status: DISCONTINUED | OUTPATIENT
Start: 2021-01-01 | End: 2021-01-01 | Stop reason: HOSPADM

## 2021-01-01 RX ORDER — NYSTATIN 100000 [USP'U]/G
POWDER TOPICAL 2 TIMES DAILY
Status: DISCONTINUED | OUTPATIENT
Start: 2021-01-01 | End: 2021-01-01 | Stop reason: HOSPADM

## 2021-01-01 RX ADMIN — Medication: at 08:48

## 2021-01-01 RX ADMIN — Medication: at 17:26

## 2021-01-01 RX ADMIN — Medication 10 ML: at 02:11

## 2021-01-01 RX ADMIN — METOPROLOL TARTRATE 2.5 MG: 5 INJECTION INTRAVENOUS at 17:53

## 2021-01-01 RX ADMIN — SODIUM CHLORIDE 10 ML: 9 INJECTION, SOLUTION INTRAMUSCULAR; INTRAVENOUS; SUBCUTANEOUS at 21:18

## 2021-01-01 RX ADMIN — ACETAMINOPHEN 650 MG: 325 TABLET ORAL at 17:24

## 2021-01-01 RX ADMIN — NYSTATIN: 100000 POWDER TOPICAL at 08:48

## 2021-01-01 RX ADMIN — HEPARIN SODIUM 5000 UNITS: 5000 INJECTION INTRAVENOUS; SUBCUTANEOUS at 21:18

## 2021-01-01 RX ADMIN — Medication 10 ML: at 06:00

## 2021-01-01 RX ADMIN — NYSTATIN: 100000 POWDER TOPICAL at 08:22

## 2021-01-01 RX ADMIN — PHENYLEPHRINE HYDROCHLORIDE 40 MCG/MIN: 10 INJECTION INTRAVENOUS at 09:39

## 2021-01-01 RX ADMIN — THIAMINE HYDROCHLORIDE 200 MG: 100 INJECTION, SOLUTION INTRAMUSCULAR; INTRAVENOUS at 11:55

## 2021-01-01 RX ADMIN — Medication 10 MG: at 12:35

## 2021-01-01 RX ADMIN — Medication 1 TABLET: at 18:01

## 2021-01-01 RX ADMIN — CLINDAMYCIN IN 5 PERCENT DEXTROSE 900 MG: 18 INJECTION, SOLUTION INTRAVENOUS at 02:38

## 2021-01-01 RX ADMIN — CLINDAMYCIN IN 5 PERCENT DEXTROSE 900 MG: 18 INJECTION, SOLUTION INTRAVENOUS at 03:19

## 2021-01-01 RX ADMIN — CARVEDILOL 3.12 MG: 3.12 TABLET, FILM COATED ORAL at 08:22

## 2021-01-01 RX ADMIN — MULTIPLE VITAMINS W/ MINERALS TAB 1 TABLET: TAB at 08:22

## 2021-01-01 RX ADMIN — MICONAZOLE NITRATE: 20 CREAM TOPICAL at 08:22

## 2021-01-01 RX ADMIN — CLINDAMYCIN IN 5 PERCENT DEXTROSE 900 MG: 18 INJECTION, SOLUTION INTRAVENOUS at 10:28

## 2021-01-01 RX ADMIN — ASPIRIN 81 MG: 81 TABLET, COATED ORAL at 08:47

## 2021-01-01 RX ADMIN — MICONAZOLE NITRATE: 20 CREAM TOPICAL at 17:25

## 2021-01-01 RX ADMIN — SODIUM CHLORIDE 1000 ML: 9 INJECTION, SOLUTION INTRAVENOUS at 22:08

## 2021-01-01 RX ADMIN — COLLAGENASE SANTYL: 250 OINTMENT TOPICAL at 12:00

## 2021-01-01 RX ADMIN — Medication: at 18:03

## 2021-01-01 RX ADMIN — SODIUM CHLORIDE 75 ML/HR: 450 INJECTION, SOLUTION INTRAVENOUS at 23:56

## 2021-01-01 RX ADMIN — SODIUM CHLORIDE 10 ML: 9 INJECTION, SOLUTION INTRAMUSCULAR; INTRAVENOUS; SUBCUTANEOUS at 21:56

## 2021-01-01 RX ADMIN — POTASSIUM CHLORIDE 10 MEQ: 10 INJECTION, SOLUTION INTRAVENOUS at 14:58

## 2021-01-01 RX ADMIN — FLUCONAZOLE 200 MG: 2 INJECTION, SOLUTION INTRAVENOUS at 01:16

## 2021-01-01 RX ADMIN — CLINDAMYCIN IN 5 PERCENT DEXTROSE 900 MG: 18 INJECTION, SOLUTION INTRAVENOUS at 02:34

## 2021-01-01 RX ADMIN — THIAMINE HYDROCHLORIDE 200 MG: 100 INJECTION, SOLUTION INTRAMUSCULAR; INTRAVENOUS at 10:02

## 2021-01-01 RX ADMIN — NYSTATIN: 100000 POWDER TOPICAL at 09:25

## 2021-01-01 RX ADMIN — CARVEDILOL 3.12 MG: 3.12 TABLET, FILM COATED ORAL at 18:01

## 2021-01-01 RX ADMIN — Medication: at 10:30

## 2021-01-01 RX ADMIN — CEFTRIAXONE 1 G: 1 INJECTION, POWDER, FOR SOLUTION INTRAMUSCULAR; INTRAVENOUS at 00:42

## 2021-01-01 RX ADMIN — PHENTOLAMINE MESYLATE 10 MG: 5 INJECTION, POWDER, FOR SOLUTION INTRAMUSCULAR; INTRAVENOUS at 12:52

## 2021-01-01 RX ADMIN — CEFEPIME HYDROCHLORIDE 1 G: 1 INJECTION, POWDER, FOR SOLUTION INTRAMUSCULAR; INTRAVENOUS at 04:37

## 2021-01-01 RX ADMIN — CEFTRIAXONE 1 G: 1 INJECTION, POWDER, FOR SOLUTION INTRAMUSCULAR; INTRAVENOUS at 00:28

## 2021-01-01 RX ADMIN — NYSTATIN: 100000 POWDER TOPICAL at 18:03

## 2021-01-01 RX ADMIN — COLLAGENASE SANTYL: 250 OINTMENT TOPICAL at 09:00

## 2021-01-01 RX ADMIN — SODIUM CHLORIDE 1000 ML: 9 INJECTION, SOLUTION INTRAVENOUS at 23:58

## 2021-01-01 RX ADMIN — ACETAMINOPHEN 650 MG: 325 TABLET ORAL at 08:47

## 2021-01-01 RX ADMIN — ACETAMINOPHEN 650 MG: 325 TABLET ORAL at 05:59

## 2021-01-01 RX ADMIN — ALBUMIN (HUMAN) 12.5 G: 0.25 INJECTION, SOLUTION INTRAVENOUS at 20:46

## 2021-01-01 RX ADMIN — ASPIRIN 81 MG: 81 TABLET, COATED ORAL at 08:22

## 2021-01-01 RX ADMIN — Medication 10 ML: at 22:00

## 2021-01-01 RX ADMIN — INSULIN LISPRO 3 UNITS: 100 INJECTION, SOLUTION INTRAVENOUS; SUBCUTANEOUS at 17:23

## 2021-01-01 RX ADMIN — PHENYLEPHRINE HYDROCHLORIDE 30 MCG/MIN: 10 INJECTION INTRAVENOUS at 20:03

## 2021-01-01 RX ADMIN — HEPARIN SODIUM 5000 UNITS: 5000 INJECTION INTRAVENOUS; SUBCUTANEOUS at 08:42

## 2021-01-01 RX ADMIN — PROPOFOL 100 MG: 10 INJECTION, EMULSION INTRAVENOUS at 12:33

## 2021-01-01 RX ADMIN — PIPERACILLIN AND TAZOBACTAM 3.38 G: 3; .375 INJECTION, POWDER, LYOPHILIZED, FOR SOLUTION INTRAVENOUS at 00:38

## 2021-01-01 RX ADMIN — HEPARIN SODIUM 5000 UNITS: 5000 INJECTION INTRAVENOUS; SUBCUTANEOUS at 08:56

## 2021-01-01 RX ADMIN — MICONAZOLE NITRATE: 20 CREAM TOPICAL at 18:13

## 2021-01-01 RX ADMIN — CEFTRIAXONE 1 G: 1 INJECTION, POWDER, FOR SOLUTION INTRAMUSCULAR; INTRAVENOUS at 00:20

## 2021-01-01 RX ADMIN — Medication 10 ML: at 14:00

## 2021-01-01 RX ADMIN — CLINDAMYCIN IN 5 PERCENT DEXTROSE 900 MG: 18 INJECTION, SOLUTION INTRAVENOUS at 03:15

## 2021-01-01 RX ADMIN — MICONAZOLE NITRATE: 20 CREAM TOPICAL at 10:30

## 2021-01-01 RX ADMIN — HEPARIN SODIUM 5000 UNITS: 5000 INJECTION INTRAVENOUS; SUBCUTANEOUS at 21:57

## 2021-01-01 RX ADMIN — CEFTRIAXONE 1 G: 1 INJECTION, POWDER, FOR SOLUTION INTRAMUSCULAR; INTRAVENOUS at 00:08

## 2021-01-01 RX ADMIN — NYSTATIN: 100000 POWDER TOPICAL at 08:49

## 2021-01-01 RX ADMIN — CLINDAMYCIN IN 5 PERCENT DEXTROSE 900 MG: 18 INJECTION, SOLUTION INTRAVENOUS at 18:45

## 2021-01-01 RX ADMIN — MICONAZOLE NITRATE: 20 CREAM TOPICAL at 08:49

## 2021-01-01 RX ADMIN — CLINDAMYCIN IN 5 PERCENT DEXTROSE 900 MG: 18 INJECTION, SOLUTION INTRAVENOUS at 10:25

## 2021-01-01 RX ADMIN — SODIUM CHLORIDE, POTASSIUM CHLORIDE, SODIUM LACTATE AND CALCIUM CHLORIDE: 600; 310; 30; 20 INJECTION, SOLUTION INTRAVENOUS at 12:26

## 2021-01-01 RX ADMIN — MICONAZOLE NITRATE: 20 CREAM TOPICAL at 10:11

## 2021-01-01 RX ADMIN — SODIUM CHLORIDE 40 MCG/MIN: 900 INJECTION, SOLUTION INTRAVENOUS at 12:33

## 2021-01-01 RX ADMIN — ASPIRIN 81 MG: 81 TABLET, COATED ORAL at 10:16

## 2021-01-01 RX ADMIN — SODIUM CHLORIDE, POTASSIUM CHLORIDE, SODIUM LACTATE AND CALCIUM CHLORIDE 500 ML: 600; 310; 30; 20 INJECTION, SOLUTION INTRAVENOUS at 10:42

## 2021-01-01 RX ADMIN — SODIUM CHLORIDE 10 ML: 9 INJECTION, SOLUTION INTRAMUSCULAR; INTRAVENOUS; SUBCUTANEOUS at 16:39

## 2021-01-01 RX ADMIN — NYSTATIN: 100000 POWDER TOPICAL at 18:13

## 2021-01-01 RX ADMIN — DEXAMETHASONE SODIUM PHOSPHATE 8 MG: 4 INJECTION, SOLUTION INTRAMUSCULAR; INTRAVENOUS at 12:40

## 2021-01-01 RX ADMIN — SODIUM CHLORIDE 125 ML/HR: 9 INJECTION, SOLUTION INTRAVENOUS at 14:54

## 2021-01-01 RX ADMIN — POTASSIUM CHLORIDE 40 MEQ: 20 TABLET, EXTENDED RELEASE ORAL at 08:23

## 2021-01-01 RX ADMIN — CLINDAMYCIN IN 5 PERCENT DEXTROSE 900 MG: 18 INJECTION, SOLUTION INTRAVENOUS at 09:18

## 2021-01-01 RX ADMIN — CEFTRIAXONE 1 G: 1 INJECTION, POWDER, FOR SOLUTION INTRAMUSCULAR; INTRAVENOUS at 23:25

## 2021-01-01 RX ADMIN — Medication: at 08:32

## 2021-01-01 RX ADMIN — SODIUM CHLORIDE 10 ML: 9 INJECTION, SOLUTION INTRAMUSCULAR; INTRAVENOUS; SUBCUTANEOUS at 06:00

## 2021-01-01 RX ADMIN — INSULIN LISPRO 2 UNITS: 100 INJECTION, SOLUTION INTRAVENOUS; SUBCUTANEOUS at 17:18

## 2021-01-01 RX ADMIN — Medication 10 ML: at 21:23

## 2021-01-01 RX ADMIN — Medication: at 08:22

## 2021-01-01 RX ADMIN — MICONAZOLE NITRATE: 20 CREAM TOPICAL at 08:21

## 2021-01-01 RX ADMIN — HEPARIN SODIUM 5000 UNITS: 5000 INJECTION INTRAVENOUS; SUBCUTANEOUS at 21:50

## 2021-01-01 RX ADMIN — SODIUM CHLORIDE 500 ML: 900 INJECTION, SOLUTION INTRAVENOUS at 20:32

## 2021-01-01 RX ADMIN — Medication 10 ML: at 05:19

## 2021-01-01 RX ADMIN — SUCCINYLCHOLINE CHLORIDE 100 MG: 20 INJECTION, SOLUTION INTRAMUSCULAR; INTRAVENOUS at 12:33

## 2021-01-01 RX ADMIN — Medication 1 TABLET: at 08:47

## 2021-01-01 RX ADMIN — POTASSIUM CHLORIDE 10 MEQ: 10 INJECTION, SOLUTION INTRAVENOUS at 10:26

## 2021-01-01 RX ADMIN — ALBUMIN (HUMAN) 12.5 G: 0.25 INJECTION, SOLUTION INTRAVENOUS at 16:25

## 2021-01-01 RX ADMIN — Medication 1 TABLET: at 16:25

## 2021-01-01 RX ADMIN — Medication: at 17:25

## 2021-01-01 RX ADMIN — ACETAMINOPHEN 650 MG: 325 TABLET ORAL at 12:12

## 2021-01-01 RX ADMIN — ACETAMINOPHEN 650 MG: 325 TABLET ORAL at 18:01

## 2021-01-01 RX ADMIN — MICONAZOLE NITRATE: 20 CREAM TOPICAL at 08:59

## 2021-01-01 RX ADMIN — SODIUM CHLORIDE 10 ML: 9 INJECTION, SOLUTION INTRAMUSCULAR; INTRAVENOUS; SUBCUTANEOUS at 05:14

## 2021-01-01 RX ADMIN — PIPERACILLIN AND TAZOBACTAM 3.38 G: 3; .375 INJECTION, POWDER, LYOPHILIZED, FOR SOLUTION INTRAVENOUS at 20:29

## 2021-01-01 RX ADMIN — FLUCONAZOLE 200 MG: 2 INJECTION, SOLUTION INTRAVENOUS at 02:11

## 2021-01-01 RX ADMIN — ASPIRIN 81 MG: 81 TABLET, COATED ORAL at 09:39

## 2021-01-01 RX ADMIN — INSULIN LISPRO 2 UNITS: 100 INJECTION, SOLUTION INTRAVENOUS; SUBCUTANEOUS at 08:57

## 2021-01-01 RX ADMIN — PIPERACILLIN AND TAZOBACTAM 3.38 G: 3; .375 INJECTION, POWDER, LYOPHILIZED, FOR SOLUTION INTRAVENOUS at 15:17

## 2021-01-01 RX ADMIN — SODIUM CHLORIDE: 234 INJECTION, SOLUTION INTRAVENOUS at 10:15

## 2021-01-01 RX ADMIN — Medication 10 ML: at 06:01

## 2021-01-01 RX ADMIN — FENTANYL CITRATE 25 MCG: 50 INJECTION, SOLUTION INTRAMUSCULAR; INTRAVENOUS at 13:07

## 2021-01-01 RX ADMIN — MORPHINE SULFATE 5 MG: 20 SOLUTION ORAL at 12:30

## 2021-01-01 RX ADMIN — CEFTRIAXONE 1 G: 1 INJECTION, POWDER, FOR SOLUTION INTRAMUSCULAR; INTRAVENOUS at 00:37

## 2021-01-01 RX ADMIN — HYDROMORPHONE HYDROCHLORIDE 0.2 MG: 1 INJECTION, SOLUTION INTRAMUSCULAR; INTRAVENOUS; SUBCUTANEOUS at 14:33

## 2021-01-01 RX ADMIN — MICONAZOLE NITRATE: 20 CREAM TOPICAL at 17:26

## 2021-01-01 RX ADMIN — CEFTRIAXONE 1 G: 1 INJECTION, POWDER, FOR SOLUTION INTRAMUSCULAR; INTRAVENOUS at 00:59

## 2021-01-01 RX ADMIN — Medication 1 TABLET: at 08:56

## 2021-01-01 RX ADMIN — Medication 10 ML: at 06:44

## 2021-01-01 RX ADMIN — CEFEPIME HYDROCHLORIDE 1 G: 1 INJECTION, POWDER, FOR SOLUTION INTRAMUSCULAR; INTRAVENOUS at 18:15

## 2021-01-01 RX ADMIN — WATER 2 G: 1 INJECTION INTRAMUSCULAR; INTRAVENOUS; SUBCUTANEOUS at 04:39

## 2021-01-01 RX ADMIN — THIAMINE HYDROCHLORIDE 200 MG: 100 INJECTION, SOLUTION INTRAMUSCULAR; INTRAVENOUS at 14:41

## 2021-01-01 RX ADMIN — MICONAZOLE NITRATE: 20 CREAM TOPICAL at 08:33

## 2021-01-01 RX ADMIN — THIAMINE HYDROCHLORIDE 200 MG: 100 INJECTION, SOLUTION INTRAMUSCULAR; INTRAVENOUS at 14:16

## 2021-01-01 RX ADMIN — POTASSIUM PHOSPHATE, MONOBASIC POTASSIUM PHOSPHATE, DIBASIC: 224; 236 INJECTION, SOLUTION, CONCENTRATE INTRAVENOUS at 13:42

## 2021-01-01 RX ADMIN — POTASSIUM CHLORIDE 10 MEQ: 10 INJECTION, SOLUTION INTRAVENOUS at 10:15

## 2021-01-01 RX ADMIN — ACETAMINOPHEN 650 MG: 325 TABLET ORAL at 23:42

## 2021-01-01 RX ADMIN — Medication: at 08:58

## 2021-01-01 RX ADMIN — INSULIN LISPRO 2 UNITS: 100 INJECTION, SOLUTION INTRAVENOUS; SUBCUTANEOUS at 08:12

## 2021-01-01 RX ADMIN — CEFTRIAXONE 1 G: 1 INJECTION, POWDER, FOR SOLUTION INTRAMUSCULAR; INTRAVENOUS at 23:46

## 2021-01-01 RX ADMIN — SODIUM CHLORIDE 75 ML/HR: 450 INJECTION, SOLUTION INTRAVENOUS at 10:32

## 2021-01-01 RX ADMIN — HEPARIN SODIUM 5000 UNITS: 5000 INJECTION INTRAVENOUS; SUBCUTANEOUS at 21:49

## 2021-01-01 RX ADMIN — SODIUM CHLORIDE: 234 INJECTION, SOLUTION INTRAVENOUS at 05:04

## 2021-01-01 RX ADMIN — Medication 1 TABLET: at 13:37

## 2021-01-01 RX ADMIN — FLUCONAZOLE 200 MG: 2 INJECTION, SOLUTION INTRAVENOUS at 01:44

## 2021-01-01 RX ADMIN — Medication: at 18:13

## 2021-01-01 RX ADMIN — ATORVASTATIN CALCIUM 10 MG: 10 TABLET, FILM COATED ORAL at 21:49

## 2021-01-01 RX ADMIN — Medication: at 17:16

## 2021-01-01 RX ADMIN — Medication: at 08:20

## 2021-01-01 RX ADMIN — CARVEDILOL 3.12 MG: 3.12 TABLET, FILM COATED ORAL at 10:16

## 2021-01-01 RX ADMIN — NYSTATIN: 100000 POWDER TOPICAL at 10:30

## 2021-01-01 RX ADMIN — THIAMINE HYDROCHLORIDE 200 MG: 100 INJECTION, SOLUTION INTRAMUSCULAR; INTRAVENOUS at 09:04

## 2021-01-01 RX ADMIN — FLUCONAZOLE 200 MG: 2 INJECTION, SOLUTION INTRAVENOUS at 01:45

## 2021-01-01 RX ADMIN — Medication 10 ML: at 21:36

## 2021-01-01 RX ADMIN — VANCOMYCIN HYDROCHLORIDE 1000 MG: 1 INJECTION, POWDER, LYOPHILIZED, FOR SOLUTION INTRAVENOUS at 21:56

## 2021-01-01 RX ADMIN — CLINDAMYCIN IN 5 PERCENT DEXTROSE 900 MG: 18 INJECTION, SOLUTION INTRAVENOUS at 17:48

## 2021-01-01 RX ADMIN — ACETAMINOPHEN 650 MG: 325 TABLET ORAL at 17:18

## 2021-01-01 RX ADMIN — METOPROLOL TARTRATE 2.5 MG: 5 INJECTION INTRAVENOUS at 14:17

## 2021-01-01 RX ADMIN — ASPIRIN 81 MG: 81 TABLET, COATED ORAL at 08:56

## 2021-01-01 RX ADMIN — Medication 1 TABLET: at 17:24

## 2021-01-01 RX ADMIN — CEFTRIAXONE 1 G: 1 INJECTION, POWDER, FOR SOLUTION INTRAMUSCULAR; INTRAVENOUS at 00:38

## 2021-01-01 RX ADMIN — NYSTATIN: 100000 POWDER TOPICAL at 17:48

## 2021-01-01 RX ADMIN — CARVEDILOL 3.12 MG: 3.12 TABLET, FILM COATED ORAL at 17:25

## 2021-01-01 RX ADMIN — ALBUMIN (HUMAN) 12.5 G: 0.25 INJECTION, SOLUTION INTRAVENOUS at 08:59

## 2021-01-01 RX ADMIN — HEPARIN SODIUM 5000 UNITS: 5000 INJECTION INTRAVENOUS; SUBCUTANEOUS at 10:16

## 2021-01-01 RX ADMIN — METOPROLOL TARTRATE 2.5 MG: 5 INJECTION INTRAVENOUS at 00:56

## 2021-01-01 RX ADMIN — CLINDAMYCIN IN 5 PERCENT DEXTROSE 900 MG: 18 INJECTION, SOLUTION INTRAVENOUS at 17:19

## 2021-01-01 RX ADMIN — Medication 10 ML: at 14:48

## 2021-01-01 RX ADMIN — SODIUM CHLORIDE 125 ML/HR: 9 INJECTION, SOLUTION INTRAVENOUS at 02:07

## 2021-01-01 RX ADMIN — SODIUM CHLORIDE 10 ML: 9 INJECTION, SOLUTION INTRAMUSCULAR; INTRAVENOUS; SUBCUTANEOUS at 13:44

## 2021-01-01 RX ADMIN — CLINDAMYCIN IN 5 PERCENT DEXTROSE 900 MG: 18 INJECTION, SOLUTION INTRAVENOUS at 17:58

## 2021-01-01 RX ADMIN — Medication 10 ML: at 22:02

## 2021-01-01 RX ADMIN — FOLIC ACID 1 MG: 1 TABLET ORAL at 10:16

## 2021-01-01 RX ADMIN — SODIUM CHLORIDE, POTASSIUM CHLORIDE, SODIUM LACTATE AND CALCIUM CHLORIDE 50 ML/HR: 600; 310; 30; 20 INJECTION, SOLUTION INTRAVENOUS at 21:57

## 2021-01-01 RX ADMIN — SODIUM CHLORIDE, POTASSIUM CHLORIDE, SODIUM LACTATE AND CALCIUM CHLORIDE 100 ML/HR: 600; 310; 30; 20 INJECTION, SOLUTION INTRAVENOUS at 05:12

## 2021-01-01 RX ADMIN — Medication 10 ML: at 15:21

## 2021-01-01 RX ADMIN — MICONAZOLE NITRATE: 20 CREAM TOPICAL at 18:03

## 2021-01-01 RX ADMIN — POTASSIUM CHLORIDE 10 MEQ: 10 INJECTION, SOLUTION INTRAVENOUS at 12:08

## 2021-01-01 RX ADMIN — MIDODRINE HYDROCHLORIDE 5 MG: 5 TABLET ORAL at 16:25

## 2021-01-01 RX ADMIN — LIDOCAINE HYDROCHLORIDE 60 MG: 20 INJECTION, SOLUTION EPIDURAL; INFILTRATION; INTRACAUDAL; PERINEURAL at 12:33

## 2021-01-01 RX ADMIN — SODIUM CHLORIDE, POTASSIUM CHLORIDE, SODIUM LACTATE AND CALCIUM CHLORIDE 100 ML/HR: 600; 310; 30; 20 INJECTION, SOLUTION INTRAVENOUS at 18:01

## 2021-01-01 RX ADMIN — HEPARIN SODIUM 5000 UNITS: 5000 INJECTION INTRAVENOUS; SUBCUTANEOUS at 08:47

## 2021-01-01 RX ADMIN — DEXTROSE MONOHYDRATE 75 ML/HR: 50 INJECTION, SOLUTION INTRAVENOUS at 20:05

## 2021-01-01 RX ADMIN — HEPARIN SODIUM 5000 UNITS: 5000 INJECTION INTRAVENOUS; SUBCUTANEOUS at 20:46

## 2021-01-01 RX ADMIN — FOLIC ACID 1 MG: 1 TABLET ORAL at 08:22

## 2021-01-01 RX ADMIN — CARVEDILOL 3.12 MG: 3.12 TABLET, FILM COATED ORAL at 08:56

## 2021-01-01 RX ADMIN — Medication 1 TABLET: at 12:12

## 2021-01-01 RX ADMIN — ACETAMINOPHEN 650 MG: 325 TABLET ORAL at 13:38

## 2021-01-01 RX ADMIN — TRANEXAMIC ACID 1 G: 100 INJECTION, SOLUTION INTRAVENOUS at 13:00

## 2021-01-01 RX ADMIN — Medication 1 TABLET: at 08:22

## 2021-01-01 RX ADMIN — HEPARIN SODIUM 5000 UNITS: 5000 INJECTION INTRAVENOUS; SUBCUTANEOUS at 10:29

## 2021-01-01 RX ADMIN — SODIUM CHLORIDE 10 ML: 9 INJECTION, SOLUTION INTRAMUSCULAR; INTRAVENOUS; SUBCUTANEOUS at 22:21

## 2021-01-01 RX ADMIN — THIAMINE HYDROCHLORIDE 200 MG: 100 INJECTION, SOLUTION INTRAMUSCULAR; INTRAVENOUS at 08:47

## 2021-01-01 RX ADMIN — MICONAZOLE NITRATE: 20 CREAM TOPICAL at 17:52

## 2021-01-01 RX ADMIN — VANCOMYCIN HYDROCHLORIDE 750 MG: 750 INJECTION, POWDER, LYOPHILIZED, FOR SOLUTION INTRAVENOUS at 18:16

## 2021-01-01 RX ADMIN — MULTIPLE VITAMINS W/ MINERALS TAB 1 TABLET: TAB at 09:45

## 2021-01-01 RX ADMIN — NYSTATIN: 100000 POWDER TOPICAL at 09:00

## 2021-01-01 RX ADMIN — CEFAZOLIN 2 G: 1 INJECTION, POWDER, FOR SOLUTION INTRAMUSCULAR; INTRAVENOUS; PARENTERAL at 12:48

## 2021-01-01 RX ADMIN — SODIUM CHLORIDE 75 ML/HR: 450 INJECTION, SOLUTION INTRAVENOUS at 02:05

## 2021-01-01 RX ADMIN — NYSTATIN: 100000 POWDER TOPICAL at 08:33

## 2021-01-01 RX ADMIN — HEPARIN SODIUM 5000 UNITS: 5000 INJECTION INTRAVENOUS; SUBCUTANEOUS at 08:33

## 2021-01-01 RX ADMIN — ATORVASTATIN CALCIUM 10 MG: 10 TABLET, FILM COATED ORAL at 21:50

## 2021-01-01 RX ADMIN — NYSTATIN: 100000 POWDER TOPICAL at 17:18

## 2021-01-01 RX ADMIN — HEPARIN SODIUM 5000 UNITS: 5000 INJECTION INTRAVENOUS; SUBCUTANEOUS at 19:44

## 2021-01-01 RX ADMIN — Medication 10 ML: at 06:32

## 2021-01-01 RX ADMIN — SODIUM CHLORIDE, POTASSIUM CHLORIDE, SODIUM LACTATE AND CALCIUM CHLORIDE 100 ML/HR: 600; 310; 30; 20 INJECTION, SOLUTION INTRAVENOUS at 15:38

## 2021-01-01 RX ADMIN — METOPROLOL TARTRATE 2.5 MG: 5 INJECTION INTRAVENOUS at 10:24

## 2021-01-01 RX ADMIN — NYSTATIN: 100000 POWDER TOPICAL at 08:59

## 2021-01-01 RX ADMIN — HEPARIN SODIUM 5000 UNITS: 5000 INJECTION INTRAVENOUS; SUBCUTANEOUS at 20:18

## 2021-01-01 RX ADMIN — CLINDAMYCIN IN 5 PERCENT DEXTROSE 900 MG: 18 INJECTION, SOLUTION INTRAVENOUS at 10:47

## 2021-01-01 RX ADMIN — CLINDAMYCIN IN 5 PERCENT DEXTROSE 900 MG: 18 INJECTION, SOLUTION INTRAVENOUS at 17:44

## 2021-01-01 RX ADMIN — METOPROLOL TARTRATE 2.5 MG: 5 INJECTION INTRAVENOUS at 06:43

## 2021-01-01 RX ADMIN — DEXTROSE MONOHYDRATE AND SODIUM CHLORIDE 75 ML/HR: 5; .45 INJECTION, SOLUTION INTRAVENOUS at 08:39

## 2021-01-01 RX ADMIN — ATORVASTATIN CALCIUM 10 MG: 10 TABLET, FILM COATED ORAL at 21:18

## 2021-01-01 RX ADMIN — DEXTROSE MONOHYDRATE AND SODIUM CHLORIDE 250 ML: 5; .45 INJECTION, SOLUTION INTRAVENOUS at 08:54

## 2021-01-01 RX ADMIN — MICONAZOLE NITRATE: 20 CREAM TOPICAL at 08:48

## 2021-01-01 RX ADMIN — FLUCONAZOLE 200 MG: 2 INJECTION, SOLUTION INTRAVENOUS at 01:46

## 2021-01-01 RX ADMIN — NYSTATIN: 100000 POWDER TOPICAL at 17:25

## 2021-01-01 RX ADMIN — IOPAMIDOL 100 ML: 755 INJECTION, SOLUTION INTRAVENOUS at 20:12

## 2021-01-01 RX ADMIN — HEPARIN SODIUM 5000 UNITS: 5000 INJECTION INTRAVENOUS; SUBCUTANEOUS at 09:17

## 2021-01-01 RX ADMIN — DEXTROSE MONOHYDRATE AND SODIUM CHLORIDE 75 ML/HR: 5; .45 INJECTION, SOLUTION INTRAVENOUS at 02:32

## 2021-01-01 RX ADMIN — POTASSIUM CHLORIDE 10 MEQ: 10 INJECTION, SOLUTION INTRAVENOUS at 08:13

## 2021-01-01 RX ADMIN — MIDODRINE HYDROCHLORIDE 5 MG: 5 TABLET ORAL at 21:50

## 2021-01-01 RX ADMIN — FENTANYL CITRATE 25 MCG: 50 INJECTION, SOLUTION INTRAMUSCULAR; INTRAVENOUS at 12:33

## 2021-01-01 RX ADMIN — SODIUM CHLORIDE 10 ML: 9 INJECTION, SOLUTION INTRAMUSCULAR; INTRAVENOUS; SUBCUTANEOUS at 22:29

## 2021-01-01 RX ADMIN — MICONAZOLE NITRATE: 20 CREAM TOPICAL at 17:27

## 2021-01-01 RX ADMIN — CLINDAMYCIN IN 5 PERCENT DEXTROSE 900 MG: 18 INJECTION, SOLUTION INTRAVENOUS at 02:42

## 2021-01-01 RX ADMIN — ROCURONIUM BROMIDE 5 MG: 10 INJECTION INTRAVENOUS at 12:33

## 2021-01-01 RX ADMIN — POTASSIUM CHLORIDE 10 MEQ: 10 INJECTION, SOLUTION INTRAVENOUS at 13:00

## 2021-01-01 RX ADMIN — THIAMINE HYDROCHLORIDE 500 MG: 100 INJECTION, SOLUTION INTRAMUSCULAR; INTRAVENOUS at 20:17

## 2021-01-01 RX ADMIN — DEXTROSE MONOHYDRATE 50 ML/HR: 50 INJECTION, SOLUTION INTRAVENOUS at 05:19

## 2021-01-01 RX ADMIN — NYSTATIN: 100000 POWDER TOPICAL at 17:52

## 2021-01-01 RX ADMIN — NYSTATIN: 100000 POWDER TOPICAL at 17:27

## 2021-01-01 RX ADMIN — DEXTROSE MONOHYDRATE 75 ML/HR: 50 INJECTION, SOLUTION INTRAVENOUS at 03:15

## 2021-01-01 RX ADMIN — Medication: at 17:48

## 2021-01-01 RX ADMIN — SODIUM CHLORIDE, POTASSIUM CHLORIDE, SODIUM LACTATE AND CALCIUM CHLORIDE 25 ML/HR: 600; 310; 30; 20 INJECTION, SOLUTION INTRAVENOUS at 09:30

## 2021-01-01 RX ADMIN — MAGNESIUM SULFATE HEPTAHYDRATE 2 G: 40 INJECTION, SOLUTION INTRAVENOUS at 09:21

## 2021-01-01 RX ADMIN — ALBUMIN (HUMAN) 12.5 G: 12.5 INJECTION, SOLUTION INTRAVENOUS at 12:03

## 2021-01-01 RX ADMIN — SODIUM CHLORIDE 10 ML: 9 INJECTION, SOLUTION INTRAMUSCULAR; INTRAVENOUS; SUBCUTANEOUS at 13:39

## 2021-01-01 RX ADMIN — ACETAMINOPHEN 650 MG: 325 TABLET ORAL at 23:25

## 2021-01-01 RX ADMIN — HEPARIN SODIUM 5000 UNITS: 5000 INJECTION INTRAVENOUS; SUBCUTANEOUS at 21:35

## 2021-01-01 RX ADMIN — HEPARIN SODIUM 5000 UNITS: 5000 INJECTION INTRAVENOUS; SUBCUTANEOUS at 19:53

## 2021-01-01 RX ADMIN — Medication: at 11:56

## 2021-01-01 RX ADMIN — ASPIRIN 81 MG: 81 TABLET, COATED ORAL at 10:07

## 2021-01-01 RX ADMIN — CARVEDILOL 3.12 MG: 3.12 TABLET, FILM COATED ORAL at 08:47

## 2021-01-01 RX ADMIN — MICONAZOLE NITRATE: 20 CREAM TOPICAL at 17:17

## 2021-01-01 RX ADMIN — MICONAZOLE NITRATE: 20 CREAM TOPICAL at 17:48

## 2021-01-01 RX ADMIN — HEPARIN SODIUM 5000 UNITS: 5000 INJECTION INTRAVENOUS; SUBCUTANEOUS at 10:08

## 2021-01-01 RX ADMIN — ONDANSETRON HYDROCHLORIDE 4 MG: 2 INJECTION, SOLUTION INTRAMUSCULAR; INTRAVENOUS at 13:40

## 2021-01-01 RX ADMIN — ATORVASTATIN CALCIUM 10 MG: 10 TABLET, FILM COATED ORAL at 22:20

## 2021-01-01 RX ADMIN — ALBUMIN (HUMAN) 12.5 G: 0.25 INJECTION, SOLUTION INTRAVENOUS at 21:50

## 2021-01-01 RX ADMIN — Medication 10 ML: at 21:49

## 2021-01-01 RX ADMIN — THIAMINE HYDROCHLORIDE 200 MG: 100 INJECTION, SOLUTION INTRAMUSCULAR; INTRAVENOUS at 11:35

## 2021-01-01 RX ADMIN — Medication 10 ML: at 14:17

## 2021-01-01 RX ADMIN — POTASSIUM CHLORIDE 10 MEQ: 10 INJECTION, SOLUTION INTRAVENOUS at 08:00

## 2021-01-01 RX ADMIN — WATER 2 G: 1 INJECTION INTRAMUSCULAR; INTRAVENOUS; SUBCUTANEOUS at 22:29

## 2021-01-01 RX ADMIN — NYSTATIN: 100000 POWDER TOPICAL at 17:26

## 2021-02-16 PROBLEM — B37.2 YEAST DERMATITIS: Status: ACTIVE | Noted: 2021-01-01

## 2021-02-16 PROBLEM — D72.829 LEUKOCYTOSIS: Status: ACTIVE | Noted: 2021-01-01

## 2021-02-16 PROBLEM — R41.82 AMS (ALTERED MENTAL STATUS): Status: ACTIVE | Noted: 2021-01-01

## 2021-02-16 PROBLEM — N39.0 UTI (URINARY TRACT INFECTION): Status: ACTIVE | Noted: 2021-01-01
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
## 2021-02-16 NOTE — PROGRESS NOTES
OT ORDER RECEIVED, we are currently following and will see patient for OT eval when she can actively participate. Per chart and CM note, Per  patient is cared for by 2 daughters as well as . Pt  expressed that patient had been bed bound for a month and is total dependent on care for her ADLS. Pt  did not express any concern or barriers to providing care for patient.   
Sherlyn Saunders, OTR/L

## 2021-02-16 NOTE — PROGRESS NOTES
Problem: Dysphagia (Adult) Goal: *Acute Goals and Plan of Care (Insert Text) Description: Initiated 2/16/2021 1. Patient will tolerate sips of water and ice chips free of s/s of aspiration within 7 days. 2. Patient will participate in reassessment of swallow function within 7 days. Outcome: Not Met SPEECH LANGUAGE PATHOLOGY BEDSIDE SWALLOW EVALUATION Patient: Carey Vaca (13 y.o. female) Date: 2/16/2021 Primary Diagnosis: AMS (altered mental status) [R41.82] UTI (urinary tract infection) [N39.0] Yeast dermatitis [B37.2] Leukocytosis [D72.829] Precautions: fall ASSESSMENT : 
Based on the objective data described below, the patient presents with limited bolus acceptance, so limited clinical examination of swallow function. She has decreased oral opening, but does accept small ice chips and teaspoon amounts of water. She demonstrated adequate bolus manipulation for these with multiple swallows noted after water trials. This may be related to pharyngeal residue or penetration. No overt s/s of aspiration were noted. She held her lips closed when presented with trials of puree and did not attempt to lick the residue off her lips. It was cleaned off at the conclusion of the assessment. She tends to grimace when manipulating a bolus and at one point held her chin as if in pain. She doesn't open her mouth enough to fully assess for oral integrity. She has limited dentition. Patient will benefit from skilled intervention to address the above impairments. Patients rehabilitation potential is considered to be Fair PLAN : 
Recommendations and Planned Interventions: 
OK for ice chips and sips of water when actively accepting Oral care if she allows Will follow for advancement as appropriate. Frequency/Duration: Patient will be followed by speech-language pathology 3 times a week to address goals. Discharge Recommendations: To Be Determined SUBJECTIVE:  
 Patient nonverbal. She made eye contact and followed SLP around the room. Very minimal command following. OBJECTIVE:  
 
Past Medical History:  
Diagnosis Date CHF (congestive heart failure) ([facility] 4/15/2011 CHF (congestive heart failure) ([facility] 4/15/2011 Diabetes ([facility] Diastolic CHF, acute ([facility] 3/19/2011 Hypertension No past surgical history on file. Prior Level of Function/Home Situation: Patient unable to provide history and no family present Diet prior to admission: unknown Current Diet:  NPO Cognitive and Communication Status: 
Neurologic State: Alert Orientation Level: Unable to verbalize Cognition: Decreased command following Oral Assessment: 
Oral Assessment Labial: (did not follow commands to fully assess, grossly symmetrical) Dentition: Limited;Natural 
Oral Hygiene: very dry, decreased oral opening to fully visualize Lingual: (able to protrude, did not follow commands to lateralize) P.O. Trials: 
Patient Position: HOB slightly elevated, limited due to patient grimacing as if in pain Vocal quality prior to P.O.: (no vocalizations) Consistency Presented: Ice chips; Thin liquid;Puree How Presented: Kristi Owen Bolus Acceptance: Impaired(decreased oral opening) Bolus Formation/Control: (functional for scant trials given) Propulsion: No impairment Oral Residue: None Aspiration Signs/Symptoms: None Pharyngeal Phase Characteristics: Multiple swallows NOMS:  
The NOMS functional outcome measure was used to quantify this patient's level of swallowing impairment. Based on the NOMS, the patient was determined to be at level 3 for swallow function NOMS Swallowing Levels: 
Level 1 (CN): NPO Level 2 (CM): NPO but takes consistency in therapy Level 3 (CL): Takes less than 50% of nutrition p.o. and continues with nonoral feedings; and/or safe with mod cues; and/or max diet restriction Level 4 (CK): Safe swallow but needs mod cues; and/or mod diet restriction; and/or still requires some nonoral feeding/supplements Level 5 (CJ): Safe swallow with min diet restriction; and/or needs min cues Level 6 (CI): Independent with p.o.; rare cues; usually self cues; may need to avoid some foods or needs extra time Level 7 (CH): Independent for all p.o. ALEXIS. (2003). National Outcomes Measurement System (NOMS): Adult Speech-Language Pathology User's Guide. Pain: 
Pain Scale 1: Numeric (0 - 10) Pain Intensity 1: 0 After treatment:  
Patient left in no apparent distress in bed, Nursing notified, and Nursing present COMMUNICATION/EDUCATION:  
Patient was educated regarding the purpose of SLP visit. She made no attempt to communicate. The patient's plan of care including recommendations, planned interventions, and recommended diet changes were discussed with: Registered nurse. Patient is unable to participate in goal setting and plan of care. Thank you for this referral. 
Sully Patel SLP Time Calculation: 15 mins

## 2021-02-16 NOTE — REMOTE MONITORING
Spoke with [facility] RN in regards to three hour sepsis bundle. Miguel Lozano RN, BSN Sepsis [place] [phone]

## 2021-02-16 NOTE — ED NOTES
Report received from [facility]. They advised of the patient's chief complaint, current status, orders completed (to include IV access/medications/radiology testing), outstanding orders that still need to be completed, and the treatment plan. Questions asked and answered prior to assumption of care.

## 2021-02-16 NOTE — ED NOTES
SLP reports patient was only able to tolerate ice chips 1000: patient cleaned up and mepiplex was replaced. Pt also turned on L side and heels floated
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
## 2021-02-16 NOTE — PROGRESS NOTES
Physical Therapy Received eval order. Chart reviewed and spoke with RN. Pt not following commands well, has been recently bedridden per family. Social situation being clarified and more history to come. Pt not appropriate for eval today. Will monitor and follow for eval if and when appropriate.  
 
Henny Portillo, PT

## 2021-02-16 NOTE — PROGRESS NOTES
Lovenox adjusted to heparin 5000 units sc every 12 hr for body weight < 60 kg per protocol IRA HutchinsD
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
## 2021-02-16 NOTE — ED NOTES
TRANSFER - OUT REPORT: 
 
Verbal report given to [employer] (name) on Carey Vaca  being transferred to [facility] (unit) for routine progression of care Report consisted of patients Situation, Background, Assessment and  
Recommendations(SBAR). Information from the following report(s) SBAR, ED Summary, [facility] and Recent Results was reviewed with the receiving nurse. Lines:  
Peripheral IV 02/15/21 Forearm (Active) Site Assessment Clean, dry, & intact 02/15/21 2153 Phlebitis Assessment 0 02/15/21 2153 Infiltration Assessment 0 02/15/21 2153 Dressing Status Clean, dry, & intact 02/15/21 2153 Dressing Type Tape;Transparent 02/15/21 2153 Peripheral IV 02/16/21 Left Forearm (Active) Site Assessment Clean, dry, & intact 02/16/21 0225 Phlebitis Assessment 0 02/16/21 0225 Infiltration Assessment 0 02/16/21 0225 Dressing Status Clean, dry, & intact 02/16/21 0225 Dressing Type Transparent 02/16/21 0225 Hub Color/Line Status Patent; Flushed;Capped 02/16/21 0225 Action Taken Blood drawn 02/16/21 0225 Opportunity for questions and clarification was provided. Pilar Young reports she will call when room is clean for patient.

## 2021-02-16 NOTE — FORENSIC NURSE
JANE performed head to toe assessment and photographs. APS report made by EUNICE. Findings discussed with . SBAR given to [place] RN.

## 2021-02-16 NOTE — ED NOTES
Report received from [facility]. They advised of the patient's chief complaint, current status, orders completed (to include IV access/medications/radiology testing), outstanding orders that still need to be completed, and the treatment plan. Questions asked and answered prior to assumption of care.

## 2021-02-16 NOTE — ED NOTES
Cleaned and repositioned patient after doing vaginal swabs. Patient's heels were floated and patient propped up on her right side to keep her off of her sacrum.

## 2021-02-16 NOTE — ED NOTES
Patient cleaned, linens changed and patient repositioned. There are multiple wounds observed on the patient. She has two small wounds to the left lateral foot at the base of the pinky toe and on the lateral aspect of the pinky toe itself, another small eschar appearing spot on the right lateral foot, a large calus to the left heel, a pressure ulcer to the right heel, multiple pressure ulcers to the sacrum, several skin avulsions also to the sacrum and buttock, sores secondary to escoriation on the groin and inner thighs, abrasions to the labia minora and majora from patient scratching herself, and a wound to the back that is perfectly horizontal in presentation with the region on the left upper back showing subcutaneous fatty tissue. Patient's skin was very dry, there was a large amount of grey discharge coming from the patient's vagina and dried stool stuck to her skin. Patient also has a significant amount of dirt on the fingers and under her nails. Patient is very emaciated as well. Per the chart she was over 130 pounds a year ago when she was here. She weighs in at 98 pounds today. Forensics nurse and physician have been notified of concerns.

## 2021-02-16 NOTE — ED NOTES
Patient failed swallow screening as she is altered and refusing anything PO. Notified Dr. Topher Goodwin and placed a SLP order.
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
## 2021-02-16 NOTE — PROGRESS NOTES
Transition of Care Plan: 
 
Disposition: TBD Follow up appointments: PCP Transportation at Discharge: BLS transport DME needed: TBD IM Medicare letter: 2nd IM letter needed Caregiver Contact: Trupti Vu [phone] Reason for Admission:   AMS 
               
RUR Score:   19 %, medium risk for readmission PCP: First and Last name:  Dr. Shanon Aguayo Name of Practice: [facility] Are you a current patient: Yes/No:  Yes Approximate date of last visit: virtual appt 11/9/20 Can you participate in a virtual visit if needed:  Yes Do you (patient/family) have any concerns for transition/discharge? Pts  did not express any concerns for discharge Plan for utilizing home health:   TBD pt does not have history of home health Current Advanced Directive/Advance Care Plan:   
Advance Care Planning General Advance Care Planning (ACP) Conversation Date of Conversation: 2/15/2021 Conducted with: Patient's  Trupti Vu Content/Action Overview:  
Patient is alert and oriented x 1 not appropriate to complete AMD at this time Reviewed DNR/DNI and patient elects Full Code (Attempt Resuscitation) Length of Voluntary ACP Conversation in minutes:  16 minutes Stephanie Parra, RN Transition of Care Plan: CM placed call to Joyceann Carrel [phone] to discuss discharge planning. Patient name, , and demographics all verified in chart. Patient lived with in a two story home with he spouse and her 2 daughters. Patient's  reported that a month ago patient became bedridden and totally dependent of her ADL's. According to pt  before patient had been independent and ambulating with out an assistive device. Pt  stated that between daughters assisting with ADLs as well as him he has good help to care for patient. Pt  expressed no concerns with care. CM inquired if patient receives any type of home health services such as skilled nursing, wound care, or therapy. Pt  stated she does not. Pt  stated that they help patient with all her ADL's  including bathing, dressing, and feeding. Pt  reports that patient has not eaten much in the last few days. Patient's preferred pharmacy is [facility]. Patient's  reports no home health, SNF, or IPR for patient. Unit CM to continue to follow for discharge needs and planning. KRZYSZTOF CarreonN, RN, INDIRA Anthony Care Manager

## 2021-02-16 NOTE — ED NOTES
Spoke to Cintia with forensics and updated her on the concerns regarding this patient. She advised that she will be coming out to see the patient.

## 2021-02-16 NOTE — PROGRESS NOTES
OCCUPATIONAL THERAPY: 
  
Received eval order. Chart reviewed and PT spoke with RN. Pt not following commands well, has been recently bedridden per family. Social situation being clarified and more history to come. Pt not appropriate for eval today. Will monitor and follow for eval if and when appropriate.  
Andrew Barrera, OTR/L

## 2021-02-17 PROBLEM — S72.009A HIP FRACTURE (HCC): Status: ACTIVE | Noted: 2021-01-01

## 2021-02-17 PROBLEM — I42.8 NICM (NONISCHEMIC CARDIOMYOPATHY) (HCC): Status: ACTIVE | Noted: 2021-01-01

## 2021-02-17 PROBLEM — R62.7 FAILURE TO THRIVE IN ADULT: Status: ACTIVE | Noted: 2021-01-01

## 2021-02-17 NOTE — PROGRESS NOTES
Bedside and Verbal shift change report given to Emily Wagner (oncoming nurse) by Emily Wagner  (offgoing nurse). Report included the following information SBAR, Kardex, ED Summary, Procedure Summary, MAR and Recent Results.

## 2021-02-17 NOTE — CONSULTS
[address] [facility] Date of  Admission: 2/15/2021  9:40 PM  
 
Admission type:Emergency Consult for: NICM, hip fx Consult by: Subjective:  
 
Lacey Callahan is a 82 y.o. female with PMH HFrEF, HTN, HLD, DM, pulmonary nodule who was  admitted for AMS (altered mental status) [R41.82] UTI (urinary tract infection) [N39.0] Yeast dermatitis [B37.2] Leukocytosis [D72.829]. Per ED provider note Lacey Callahan presented to the ED with c/o altered mental status. Family was unable to keep patient awake. Has not been eating or drinking well with significant recent weight loss. Cardiology consulted for NICM history with hip fracture. On assessment, Lacey Callahan endorses left hip pain. She denies chest pain, SOB, palpitations, leg swelling, orthopnea, dizziness/lightheadedness. Patient states she slipped out of the bed about a month ago. Daughter at bedside states the patient has been bed bound since. The patient would cry out in pain when they turned her, but daughter states she and her other family members didn't realize the patient's hip was broken. Patient had refused treatment previously per daughter. Lacey Callahan  Followed with Dr. Ron Burrell in the past for cardiology, but was lost to follow up several years ago. Last ECHO this admission with EF 30-35% and multiple areas of hypokinesis. Cath 05/11 with non obstructive CAD. Patient Active Problem List  
 Diagnosis Date Noted  Hip fracture ([facility] [address] 02/17/2021  Failure to thrive in adult 02/17/2021  Leukocytosis 02/16/2021  UTI (urinary tract infection) 02/16/2021  Yeast dermatitis 02/16/2021  AMS (altered mental status) 02/16/2021  Iron deficiency anemia 08/09/2016  Type II or unspecified type diabetes mellitus without mention of complication, not stated as uncontrolled 05/30/2012  Pulmonary nodules 04/29/2011  CHF (congestive heart failure) ([facility] [address] 04/15/2011  HTN (hypertension), malignant 03/19/2011  DM (diabetes mellitus), type 2, uncontrolled ([facility] 03/19/2011  Unspecified pleural effusion 03/19/2011 Kirstie Swift MD 
Past Medical History:  
Diagnosis Date  CHF (congestive heart failure) ([facility] 4/15/2011  CHF (congestive heart failure) ([facility] 4/15/2011  Diabetes ([address]  Diastolic CHF, acute ([facility] 3/19/2011  Hypertension Social History Socioeconomic History  Marital status:  Spouse name: Not on file  Number of children: Not on file  Years of education: Not on file  Highest education level: Not on file Tobacco Use  Smoking status: Never Smoker  Smokeless tobacco: Never Used Substance and Sexual Activity  Alcohol use: No  
 Drug use: No  
 Sexual activity: Never No Known Allergies Family History Problem Relation Age of Onset  Heart Disease Father  Hypertension Sister  Diabetes Sister  Asthma Sister  Diabetes Brother  Hypertension Brother  Hypertension Sister  Heart Disease Sister  Diabetes Brother  Hypertension Brother Current Facility-Administered Medications Medication Dose Route Frequency  aspirin delayed-release tablet 81 mg  81 mg Oral DAILY  atorvastatin (LIPITOR) tablet 10 mg  10 mg Oral QHS  ergocalciferol capsule 50,000 Units  50,000 Units Oral Q7D  
 sodium chloride (NS) flush 5-40 mL  5-40 mL IntraVENous Q8H  
 sodium chloride (NS) flush 5-40 mL  5-40 mL IntraVENous PRN  
 acetaminophen (TYLENOL) tablet 650 mg  650 mg Oral Q6H PRN Or  
 acetaminophen (TYLENOL) suppository 650 mg  650 mg Rectal Q6H PRN  polyethylene glycol (MIRALAX) packet 17 g  17 g Oral DAILY PRN  promethazine (PHENERGAN) tablet 12.5 mg  12.5 mg Oral Q6H PRN  Or  
 ondansetron (ZOFRAN) injection 4 mg  4 mg IntraVENous Q6H PRN  
 0.45% sodium chloride infusion  75 mL/hr IntraVENous CONTINUOUS  
  fluconazole (DIFLUCAN) 200mg/100 mL IVPB (premix)  200 mg IntraVENous Q24H  nystatin (MYCOSTATIN) 100,000 unit/gram powder   Topical BID  cefTRIAXone (ROCEPHIN) 1 g in 0.9% sodium chloride (MBP/ADV) 50 mL MBP  1 g IntraVENous Q24H  
 insulin lispro (HUMALOG) injection   SubCUTAneous AC&HS  
 glucose chewable tablet 16 g  4 Tab Oral PRN  
 dextrose (D50W) injection syrg 12.5-25 g  12.5-25 g IntraVENous PRN  
 glucagon (GLUCAGEN) injection 1 mg  1 mg IntraMUSCular PRN  
 carvediloL (COREG) tablet 12.5 mg  12.5 mg Oral BID WITH MEALS  
 heparin (porcine) injection 5,000 Units  5,000 Units SubCUTAneous Q12H  clindamycin (CLEOCIN) 900mg D5W 50mL IVPB (premix)  900 mg IntraVENous Q8H  
 balsam peru-castor oiL (VENELEX) ointment   Topical BID  miconazole (SECURA) 2 % extra thick cream   Topical BID  thiamine (B-1) 200 mg in 0.9% sodium chloride 50 mL IVPB  200 mg IntraVENous DAILY Review of Symptoms:  
11 systems reviewed, negative other than as stated in the HPI Objective:  
  
Visit Vitals /68 Pulse 76 Temp 97 °F (36.1 °C) Resp 15 Ht 5' 6\" (1.676 m) Wt 44.9 kg (98 lb 14.4 oz) SpO2 100% BMI 15.96 kg/m² Physical:  
General: pleasant, elderly AAF resting in bed in NAD Heart: RRR, no m/S3/JVD Lungs: clear Abdomen: Soft, +BS, NTND Extremities: LE bertha +DP/PT, no edema. LLE externally rotated Neurologic: Grossly normal;  Disoriented to place Data Review:  
Recent Labs  
  02/17/21 
1054 02/16/21 
0201 02/15/21 
2156 WBC 29.6* 34.1* 27.0*  
HGB 11.5 11.6 11.1*  
HCT 37.5 37.7 35.7  390 405* Recent Labs  
  02/17/21 
1054 02/16/21 
0201 02/15/21 
2156 * 156* 154* K 3.9 4.3 4.6 * 124* 119* CO2 26 24 25  167* 154* BUN 58* 64* 70* CREA 1.12* 1.41* 1.65* CA 8.6 8.4* 8.7 MG 2.7* 2.8*  --   
PHOS 3.5  --   --   
ALB 1.9* 2.0* 2.1* TBILI 0.3 0.6 0.6 ALT 10* 9* 7* Recent Labs  
  02/17/21 [phone] 02/15/21 
2156 TROIQ  --  <0.05  
  -- Lab Results Component Value Date/Time Cholesterol, total 140 01/18/2017 10:36 AM  
 HDL Cholesterol 76 01/18/2017 10:36 AM  
 LDL, calculated 53 01/18/2017 10:36 AM  
 VLDL, calculated 11 01/18/2017 10:36 AM  
 Triglyceride 53 01/18/2017 10:36 AM  
 CHOL/HDL Ratio 3.0 03/20/2011 04:20 AM  
  
No intake or output data in the 24 hours ending 02/17/21 1447 Cardiographics Telemetry: SR with PVCs and 1 NSVT 
ECG: NSR;  ST and T wave abnormality - no significant changes from EKG in 2014 Echocardiogram: EF 30-35% and multiple areas of hypokinesis CXRAY: n/a Assessment:  
  
 Active Problems: 
  Leukocytosis (2/16/2021) UTI (urinary tract infection) (2/16/2021) Yeast dermatitis (2/16/2021) AMS (altered mental status) (2/16/2021) Hip fracture ([facility] [address] (2/17/2021) Failure to thrive in adult (2/17/2021) Plan:  
 
Vivek Riley is a 82 y.o. female who presented to the ED with AMS and not wanting to eat or drink. Recent significant 30+ lb weight loss in the past 6 months. Fell appx 1 month ago and significant [place] IV hip fracture noted on xray. EKG similar to prior. WBC 29.6; creat 1.65 down to 1.12; troponin negative; Na 154. · History of HFrEF/NICM. ECHO this admit with improvement in EF to 30-35%. Has been non complaint with meds. Recently resumed. · Continue BB, statin, and ASA for CAD history. If renal function continues to improve, plan to add ACEi/ARB after surgery · Patient receiving needed IVF. Monitor for signs of fluid overload. · Patient denies cardiac complaints; troponin negative; no significant changes to EKG. No ischemic work up needed prior to necessary hip surgery. Patient will be a higher risk due to her reduced ejection fraction. No further cardiac work up is needed prior to surgery. Will closely follow post op. Dr. Valeriy Alonso will f/u in AM. Thank you for consulting [facility]. Will follow with you.   
 
Mandeep Harman MD

## 2021-02-17 NOTE — PROGRESS NOTES
Comprehensive Nutrition Assessment Type and Reason for Visit: Initial, Positive nutrition screen, Consult Nutrition Recommendations/Plan: 
Advance diet as medically feasible Add ensure clear TID Nutrition Assessment:    
Patient medically noted for AMS, UTI, dehydration, FTT, and BRO. Patient with poor PO acceptance and interest per SLP note. Started on clear liquids today by SLP. Family at bedside reports patient had a poor appetite the few days leading up to admission but typically eats well. Has 2 full meals (breakfast and dinner) and 2 snacks (midday and HS). Does not utilize supplements. Family unsure of UBW or amount of weight loss. Per chart review, patient's had a significant 25.8% weight loss over the past 6 months. Severe muscle wasting and fat loss noted per NFPE. Patient meets criteria for severe malnutrition. Patient and family agreeable to trial of ensure clear; they don't think she would like the ensure enlive. Advance diet as medically feasible; at least to full liquids to allow more options within diet and supplements. Consider palliative care consult for goals of care discussion. Malnutrition Assessment: 
Malnutrition Status:  Severe malnutrition Context:  Acute illness Findings of the 6 clinical characteristics of malnutrition:  
Energy Intake:  7 - 50% or less of est energy requirements for 5 or more days Weight Loss:  7.00 - Greater than 7.5% over 3 months Body Fat Loss:  7 - Moderate body fat loss, Muscle Mass Loss:  7 - Moderate muscle mass loss, Fluid Accumulation:  No significant fluid accumulation,   
 Strength:  Not performed Estimated Daily Nutrient Needs: 
Energy (kcal): 1406 kcal ( x 1.2AF +300kcal); Weight Used for Energy Requirements: Current Protein (g): 68-77g (1.5-1.7 g/kg bw); Weight Used for Protein Requirements: Current Fluid (ml/day): 1400 mL; Method Used for Fluid Requirements: 1 ml/kcal 
 
Nutrition Related Findings: Na 154, -[phone] BM 2/16 Atorvastatin, Coreg, Humalog, Thiamine Wounds:   
Unstageable sacrum, Stage III spine, Deep tissue injury right heel Current Nutrition Therapies: DIET CLEAR LIQUID Anthropometric Measures: 
· Height:  5' 6\" (167.6 cm) · Current Body Wt:  44.9 kg (98 lb 15.8 oz) · BMI Category:  Underweight (BMI less than 18.5) Nutrition Diagnosis: · Severe malnutrition related to cognitive or neurological impairment, inadequate protein-energy intake as evidenced by weight loss greater than or equal to 10% in 6 months, severe muscle loss, severe loss of subcutaneous fat Nutrition Interventions:  
Food and/or Nutrient Delivery: Modify current diet, Start oral nutrition supplement Nutrition Education and Counseling: No recommendations at this time Coordination of Nutrition Care: Speech therapy, Interdisciplinary rounds Goals: 
PO intake at least 50% of meals/supplements next 1-3 days Nutrition Monitoring and Evaluation:  
Behavioral-Environmental Outcomes: None identified Food/Nutrient Intake Outcomes: Food and nutrient intake, Supplement intake, Diet advancement/tolerance Physical Signs/Symptoms Outcomes: Biochemical data, Chewing or swallowing, Hemodynamic status, Weight, Skin Discharge Planning: Too soon to determine Electronically signed by Marilee Galeano RD on 2/17/2021 at 2:52 PM 
 
Contact: ext [phone]

## 2021-02-17 NOTE — PROGRESS NOTES
Occupational Therapy 
Chart reviewed. Noted new finding of L hip fracture. Will defer OT evaluation again today pending ortho consult and medical appropriateness.

## 2021-02-17 NOTE — PROGRESS NOTES
Problem: Dysphagia (Adult) Goal: *Acute Goals and Plan of Care (Insert Text) Description: Initiated 2/16/2021 1. Patient will tolerate sips of water and ice chips free of s/s of aspiration within 7 days. OK for liquids (not just water) 2/17/2021 2. Patient will participate in reassessment of swallow function within 7 days. Outcome: Progressing Towards Goal 
 SPEECH LANGUAGE PATHOLOGY DYSPHAGIA TREATMENT Patient: Belen Speaker (9 y.o. female) Date: 2/17/2021 Diagnosis: AMS (altered mental status) [R41.82] UTI (urinary tract infection) [N39.0] Yeast dermatitis [B37.2] Leukocytosis [D72.829] <principal problem not specified> Precautions: aspiration ASSESSMENT: 
Patient continues to be limited by poor po acceptance or interest. Patient only opening mouth minimally to accept applesauce. She extracted scant amount only. She extracted liquid via straw without issue. Patient with slow posterior propulsion. Pharyngeal swallow initiation suspected to be mildly delayed with reduced hyolaryngeal movement via palpation. No overt s/s aspiration with scant puree or a few sips of thin before patient refused further. Suspect intake will remain minimal.   
 
PLAN: 
Recommendations and Planned Interventions: 
Ok for liquids (placed clear liquid diet but doesn't necessarily need to be clear). Sips via straw Nutritional supplement drinks? Could try crushing meds. .. perhaps in a drink? Do not think there will be a reliable way for her to take meds now given limited acceptance/refusal 
- consider palliative care to discuss goals Patient continues to benefit from skilled intervention to address the above impairments. Continue treatment per established plan of care. Discharge Recommendations:  None SUBJECTIVE:  
Patient stated Tressa Alejandra wish ya'll would leave me alone. OBJECTIVE:  
Cognitive and Communication Status: 
Neurologic State: Confused Orientation Level: Disoriented X4 
 Cognition: Impaired decision making, Poor safety awareness Dysphagia Treatment: P.O. Trials: 
Patient Position: (up in bed) Vocal quality prior to P.O.: No impairment Consistency Presented: Thin liquid;Puree How Presented: Straw;Spoon;SLP-fed/presented Bolus Acceptance: Impaired Bolus Formation/Control: Impaired Type of Impairment: Delayed; Incomplete(mouth opening) Propulsion: Delayed (# of seconds) Oral Residue: None Initiation of Swallow: Delayed (# of seconds) Laryngeal Elevation: Decreased Aspiration Signs/Symptoms: None Oral Phase Severity: Moderate Pharyngeal Phase Severity : Mild Pain: 
Pain Scale 1: Adult Nonverbal Pain Scale Pain Intensity 1: 0 After treatment:  
Patient left in no apparent distress in bed, Call bell within reach, and Nursing notified The patient's plan of care including recommendations, planned interventions, and recommended diet changes were discussed with: Registered nurse. CARRIE Millard Time Calculation: 16 mins

## 2021-02-17 NOTE — PROGRESS NOTES
Dressing change done to sacrum and mid back area as well as wound care. Pt's daughter remains at bedside. No distress noted. Comfort and safety measures in place. Call light in reach.

## 2021-02-17 NOTE — PROGRESS NOTES
Physician Progress Note Juan Stewart 
[facility] #:                  [id number] :                       1935 ADMIT DATE:       2/15/2021 9:40 PM 
[address] [place] DATE: 
RESPONDING 
PROVIDER #:        Jackie Guzman MD 
 
 
 
 
QUERY TEXT: 
 
Dear Hospitalist Team, Patient admitted with AMS and UTI. Per wound care progress note on  , patient is noted to also have an unstageable sacral pressure ulcer. If possible, please document in progress notes and discharge summary the stage of the pressure ulcer: The medical record reflects the following: 
 
Risk Factors: 82 Yr F admitted with AMS/UTI; APS/forensic nursing involved Clinical Indicators: Patient arrived to the ED with c/o AMS and falls. Patient was found to have dried feces with multiple wounds on the body. APS was notified. Wound care saw the patient on . Per NIHARIKA Zuniga progress note, Sacral Unstageable Pressure Injury complicated by incontinence of urine and stool. Sacrum and mid back wounds: daily, cleanse with wound cleanser spray and wipe clean with 4x4. Apply Silvasorb/Skintegrity wound gel to wounds with gloved finger and cover with a piece of Opticell- AG silver alginate and then a foam dressing. Treatment: Wound care consultation, daily wound care and specialty bed from [address] [place] [address] Thank you, Amparo Valiente [facility] Stage 1:  Non-blanchable erythema of intact skin Stage 2:  Abrasion, Blister, Partial-thickness skin loss, with exposed dermis Stage 3:  Full-thickness skin loss with damage or necrosis of subcutaneous tissue Stage 4:  Full-thickness skin & soft tissue loss through to underlying muscle, tendon or bone Unstageable: Obscured full-thickness skin & tissue loss Options provided: 
-- Deep tissue injury pressure ulcer of the sacrum present on admission 
-- Unstageable Pressure Ulcer of the sacrum present on admission 
-- Other - I will add my own diagnosis -- Disagree - Not applicable / Not valid -- Disagree - Clinically unable to determine / Unknown 
-- Refer to Clinical Documentation Reviewer PROVIDER RESPONSE TEXT: 
 
This patient has a Unstageable pressure ulcer of the sacrum that was present on admission. Query created by: Adriel Kang on 2/17/2021 11:38 AM 
 
 
QUERY TEXT: 
 
Dear Hospitalist Team, Patient admitted with AMS/UTI. Per wound care progress note on 2/16, pt noted to also have a stage 3 mid upper back pressure ulcer. If possible, please document in progress notes and discharge summary the stage of the pressure ulcer: The medical record reflects the following: 
 
Risk Factors: 82 Yr F admitted for AMS/UTI; APS/forensic nursing involved Clinical Indicators: Patient arrived to the ED with c/o AMS and falls. Patient was found to have dried feces with multiple wounds on the body. APS was notified. Wound care saw the patient on 2/16. Per NIHARIKA Prescott progress note, Mid upper back Stage 3 Pressure Injury. Sacrum and mid back wounds: daily, cleanse with wound cleanser spray and wipe clean with 4x4. Apply Silvasorb/Skintegrity wound gel to wounds with gloved finger and cover with a piece of Opticell- AG silver alginate and then a foam dressing. Treatment: Wound care consult, daily wound care and [facility] specialty bed. Thank you, Kena Parra [address] [place] [address]. [place] [id number] Stage 1:  Non-blanchable erythema of intact skin Stage 2:  Abrasion, Blister, Partial-thickness skin loss, with exposed dermis Stage 3:  Full-thickness skin loss with damage or necrosis of subcutaneous tissue Stage 4:  Full-thickness skin & soft tissue loss through to underlying muscle, tendon or bone Unstageable: Obscured full-thickness skin & tissue loss Options provided: 
-- Stage 2 Pressure Ulcer of the mid upper back present on admission -- Stage 3 Pressure Ulcer of the mid upper back present on admission -- Stage 4 Pressure Ulcer of the mid upper back present on admission 
-- Other - I will add my own diagnosis -- Disagree - Not applicable / Not valid -- Disagree - Clinically unable to determine / Unknown 
-- Refer to Clinical Documentation Reviewer PROVIDER RESPONSE TEXT: 
 
This patient has a stage 3 pressure ulcer of the mid upper back which was present on admission. Query created by: Ray Navas on 2/17/2021 11:43 AM 
 
 
QUERY TEXT: 
 
Dear Hospitalist Team, Patient admitted with AMS/UTI. Per wound care progress note on 2/16, pt is noted to also have an unstageable right heel pressure ulcer. If possible, please document in progress notes and discharge summary the stage of the pressure ulcer: The medical record reflects the following: 
 
Risk Factors: 82 Yr F admitted with  AMS/UTI; APS/forensic nursing involved Clinical Indicators: Patient arrived to the ED with c/o AMS and falls. Patient was found to have dried feces with multiple wounds on the body. APS was notified. Wound care saw the patient on 2/16. Per RN Claire Simms progress note, Right Heel Unstageable. Right heel: apply Venelex ointment BID and float both heels off bed using boots or pillows. Treatment: Wound care consultation, daily wound care and [facility] specialty mattress. Thank you, Roxana Saleem [facility] Stage 1:  Non-blanchable erythema of intact skin Stage 2:  Abrasion, Blister, Partial-thickness skin loss, with exposed dermis Stage 3:  Full-thickness skin loss with damage or necrosis of subcutaneous tissue Stage 4:  Full-thickness skin & soft tissue loss through to underlying muscle, tendon or bone Unstageable: Obscured full-thickness skin & tissue loss Options provided: 
-- Deep tissue injury pressure ulcer of the right heel present on admission 
-- Unstageable Pressure Ulcer of the right heel present on admission 
-- Other - I will add my own diagnosis -- Disagree - Not applicable / Not valid -- Disagree - Clinically unable to determine / Unknown 
-- Refer to Clinical Documentation Reviewer PROVIDER RESPONSE TEXT: 
 
This patient has a Unstageable pressure ulcer of the right heel that was present on admission. Query created by:  Kala Bansal on 2/17/2021 11:54 AM 
 
 
Electronically signed by:  Janet Mercado MD 2/17/2021 12:16 PM

## 2021-02-17 NOTE — PROGRESS NOTES
Physical Therapy Received repeat PT eval order. Pt deferred yesterday due to limited command following and inability to yet cooperate with eval. Note now pt is found to have L subcapital hip fx. Will again defer eval and await ortho/medical POC.  
 
Jeff Pineda, PT

## 2021-02-17 NOTE — PROGRESS NOTES
I prayed with Kenna Ray and her daughter and celebrated with Kenna Ray the [address]. Father [place]

## 2021-02-17 NOTE — CONSULTS
Orthopedic CONSULT NOTE Subjective:  
 
Date of Consultation:  February 17, 2021 CHIEF COMPLAINT:  - Arrives from home with new and old stools to bottom down bilat lower extremities. family also reports, pt had no po intake today - family concern for apparent weight loss - note, weight from 6 months ago was noted to be 132 lbs , weight today is noted to be 98 lbs  
  
HISTORY OF PRESENT ILLNESS:   America Hutchins y. o. female  With past medical history of CHF Unknown EF, diabetes, hypertension presenting today with altered mental status. The patient's family says that she was confused today and kept going back to sleep when they tried to wake her up. They note that she did not eat or drink anything today. They state that over the past several when she has had steady decline in her status and has not been eating or drinking very much and has lost weight. She has not had any fevers, cough, chills recently. No nausea or vomiting. They state that she has had a a somewhat steady decline, but today had worsening of her mental status. The patient is unable provide any history due to her altered mental status. Hip xray done in ED shows hip fracture Patient Active Problem List  
 Diagnosis Date Noted  Leukocytosis 02/16/2021  UTI (urinary tract infection) 02/16/2021  Yeast dermatitis 02/16/2021  AMS (altered mental status) 02/16/2021  Iron deficiency anemia 08/09/2016  Type II or unspecified type diabetes mellitus without mention of complication, not stated as uncontrolled 05/30/2012  Pulmonary nodules 04/29/2011  CHF (congestive heart failure) ([facility] [address] 04/15/2011  
 HTN (hypertension), malignant 03/19/2011  DM (diabetes mellitus), type 2, uncontrolled ([address] 03/19/2011  Unspecified pleural effusion 03/19/2011 Family History Problem Relation Age of Onset  Heart Disease Father  Hypertension Sister  Diabetes Sister  Asthma Sister  Diabetes Brother  Hypertension Brother  Hypertension Sister  Heart Disease Sister  Diabetes Brother  Hypertension Brother Social History Tobacco Use  Smoking status: Never Smoker  Smokeless tobacco: Never Used Substance Use Topics  Alcohol use: No  
 
Past Medical History:  
Diagnosis Date  CHF (congestive heart failure) ([facility] [address] 4/15/2011  CHF (congestive heart failure) ([facility] [address] 4/15/2011  Diabetes ([facility] [address]  Diastolic CHF, acute ([facility] [address] 3/19/2011  Hypertension No past surgical history on file. Prior to Admission medications Medication Sig Start Date End Date Taking? Authorizing Provider  
amLODIPine (NORVASC) 5 mg tablet TAKE 1 TABLET EVERY DAY 12/29/20   Jesse Alvarez MD  
atorvastatin (LIPITOR) 10 mg tablet TAKE 1 TABLET EVERY NIGHT 10/11/20   Jesse Alvarez MD  
carvediloL (COREG) 25 mg tablet Take 1 Tab by mouth two (2) times daily (with meals). 10/11/20   Jesse Alvarez MD  
SITagliptin (JANUVIA) 100 mg tablet Take 1 Tab by mouth daily. 1/20/17   Jesse Alvarez MD  
metFORMIN (GLUCOPHAGE) 1,000 mg tablet TAKE 1 TABLET TWICE DAILY 9/28/16   Jesse Alvarez MD  
furosemide (LASIX) 20 mg tablet TAKE 1 TABLET DAILY 8/3/16   Jesse Alvarez MD  
ergocalciferol (ERGOCALCIFEROL) 50,000 unit capsule Take 1 Cap by mouth every seven (7) days. 9/17/15   Jesse Alvarez MD  
glucose blood VI test strips (ACCU-CHEK RISSA PLUS TEST STRP) strip Check glucose twice daily 7/24/15   Jesse Alvarez MD  
alcohol swabs (BD SINGLE USE SWABS REGULAR) padm Daily 10/8/14   Jesse Alvarez MD  
aspirin delayed-release 81 mg tablet Take  by mouth daily. Provider, Historical  
 
Current Facility-Administered Medications Medication Dose Route Frequency  aspirin delayed-release tablet 81 mg  81 mg Oral DAILY  atorvastatin (LIPITOR) tablet 10 mg  10 mg Oral QHS  ergocalciferol capsule 50,000 Units  50,000 Units Oral Q7D  
  sodium chloride (NS) flush 5-40 mL  5-40 mL IntraVENous Q8H  
 sodium chloride (NS) flush 5-40 mL  5-40 mL IntraVENous PRN  
 acetaminophen (TYLENOL) tablet 650 mg  650 mg Oral Q6H PRN Or  
 acetaminophen (TYLENOL) suppository 650 mg  650 mg Rectal Q6H PRN  polyethylene glycol (MIRALAX) packet 17 g  17 g Oral DAILY PRN  promethazine (PHENERGAN) tablet 12.5 mg  12.5 mg Oral Q6H PRN Or  
 ondansetron (ZOFRAN) injection 4 mg  4 mg IntraVENous Q6H PRN  
 0.45% sodium chloride infusion  75 mL/hr IntraVENous CONTINUOUS  
 fluconazole (DIFLUCAN) 200mg/100 mL IVPB (premix)  200 mg IntraVENous Q24H  nystatin (MYCOSTATIN) 100,000 unit/gram powder   Topical BID  cefTRIAXone (ROCEPHIN) 1 g in 0.9% sodium chloride (MBP/ADV) 50 mL MBP  1 g IntraVENous Q24H  
 insulin lispro (HUMALOG) injection   SubCUTAneous AC&HS  
 glucose chewable tablet 16 g  4 Tab Oral PRN  
 dextrose (D50W) injection syrg 12.5-25 g  12.5-25 g IntraVENous PRN  
 glucagon (GLUCAGEN) injection 1 mg  1 mg IntraMUSCular PRN  
 carvediloL (COREG) tablet 12.5 mg  12.5 mg Oral BID WITH MEALS  
 heparin (porcine) injection 5,000 Units  5,000 Units SubCUTAneous Q12H  clindamycin (CLEOCIN) 900mg D5W 50mL IVPB (premix)  900 mg IntraVENous Q8H  
 balsam peru-castor oiL (VENELEX) ointment   Topical BID  miconazole (SECURA) 2 % extra thick cream   Topical BID  thiamine (B-1) 200 mg in 0.9% sodium chloride 50 mL IVPB  200 mg IntraVENous DAILY No Known Allergies Review of Systems:  A comprehensive review of systems was negative except for that written in the HPI. Mental Status: no dementia Objective:  
 
Patient Vitals for the past 8 hrs: 
 BP Temp Pulse Resp SpO2  
21 1120 135/68 97 °F (36.1 °C) 76 15 100 % 21 0726 139/77 97.5 °F (36.4 °C) 69 16 100 % Temp (24hrs), Av °F (36.7 °C), Min:97 °F (36.1 °C), Max:98.7 °F (37.1 °C) EXAM: alert, no distress,  
 Pt. Is TTP over left leg . Leg is short and rotated. .  
 
 
Imaging Review: XRStudy Result EXAM: XR HIP LT W OR WO PELV 2-3 VWS 
  
INDICATION: left hip pain. 
  
COMPARISON: None. 
  
FINDINGS: AP view of the pelvis and a true lateral view of the left hip 
demonstrate severe diffuse osteopenia with complete displaced fracture of 
subcapital left femoral neck. No dislocation or other fracture is shown. Abundant sclerotic calcifications are noted. 
  
IMPRESSION Acute Garden 4 fracture of left femur. Labs:  
Recent Results (from the past 24 hour(s)) GLUCOSE, POC Collection Time: 02/16/21  4:54 PM  
Result Value Ref Range Glucose (POC) 115 (H) 65 - 100 mg/dL Performed by Solitario Rain   
ECHO ADULT COMPLETE Collection Time: 02/16/21  8:43 PM  
Result Value Ref Range IVSd 1.23 (A) 0.6 - 0.9 cm IVSs 1.32 cm LVIDd 3.58 (A) 3.9 - 5.3 cm LVIDs 3.24 cm  
 LVOT d 1.97 cm  
 LVPWd 1.57 (A) 0.6 - 0.9 cm  
 LVPWs 1.52 cm  
 LVOT Peak Gradient 1.69 mmHg LVOT Peak Velocity 64.95 cm/s RVIDd 3.08 cm RVSP 27.14 mmHg Left Atrium Major Axis 2.60 cm Est. RA Pressure 10.00 mmHg Aortic Valve Area by Continuity of Peak Velocity 2.31 cm2 AoV PG 2.96 mmHg Aortic Valve Systolic Peak Velocity 43.69 cm/s  
 MV A Bakari 77.04 cm/s Mitral Valve E Wave Deceleration Time 179.29 ms  
 MV E Bakari 42.60 cm/s  
 E/E' septal 11.27 LV E' Septal Velocity 3.78 cm/s  
 TR Max Velocity 300.91 cm/s Triscuspid Valve Regurgitation Peak Gradient 17.14 mmHg  
 TR Max Velocity 201.35 cm/s Ao Root D 2.74 cm  
 MV E/A 0.55 LV Mass .5 67 - 162 g  
 LV Mass AL Index 120.4 43 - 95 g/m2 Left Atrium Minor Axis 1.75 cm DEBO/BSA Pk Bakari 1.6 cm2/m2 GLUCOSE, POC Collection Time: 02/16/21 11:02 PM  
Result Value Ref Range Glucose (POC) 132 (H) 65 - 100 mg/dL Performed by Harmony Bland GLUCOSE, POC Collection Time: 02/17/21  7:28 AM  
Result Value Ref Range Glucose (POC) 107 (H) 65 - 100 mg/dL Performed by [facility]   
CBC WITH AUTOMATED DIFF Collection Time: 02/17/21 10:54 AM  
Result Value Ref Range WBC 29.6 (H) 3.6 - 11.0 K/uL  
 RBC 3.96 3.80 - 5.20 M/uL  
 HGB 11.5 11.5 - 16.0 g/dL HCT 37.5 35.0 - 47.0 % MCV 94.7 80.0 - 99.0 FL  
 MCH 29.0 26.0 - 34.0 PG  
 MCHC 30.7 30.0 - 36.5 g/dL  
 RDW 15.8 (H) 11.5 - 14.5 % PLATELET 005 136 - 474 K/uL MPV 10.6 8.9 - 12.9 FL  
 NRBC 0.1 (H) 0  WBC ABSOLUTE NRBC 0.02 (H) 0.00 - 0.01 K/uL NEUTROPHILS PENDING % LYMPHOCYTES PENDING % MONOCYTES PENDING % EOSINOPHILS PENDING % BASOPHILS PENDING % IMMATURE GRANULOCYTES PENDING %  
 ABS. NEUTROPHILS PENDING K/UL  
 ABS. LYMPHOCYTES PENDING K/UL  
 ABS. MONOCYTES PENDING K/UL  
 ABS. EOSINOPHILS PENDING K/UL  
 ABS. BASOPHILS PENDING K/UL  
 ABS. IMM. GRANS. PENDING K/UL  
 DF PENDING   
METABOLIC PANEL, COMPREHENSIVE Collection Time: 02/17/21 10:54 AM  
Result Value Ref Range Sodium 154 (H) 136 - 145 mmol/L Potassium 3.9 3.5 - 5.1 mmol/L Chloride 122 (H) 97 - 108 mmol/L  
 CO2 26 21 - 32 mmol/L Anion gap 6 5 - 15 mmol/L Glucose 100 65 - 100 mg/dL BUN 58 (H) 6 - 20 MG/DL Creatinine 1.12 (H) 0.55 - 1.02 MG/DL  
 BUN/Creatinine ratio 52 (H) 12 - 20 GFR est AA 56 (L) >60 ml/min/1.73m2 GFR est non-AA 46 (L) >60 ml/min/1.73m2 Calcium 8.6 8.5 - 10.1 MG/DL Bilirubin, total 0.3 0.2 - 1.0 MG/DL  
 ALT (SGPT) 10 (L) 12 - 78 U/L  
 AST (SGOT) 19 15 - 37 U/L Alk. phosphatase 97 45 - 117 U/L Protein, total 7.1 6.4 - 8.2 g/dL Albumin 1.9 (L) 3.5 - 5.0 g/dL Globulin 5.2 (H) 2.0 - 4.0 g/dL A-G Ratio 0.4 (L) 1.1 - 2.2 MAGNESIUM Collection Time: 02/17/21 10:54 AM  
Result Value Ref Range Magnesium 2.7 (H) 1.6 - 2.4 mg/dL PHOSPHORUS Collection Time: 02/17/21 10:54 AM  
Result Value Ref Range Phosphorus 3.5 2.6 - 4.7 MG/DL  
CK  Collection Time: 02/17/21 10:54 AM  
 Result Value Ref Range  26 - 192 U/L  
GLUCOSE, POC Collection Time: 02/17/21 11:21 AM  
Result Value Ref Range Glucose (POC) 112 (H) 65 - 100 mg/dL Performed by Maria Elena Nick PCT Impression:  
 
Patient Active Problem List  
 Diagnosis Date Noted  Leukocytosis 02/16/2021  UTI (urinary tract infection) 02/16/2021  Yeast dermatitis 02/16/2021  AMS (altered mental status) 02/16/2021  Iron deficiency anemia 08/09/2016  Type II or unspecified type diabetes mellitus without mention of complication, not stated as uncontrolled 05/30/2012  Pulmonary nodules 04/29/2011  CHF (congestive heart failure) ([facility] [address] 04/15/2011  
 HTN (hypertension), malignant 03/19/2011  DM (diabetes mellitus), type 2, uncontrolled ([facility] [address] 03/19/2011  Unspecified pleural effusion 03/19/2011 Active Problems: 
  Leukocytosis (2/16/2021) UTI (urinary tract infection) (2/16/2021) Yeast dermatitis (2/16/2021) AMS (altered mental status) (2/16/2021) Plan:  
admitted to medicine Will need surgical fixation when cleared. NWB Will follow along to see when cleared Dr. Andre Xie aware and agree with above plan.  
 
 
Josse Cabrera PA-C

## 2021-02-18 PROBLEM — I47.29 NSVT (NONSUSTAINED VENTRICULAR TACHYCARDIA): Status: ACTIVE | Noted: 2021-01-01

## 2021-02-18 NOTE — PROGRESS NOTES
Problem: Falls - Risk of 
Goal: *Absence of Falls Description: Document [place] Flow Fall Risk and appropriate interventions in the flowsheet. Outcome: Not Progressing Towards Goal 
Note: Fall Risk Interventions: 
Mobility Interventions: Communicate number of staff needed for ambulation/transfer, Bed/chair exit alarm Mentation Interventions: Adequate sleep, hydration, pain control, Bed/chair exit alarm Medication Interventions: Evaluate medications/consider consulting pharmacy, Patient to call before getting OOB, Teach patient to arise slowly Elimination Interventions: Call light in reach, Bed/chair exit alarm
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
## 2021-02-18 NOTE — PROGRESS NOTES
0715: Bedside, Verbal and Written shift change report given to [address] [place] [address] (oncoming nurse) by Osvaldo Hanley (offgoing nurse). Report included the following information SBAR, Kardex and Cardiac Rhythm NSR.  
 
0835: Tele called, pt had 5 beats of VTACH, will assess strip and notify MD.  
 
0900: Pt unable to swallow PO medications, speech to see pt again. 1000: Speech at bedside, pt unable to swallow pills at this time.

## 2021-02-18 NOTE — PROGRESS NOTES
Problem: Dysphagia (Adult) 
Goal: *Acute Goals and Plan of Care (Insert Text) 
Description: Initiated 2/16/2021 
1. Patient will tolerate sips of water and ice chips free of s/s of aspiration within 7 days. OK for liquids (not just water) 2/17/2021  MET 2/18/2021  
2. Patient will participate in reassessment of swallow function within 7 days.  Advanced to full liquid 2/18/2021  
Outcome: Progressing Towards Goal 
 SPEECH LANGUAGE PATHOLOGY DYSPHAGIA TREATMENT 
Patient: Frances Veloz (85 y.o. female) 
Date: 2/18/2021 
Diagnosis: AMS (altered mental status) [R41.82] 
UTI (urinary tract infection) [N39.0] 
Yeast dermatitis [B37.2] 
Leukocytosis [D72.829] <principal problem not specified> 
    
Precautions:   
 
ASSESSMENT: 
Patient with limited intake and poor interest in PO. Patient agreeable to sips of water and juice, ice cream and attempted pills whole. Patient with inability to clear 2 tiny pills from mouth despite many bites of ice cream and sips of a drink. Patient with slow bolus manipulation and posterior propulsion with suspected delayed swallow but no s/s aspiration. 
  
 
PLAN: 
Recommendations and Planned Interventions: 
Full liquids. Straws ok 
Crush meds 
Suspect intake will be minimal given no interest in PO 
 
Patient continues to benefit from skilled intervention to address the above impairments.  Continue treatment per established plan of care. 
Discharge Recommendations:  Skilled Nursing Facility  
 
SUBJECTIVE:  
Patient stated “they're almost gone”. Re: pills 
 
OBJECTIVE:  
Cognitive and Communication Status: 
Neurologic State: Alert 
Orientation Level: Unable to verbalize(pt would not answer orientation qustions) 
Cognition: Unable to assess (comment)(pt would not speak) 
 
  
Dysphagia Treatment: 
  
P.O. Trials: 
Patient Position: (up in bed) 
Vocal quality prior to P.O.: No impairment 
Consistency Presented: Thin liquid;Pill/Tablet(ice cream) 
How Presented: Straw;SLP-fed/presented;Spoon 
 Bolus Acceptance: Impaired Bolus Formation/Control: Impaired Type of Impairment: Delayed Propulsion: Delayed (# of seconds) Oral Residue: None Initiation of Swallow: Delayed (# of seconds) Laryngeal Elevation: Functional 
Aspiration Signs/Symptoms: None Oral Phase Severity: Moderate-severe Pharyngeal Phase Severity : Mild After treatment:  
Patient left in no apparent distress in bed, Call bell within reach, and Nursing notified COMMUNICATION/EDUCATION:  
 
 
The patient's plan of care including recommendations, planned interventions, and recommended diet changes were discussed with: Registered nurse. Cedric Corbett SLP Time Calculation: 15 mins

## 2021-02-18 NOTE — PROGRESS NOTES
Received notification from bedside RN about patient with regards to: ,  on AM labs VS: /54, HR 69, RR 16, O2 sat 97% on RA Intervention given: Switch IVF to D5 at same rate (50 ml/hour), CMP and CBC in am ordered

## 2021-02-18 NOTE — PROGRESS NOTES
[address]  [phone] Cardiology Progress Note 2/18/2021 1050 AM 
 
Admit Date: 2/15/2021 Admit Diagnosis:  
AMS (altered mental status) [R41.82] UTI (urinary tract infection) [N39.0] Yeast dermatitis [B37.2] Leukocytosis [D72.829] Interval History/Subjective:  
 
Antolin Bowers is a 82 y.o. female with PMH HFrEF, HTN, HLD, DM, pulmonary nodule who was  admitted for AMS (altered mental status) [R41.82] UTI (urinary tract infection) [N39.0] Yeast dermatitis [B37.2] Leukocytosis [D72.829]. -low temp;  BP elevated 
-WBC 25; Hg down to 10 (likely dilutional); Na 160; creat up to 1.23 
-Ms. Emiliana Kirkpatrick states she's feeling okay. She denies any hip pain at this time. No SOB. SLP at bedside to evaluate her. Visit Vitals BP (!) 180/79 (BP 1 Location: Right upper arm, BP Patient Position: Supine) Pulse 79 Temp 98.4 °F (36.9 °C) Resp 14 Ht 5' 6\" (1.676 m) Wt 44.9 kg (98 lb 14.4 oz) SpO2 99% BMI 15.96 kg/m² Current Facility-Administered Medications Medication Dose Route Frequency  dextrose 5% infusion  75 mL/hr IntraVENous CONTINUOUS  
 lactated ringers bolus infusion 500 mL  500 mL IntraVENous ONCE  
 aspirin delayed-release tablet 81 mg  81 mg Oral DAILY  atorvastatin (LIPITOR) tablet 10 mg  10 mg Oral QHS  ergocalciferol capsule 50,000 Units  50,000 Units Oral Q7D  
 sodium chloride (NS) flush 5-40 mL  5-40 mL IntraVENous Q8H  
 sodium chloride (NS) flush 5-40 mL  5-40 mL IntraVENous PRN  
 acetaminophen (TYLENOL) tablet 650 mg  650 mg Oral Q6H PRN Or  
 acetaminophen (TYLENOL) suppository 650 mg  650 mg Rectal Q6H PRN  polyethylene glycol (MIRALAX) packet 17 g  17 g Oral DAILY PRN  promethazine (PHENERGAN) tablet 12.5 mg  12.5 mg Oral Q6H PRN Or  
 ondansetron (ZOFRAN) injection 4 mg  4 mg IntraVENous Q6H PRN  
 fluconazole (DIFLUCAN) 200mg/100 mL IVPB (premix)  200 mg IntraVENous Q24H  nystatin (MYCOSTATIN) 100,000 unit/gram powder   Topical BID  cefTRIAXone (ROCEPHIN) 1 g in 0.9% sodium chloride (MBP/ADV) 50 mL MBP  1 g IntraVENous Q24H  
 insulin lispro (HUMALOG) injection   SubCUTAneous AC&HS  
 glucose chewable tablet 16 g  4 Tab Oral PRN  
 dextrose (D50W) injection syrg 12.5-25 g  12.5-25 g IntraVENous PRN  
 glucagon (GLUCAGEN) injection 1 mg  1 mg IntraMUSCular PRN  
 carvediloL (COREG) tablet 12.5 mg  12.5 mg Oral BID WITH MEALS  
 heparin (porcine) injection 5,000 Units  5,000 Units SubCUTAneous Q12H  clindamycin (CLEOCIN) 900mg D5W 50mL IVPB (premix)  900 mg IntraVENous Q8H  
 balsam peru-castor oiL (VENELEX) ointment   Topical BID  miconazole (SECURA) 2 % extra thick cream   Topical BID  thiamine (B-1) 200 mg in 0.9% sodium chloride 50 mL IVPB  200 mg IntraVENous DAILY Objective:  
  
Physical Exam: 
General: pleasant, cachectic, elderly AAF resting in bed in NAD Heart: RRR, no m/S3/JVD Lungs: clear Abdomen: Soft, +BS, NTND Extremities: LE bertha +DP/PT, no edema. LLE externally rotated Neurologic: Grossly normal;  Disoriented to place 
  
 
Data Review:  
Recent Labs  
  02/18/21 
0036 02/17/21 
1054 02/16/21 
0201 WBC 25.1* 29.6* 34.1* HGB 10.0* 11.5 11.6 HCT 32.8* 37.5 37.7  365 390 Recent Labs  
  02/18/21 
0036 02/17/21 
1054 02/16/21 
0201 02/15/21 
2156 * 154* 156* 154* K 3.7 3.9 4.3 4.6 * 122* 124* 119* CO2 24 26 24 25 * 100 167* 154* BUN 59* 58* 64* 70* CREA 1.23* 1.12* 1.41* 1.65* CA 8.1* 8.6 8.4* 8.7 MG 2.4 2.7* 2.8*  --   
PHOS 3.8 3.5  --   --   
ALB  --  1.9* 2.0* 2.1* TBILI  --  0.3 0.6 0.6 ALT  --  10* 9* 7* Recent Labs  
  02/17/21 
1054 02/15/21 
2156 TROIQ  --  <0.05  
  -- No intake or output data in the 24 hours ending 02/18/21 1105 Telemetry: SR with PVCs and 2 NSVT 
ECG: NSR;  ST and T wave abnormality - no significant changes from EKG in 2014 Echocardiogram: EF 30-35% and multiple areas of hypokinesis 
CXRAY: n/a 
 
Assessment:  
 
Active Problems: 
  Leukocytosis (2/16/2021) 
 
  UTI (urinary tract infection) (2/16/2021) 
 
  Yeast dermatitis (2/16/2021) 
 
  AMS (altered mental status) (2/16/2021) 
 
  Hip fracture (HCC) (2/17/2021) 
 
  Failure to thrive in adult (2/17/2021) 
 
  NICM (nonischemic cardiomyopathy) (HCC) (2/17/2021) 
 
 
 
Plan:  
 
Chronic HFrEF/NICM:  EF 30-35% here, which is an increase from prior.  No s/s fluid overload.   
· Having some bursts of NSVT - which may be directly related to not getting her BB due to holding of PO meds.  If SLP determines patient not safe for PO, she needs IV BB.   
· Continue BB (either PO or IV).  Add ACEi/ARB after surgery if renal function stays stable 
· Continue to monitor for s/s of fluid overload with the necessary fluid resuscitation.  
 
 
CAD history:  No cardiac complaints.  Troponin negative.  
· ASA, statin, BB if/when able to take PO.  Needs IV BB if unable to take PO 
 
 
No additional cardiology work up needed prior to necessary hip surgery.  Will be at a higher risk due to her low EF.  Will continue to follow closely paige-op.   
 
 
Milagros Church NP 
DNP, RN, AGACNP-BC

## 2021-02-18 NOTE — PROGRESS NOTES
Orthopedic NP Progress Note Post Op day: * No surgery found * February 18, 2021 1:24 PM  
 
Zonia Dubois Attending Physician: Treatment Team: Attending Provider: Marciano De MD; Care Manager: Ena Rivers RN; Consulting Provider: Arturo Recinos; Consulting Provider: James Munroe MD; Utilization Review: Jaxon Lauren RN; Consulting Provider: Minna Cisneros MD; Care Manager: Adelle Opitz Vital Signs:   
Patient Vitals for the past 8 hrs: 
 BP Temp Pulse Resp SpO2  
02/18/21 1034 (!) 180/79 98.4 °F (36.9 °C) 79 14 99 % 02/18/21 0844 (!) 158/69 97.9 °F (36.6 °C) 78 12 100 % BMI (calculated): 16 (02/15/21 2144) Intake/Output: 
No intake/output data recorded. No intake/output data recorded. Pain Control:  
Pain Assessment Pain Scale 1: Numeric (0 - 10) Pain Intensity 1: 6 LAB:   
Recent Labs  
  02/18/21 
0036 HCT 32.8* HGB 10.0* Lab Results Component Value Date/Time Sodium 160 (H) 02/18/2021 12:36 AM  
 Potassium 3.7 02/18/2021 12:36 AM  
 Chloride 130 (H) 02/18/2021 12:36 AM  
 CO2 24 02/18/2021 12:36 AM  
 Glucose 113 (H) 02/18/2021 12:36 AM  
 BUN 59 (H) 02/18/2021 12:36 AM  
 Creatinine 1.23 (H) 02/18/2021 12:36 AM  
 Calcium 8.1 (L) 02/18/2021 12:36 AM  
 
 
Subjective:  Zonia Dubois is a 82 y.o. female with L hip fx - surgery awaiting medical clearance. Pt is resting in bed and pain is well managed Objective: General: alert, cooperative, no distress. Neuro/Vascular: CNS Intact. Sensation stable. Brisk cap refill, 2+ pulses UE/LE Musculoskeletal:  +ROM UE with limited ROM LE. Skin:warm and dry - sacral wounds noted Marks - n 
  
 
PT/OT:  
Gait:    
            
 
 
Assessment:  
 s/p Active Problems: 
  Leukocytosis (2/16/2021) UTI (urinary tract infection) (2/16/2021) Yeast dermatitis (2/16/2021) AMS (altered mental status) (2/16/2021) Hip fracture ([facility] [address] (2/17/2021) Failure to thrive in adult (2/17/2021) NICM (nonischemic cardiomyopathy) ([facility] [address] (2/17/2021) NSVT (nonsustained ventricular tachycardia) ([facility] [address] (2/18/2021) Plan:  
-  Nurse to place unger which will help keep wounds clean  
-  Continue established methods of pain control 
-  VTE Prophylaxes - TEDS &/or SCDs with ASA and heparin until surgery -  Will plan for surgery once WBC is decreased and attending fell she is medically optimized, cardiology has seen and cleared Signed By: Christian Beal NP Orthopedic Nurse Practitioner

## 2021-02-18 NOTE — PROGRESS NOTES
Physical Therapy Pt not interacting, resistant to turning/changing, still awaiting medical stability and management of femur fx. Will sign off at this time. Please re-consult post surgery if appropriate when/if pt is able to participate in therapy Jordon Ramsey, PT

## 2021-02-18 NOTE — PROGRESS NOTES
Transition of Care Plan: 
  
Disposition: Likely SNF - will need insurance auth and COVID test completed within 72 hrs of d/c Follow up appointments: PCP, Ortho Transportation at Discharge: BLS via [facility]  
DME needed: TBD by SNF IM Medicare letter: 2nd IM letter needed Caregiver Contact: spouse, Rian Dodge [phone] UPDATE 2:44 PM - CM spoke with pt's spouse, Rian Dodge, regarding d/c planning. CM explained that following surgery pt will be evaluated by PT/OT to assist with an appropriate d/c plan although it is likely pt will benefit from from a rehab setting prior to returning home. Spouse is in agreement and gave CM permission to send a referral to [facility] (chosen for proximity to pt's home). Verbal FOC obtained and form placed on chart. Referral sent to [facility] via [facility]. CM offered to call the daughter to provide update however spouse declined. Per RN, pt will likely be transferred to Neurology this afternoon. Initial Note 10:22 AM - Chart reviewed. Ortho following and noted pt will need surgical repair of femur fracture. Pt currently NWB and unable to participate with therapy. It is highly likely pt will require SNF upon d/c - CM will discuss with family once pt has been cleared to work with PT/OT. CM will continue to follow. Stoney Lama LMSW Care Manager [facility] 
[phone]

## 2021-02-18 NOTE — PROGRESS NOTES
End of Shift Note Bedside shift change report given to Farnaz Clancy (oncoming nurse) by Brooke Gonsales (offgoing nurse). Report included the following information SBAR, Kardex, Intake/Output, MAR and Recent Results Shift worked:  [id number] Shift summary and any significant changes: No major changes, pt not interactive and resistant when turning/changing. Concerns for physician to address:  none Zone phone for oncoming shift:    
  
 
Activity: 
Activity Level: Bed Rest 
Number times ambulated in hallways past shift: 0 Number of times OOB to chair past shift: 0 Cardiac:  
Cardiac Monitoring: Yes     
Cardiac Rhythm: Normal sinus rhythm Access:  
Current line(s): PIV Genitourinary:  
Urinary status: voiding and incontinent Respiratory:  
O2 Device: Room air Chronic home O2 use?: NO Incentive spirometer at bedside: NO 
  
GI: 
Last Bowel Movement Date: 02/16/21 Current diet:  DIET CLEAR LIQUID 
DIET NUTRITIONAL SUPPLEMENTS Breakfast, Lunch, Dinner; Ensure Clear DIET ONE TIME MESSAGE Passing flatus: YES Tolerating current diet: YES Pain Management:  
Patient states pain is manageable on current regimen: N/A Skin: 
Jake Score: 10 Interventions: float heels Patient Safety: 
Fall Score: Total Score: 3 Interventions: gripper socks High Fall Risk: Yes Length of Stay: 
Expected LOS: 3d 19h Actual LOS: 2 Brooke Gonsales
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
## 2021-02-18 NOTE — PROGRESS NOTES
Occupational Therapy note: 
 
Pt not interacting, resistant to turning/changing, still awaiting medical stability and management of femur fx. Will sign off at this time. Please re-consult post surgery if appropriate when/if pt is able to participate in therapy LUCIAN Bell/L

## 2021-02-19 NOTE — PROGRESS NOTES
End of Shift Note Bedside shift change report given to Arthur Alvarez (oncoming nurse) by Kesha Fall (offgoing nurse). Report included the following information SBAR, Kardex, Intake/Output, MAR and Recent Results Shift worked:  [id number] Shift summary and any significant changes:  
  Pt still refusing oral meds. Combative when trying to turn to be changed. Marks insertion unsuccessful. Concerns for physician to address:  none Zone phone for oncoming shift:    
  
 
Activity: 
Activity Level: Bed Rest 
Number times ambulated in hallways past shift: 0 Number of times OOB to chair past shift: 0 Cardiac:  
Cardiac Monitoring: Yes     
Cardiac Rhythm: AVB- 1st degree Access:  
Current line(s): PIV Genitourinary:  
Urinary status: voiding and incontinent Respiratory:  
O2 Device: Room air Chronic home O2 use?: NO Incentive spirometer at bedside: NO 
  
GI: 
Last Bowel Movement Date: 02/16/21 Current diet:  DIET NUTRITIONAL SUPPLEMENTS Breakfast, Lunch, Dinner; Ensure Clear DIET ONE TIME MESSAGE 
DIET FULL LIQUID Passing flatus: YES Tolerating current diet: YES Pain Management:  
Patient states pain is manageable on current regimen: N/A Skin: 
Jake Score: 9 Interventions: float heels Patient Safety: 
Fall Score: Total Score: 3 Interventions: gripper socks High Fall Risk: Yes Length of Stay: 
Expected LOS: 3d 19h Actual LOS: 3 Kesha Fall

## 2021-02-19 NOTE — PROGRESS NOTES
Speech Pathology:  Attempted to see patient for dysphagia treatment however patient declined all PO trials stating \"I'm not going to eat now, my  is already fixing dinner\" and \"I'll eat a little something when I get hungry\". Dysphagia treatment deferred. SLP to continue to follow. Thank you.  
 
Angelia Silva, SLP

## 2021-02-19 NOTE — ANESTHESIA PREPROCEDURE EVALUATION
Relevant Problems CARDIOVASCULAR  
(+) CHF (congestive heart failure) (HCC)  
(+) HTN (hypertension), malignant ENDOCRINE  
(+) Type II or unspecified type diabetes mellitus without mention of complication, not stated as uncontrolled HEMATOLOGY  
(+) Iron deficiency anemia Anesthetic History No history of anesthetic complications Review of Systems / Medical History Patient summary reviewed, nursing notes reviewed and pertinent labs reviewed Pulmonary Comments: Known Hx of L pleural effusion and pulmonary nodules Neuro/Psych Dementia Cardiovascular Hypertension CHF Dysrhythmias : SVT Exercise tolerance: <4 METS Comments: ECHO 2 days ago showed a 30-35% EF with LV diastolic dysfunction and trace MR and TR 
NSVT (nonsustained ventricular tachycardia) GI/Hepatic/Renal 
  
 
 
Renal disease: CRI Endo/Other Diabetes: well controlled, type 2, using insulin Anemia Other Findings Comments: hypokalemia Dehydration Physical Exam 
 
Airway Mallampati: III Neck ROM: normal range of motion Mouth opening: Normal 
 
 Cardiovascular Regular rate and rhythm,  S1 and S2 normal,  no murmur, click, rub, or gallop Dental 
 
Dentition: Poor dentition Comments: Multiple missing teeth Pulmonary Breath sounds clear to auscultation Abdominal 
GI exam deferred Other Findings Anesthetic Plan ASA: 3 Anesthesia type: general 
 
Monitoring Plan: BIS Induction: Intravenous Consider a-line and central access

## 2021-02-19 NOTE — PROGRESS NOTES
Comprehensive Nutrition Assessment Type and Reason for Visit: Goldie Weaver Nutrition Recommendations/Plan:  
Continue current diet Continue ensure clear TID Add magic cup and ensure pudding daily Nutrition Assessment:    
Chart reviewed; medically noted for AMS, hip fracture, FTT, CM, and BRO. PMH HTN, DM, and CHF. Patient sleeping at time of 2 attempted visits today; no family at bedside either time. Breakfast untouched this morning, ensure clear open with straw in it but it was still full. Lunch untouched at time of second visit; ensure clear unopened. Refused medications per notes. Noted plans for surgery for hip fracture when medically optimized. Patient with severe malnutrition. Will add additional ONS for her to try. Consider palliative care consult for goals of care discussion given poor intake/refusal of PO. Malnutrition Assessment: 
Malnutrition Status:  Severe malnutrition Context:  Acute illness Findings of the 6 clinical characteristics of malnutrition:  
Energy Intake:  7 - 50% or less of est energy requirements for 5 or more days Weight Loss:  7.00 - Greater than 10% over 6 months Body Fat Loss:  7 - Moderate body fat loss, Muscle Mass Loss:  7 - Moderate muscle mass loss, Fluid Accumulation:  No significant fluid accumulation,   
 Strength:  Not performed Estimated Daily Nutrient Needs: 
Energy (kcal): 1406 kcal ( x 1.2AF +300kcal); Weight Used for Energy Requirements: Current Protein (g): 68-77g (1.5-1.7 g/kg bw); Weight Used for Protein Requirements: Current Fluid (ml/day): 1400 mL; Method Used for Fluid Requirements: 1 ml/kcal 
 
Nutrition Related Findings:      
BM 2/16 Na 160, -[phone] Atorvastatin, humalog, Lopressor, Thiamine, D5% IVF Wounds:   
Unstageable, Stage III, Deep tissue injury Current Nutrition Therapies: DIET NUTRITIONAL SUPPLEMENTS Breakfast, Lunch, Dinner; [employer] DIET ONE TIME MESSAGE 
DIET FULL LIQUID 
 
 Anthropometric Measures: 
· Height:  5' 6\" (167.6 cm) · Current Body Wt:  45 kg (99 lb 3.3 oz) · BMI Category:  Underweight (BMI less than 18.5) Nutrition Diagnosis: · Severe malnutrition related to cognitive or neurological impairment, inadequate protein-energy intake as evidenced by weight loss greater than or equal to 10% in 6 months, severe muscle loss, severe loss of subcutaneous fat Nutrition Interventions:  
Food and/or Nutrient Delivery: Continue current diet, Modify oral nutrition supplement Nutrition Education and Counseling: No recommendations at this time Coordination of Nutrition Care: Continue to monitor while inpatient, Speech therapy, Interdisciplinary rounds Goals: 
PO intake at least 50% of meals/supplements next 2-4 days Nutrition Monitoring and Evaluation:  
Behavioral-Environmental Outcomes: None identified Food/Nutrient Intake Outcomes: Food and nutrient intake, Supplement intake, Diet advancement/tolerance Physical Signs/Symptoms Outcomes: Biochemical data, Chewing or swallowing, Hemodynamic status, Weight, Skin Discharge Planning: Too soon to determine Electronically signed by Tyree Ytaes RD on 2/19/2021 at 1:06 PM 
 
Contact: ext [phone]

## 2021-02-19 NOTE — PROGRESS NOTES
[address] [place] [address], [address] [facility]  [phone] Cardiology Progress Note 2/19/2021 1230PM 
 
Admit Date: 2/15/2021 Admit Diagnosis:  
AMS (altered mental status) [R41.82] UTI (urinary tract infection) [N39.0] Yeast dermatitis [B37.2] Leukocytosis [D72.829] Interval History/Subjective:  
 
Lacey Callahan is a 82 y.o. female with PMH HFrEF, HTN, HLD, DM, pulmonary nodule who was  admitted for AMS (altered mental status) [R41.82] UTI (urinary tract infection) [N39.0] Yeast dermatitis [B37.2] Leukocytosis [D72.829]. -BP elevated 
-no labs 
-Ms. eZlda Gurrola states she's feeling okay. She denies any hip pain at this time. No SOB. Visit Vitals BP (!) 150/77 (BP 1 Location: Left upper arm, BP Patient Position: At rest) Pulse 71 Temp 97 °F (36.1 °C) Resp 14 Ht 5' 6\" (1.676 m) Wt 45 kg (99 lb 3.3 oz) SpO2 98% BMI 16.01 kg/m² Current Facility-Administered Medications Medication Dose Route Frequency  metoprolol (LOPRESSOR) injection 2.5 mg  2.5 mg IntraVENous Q6H  
 dextrose 5% infusion  75 mL/hr IntraVENous CONTINUOUS  
 HYDROmorphone (PF) (DILAUDID) injection 0.2 mg  0.2 mg IntraVENous Q2H PRN  
 aspirin delayed-release tablet 81 mg  81 mg Oral DAILY  atorvastatin (LIPITOR) tablet 10 mg  10 mg Oral QHS  ergocalciferol capsule 50,000 Units  50,000 Units Oral Q7D  
 sodium chloride (NS) flush 5-40 mL  5-40 mL IntraVENous Q8H  
 sodium chloride (NS) flush 5-40 mL  5-40 mL IntraVENous PRN  
 acetaminophen (TYLENOL) tablet 650 mg  650 mg Oral Q6H PRN Or  
 acetaminophen (TYLENOL) suppository 650 mg  650 mg Rectal Q6H PRN  polyethylene glycol (MIRALAX) packet 17 g  17 g Oral DAILY PRN  promethazine (PHENERGAN) tablet 12.5 mg  12.5 mg Oral Q6H PRN Or  
 ondansetron (ZOFRAN) injection 4 mg  4 mg IntraVENous Q6H PRN  
 fluconazole (DIFLUCAN) 200mg/100 mL IVPB (premix)  200 mg IntraVENous Q24H  nystatin (MYCOSTATIN) 100,000 unit/gram powder   Topical BID  cefTRIAXone (ROCEPHIN) 1 g in 0.9% sodium chloride (MBP/ADV) 50 mL MBP  1 g IntraVENous Q24H  
 insulin lispro (HUMALOG) injection   SubCUTAneous AC&HS  
 glucose chewable tablet 16 g  4 Tab Oral PRN  
 dextrose (D50W) injection syrg 12.5-25 g  12.5-25 g IntraVENous PRN  
 glucagon (GLUCAGEN) injection 1 mg  1 mg IntraMUSCular PRN  
 [Held by provider] carvediloL (COREG) tablet 12.5 mg  12.5 mg Oral BID WITH MEALS  
 heparin (porcine) injection 5,000 Units  5,000 Units SubCUTAneous Q12H  clindamycin (CLEOCIN) 900mg D5W 50mL IVPB (premix)  900 mg IntraVENous Q8H  
 balsam peru-castor oiL (VENELEX) ointment   Topical BID  miconazole (SECURA) 2 % extra thick cream   Topical BID  thiamine (B-1) 200 mg in 0.9% sodium chloride 50 mL IVPB  200 mg IntraVENous DAILY Objective:  
  
Physical Exam: 
General: pleasant, cachectic, elderly AAF resting in bed in NAD Heart: RRR, no m/S3/JVD Lungs: clear/dim Abdomen: Soft, +BS, NTND Extremities: LE bertha +DP/PT, no edema. LLE externally rotated Neurologic: Grossly normal;  Disoriented to place 
  
 
Data Review:  
Recent Labs  
  02/18/21 
0036 02/17/21 
1054 WBC 25.1* 29.6* HGB 10.0* 11.5 HCT 32.8* 37.5  365 Recent Labs  
  02/18/21 
0036 02/17/21 
1054 * 154* K 3.7 3.9 * 122* CO2 24 26 * 100 BUN 59* 58* CREA 1.23* 1.12* CA 8.1* 8.6 MG 2.4 2.7* PHOS 3.8 3.5 ALB  --  1.9* TBILI  --  0.3 ALT  --  10* Recent Labs  
  02/17/21 
1054  No intake or output data in the 24 hours ending 02/19/21 1247 Telemetry: SR with PVCs ECG: NSR;  ST and T wave abnormality - no significant changes from EKG in 2014 Echocardiogram: EF 30-35% and multiple areas of hypokinesis CXRAY: n/a Assessment:  
 
Active Problems: 
  Leukocytosis (2/16/2021) UTI (urinary tract infection) (2/16/2021) Yeast dermatitis (2/16/2021) AMS (altered mental status) (2/16/2021) Hip fracture ([facility] (2/17/2021) Failure to thrive in adult (2/17/2021) NICM (nonischemic cardiomyopathy) ([facility] (2/17/2021) NSVT (nonsustained ventricular tachycardia) ([facility] (2/18/2021) Plan:  
 
Chronic HFrEF/NICM:  EF 30-35% here, which is an increase from prior. No s/s fluid overload. · Less PVCs/NSVT - Has not been getting her BB due to refusing PO meds. Added IV BB this morning · Continue BB (either PO or IV). Add ACEi/ARB after surgery if renal function stays stable · Continue to monitor for s/s of fluid overload with the necessary fluid resuscitation. CAD history:  No cardiac complaints. Troponin negative. · ASA, statin, BB if/when able to take PO. No additional cardiology work up needed prior to necessary hip surgery. Will be at a higher risk due to her low EF. Will continue to follow closely paige-op. Roselyn Flores NP 
DNP, RN, AGACNP-BC [facility] 2/19/2021 Patient seen, examined by me personally. Plan discussed as detailed. Agree with note as outlined by  NP. I confirm findings in history and physical exam. No additional findings noted. Agree with plan as outlined above.   
 
Robert Fajardo MD

## 2021-02-19 NOTE — PERIOP NOTES
Case canceled today will be rescheduled tomorrow by Dr Araiza. Notified Ayo Sayer of situation (will have procedure tomorrow and keep pt NPO tomorrow.)
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
## 2021-02-19 NOTE — PROGRESS NOTES
Tele tech called and stated patient had a 7 beat run of v tach. Into patients room to assess. Pt resting quietly in bed, arousable to voice, asymptomatic. VSS as documented. Doctor Khari made aware, no new orders. Will continue to monitor.

## 2021-02-19 NOTE — ACP (ADVANCE CARE PLANNING)
. 
                                      
 
Advance Care Planning Note NAME: Tim Johansen :  1935 MRN:  [id number] Date/Time:  2021 2:14 PM 
 
Active Diagnoses: 
Hospital Problems  Date Reviewed: 2020 Codes Class Noted POA  
 NSVT (nonsustained ventricular tachycardia) (HCC) ICD-10-CM: I47.2 ICD-9-CM: 427.1  2021 Unknown Hip fracture ([facility] ICD-10-CM: Z23.768G ICD-9-CM: 820.8  2021 Yes Failure to thrive in adult ICD-10-CM: R62.7 ICD-9-CM: 783.7  2021 Yes NICM (nonischemic cardiomyopathy) ([facility] ICD-10-CM: I42.8 ICD-9-CM: 425.4  2021 Yes Leukocytosis ICD-10-CM: L23.027 ICD-9-CM: 288.60  2021 Unknown UTI (urinary tract infection) ICD-10-CM: N39.0 ICD-9-CM: 599.0  2021 Unknown Yeast dermatitis ICD-10-CM: B37.2 ICD-9-CM: 112.3  2021 Unknown AMS (altered mental status) ICD-10-CM: H81.36 
ICD-9-CM: 780.97  2021 Unknown These active diagnoses are of sufficient risk that focused discussion on advance care planning is indicated in order to allow the patient to thoughtfully consider personal goals of care, and if situations arise that prevent the ability to personally give input, to ensure appropriate representation of their personal desires for different levels and aggressiveness of care. Discussion:  
Code status addressed and wants to be a DNR / DNI. Patient DOES NOT want a central line and vasopressors if needed. Patient would also NOT want a feeding tube, if needed, for nutritional support. This discussion has taken place with Mr. Joyceann Carrel her . Which she agreed with to be her decision maker. Persons present and participating in discussion: Anthony Celestin MD, Mr. Joyceann Carrel Time Spent:  
Total time spent face-to-face in education and discussion:   18  minutes.   
 
 
 
Jodie Bland MD  
Hospitalist
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
## 2021-02-20 NOTE — PROGRESS NOTES
RAPID RESPONSE TEAM 
 
Responded to overhead rapid response to 3267 Patient recently returned from [address] [place] [address] for LEFT  HIP LUCIANA ARTHROPLASTY. Patient hypothermic and hypotensive despite 500cc NS bolus and on dwight hugger. 30cc UO in past 3 hours. Patient on damian gtt during surgery. Discussed with Dr. Barry Mclain. Orders received to initiate damian gtt to maintain map >65 and transfer to stepdown. Nursing sup notified. Patient Vitals for the past 4 hrs: 
 Temp Pulse Resp BP SpO2  
02/20/21 1737  82  (!) 99/55 99 % 02/20/21 1730 (!) 95.3 °F (35.2 °C) 80 18 (!) 80/50 99 % 02/20/21 1715  80  (!) 83/53 99 % 02/20/21 1641 (!) 94.4 °F (34.7 °C) 82  (!) 97/52 100 % 02/20/21 1627  81  (!) 74/60 100 % 02/20/21 1600  84  105/61 98 % 02/20/21 1557  85  119/62 100 % 02/20/21 1553 (!) 95.2 °F (35.1 °C)   109/64   
02/20/21 1546 (!) 94.4 °F (34.7 °C) 83  (!) 80/50 100 % 02/20/21 1543  69 16 (!) 88/54 100 % 02/20/21 1515 97.3 °F (36.3 °C) 80 14 (!) 90/51   
02/20/21 1500 97.5 °F (36.4 °C) 79 12 (!) 91/40 97 % 02/20/21 1445  80 13 102/70 98 % 02/20/21 1430  79 15 (!) 101/54 98 % 02/20/21 1425  79 12 104/60 99 % 02/20/21 1420  80 11 110/64 100 % 02/20/21 1415  79 11 113/64 100 % Patient transferring to stepdown. Ana Luisa Richard, NIHARIKA 
RRT, [id number]

## 2021-02-20 NOTE — PROGRESS NOTES
1553 Rapid Response called after pt received from OR. Unable to obtain BP from multiple sites/tries. Extremities cold and subnormal temp. Aron bay applied. 1600 Dr. Sheila Ashby at bedside. Small bolus adm~100 ml, but cancelled d/t hx of CHF. 1630 TC to Rapid Response nurse, bolus 500ml NS begun. Kaykay arrives to check on pt 
 
1730 Bolus completed. 1800 Kaykay again contacted d/t low UOP, low BP. New orders from Dr. Cheryl Bethea. 1810 NS bolus stopped-received 150ml. Pt to be transferred. Kaykay here assessing pt. 
 
1845 Pt transferred to  2261 with cardiac monitor per protocol. Report to Justyn Guardado RN. Updated on SBAR, MAR, I & O, Recent results, KARDEX and events of the day and reason for transfer. Immediately prior to transfer, VS: [id number], 83/48. [id number]=54%. Pt remains responsive to name and follows simple commands.

## 2021-02-20 NOTE — PROGRESS NOTES
Received notification from bedside RN about patient with regards to: requesting arterial stick for blood draw, patient a difficult stick Intervention given: arterial stick x 1 for blood draw ordered

## 2021-02-20 NOTE — ANESTHESIA POSTPROCEDURE EVALUATION
Procedure(s): LEFT  HIP LUCIANA ARTHROPLASTY. general 
 
Anesthesia Post Evaluation Patient location during evaluation: PACU Note status: Adequate. Level of consciousness: responsive to verbal stimuli and sleepy but conscious Pain management: satisfactory to patient Airway patency: patent Anesthetic complications: no 
Cardiovascular status: acceptable Respiratory status: acceptable Hydration status: acceptable Comments: +Post-Anesthesia Evaluation and Assessment Patient: Vivek Riley MRN: [id number]  SSN: [id number] YOB: 1935  Age: 82 y.o. Sex: female Cardiovascular Function/Vital Signs BP (!) 101/54   Pulse 79   Temp 36.6 °C (97.8 °F)   Resp 10   Ht 5' 6\" (1.676 m)   Wt 44.8 kg (98 lb 12.3 oz)   SpO2 98%   BMI 15.94 kg/m² Patient is status post Procedure(s): LEFT  HIP LUCIANA ARTHROPLASTY. Nausea/Vomiting: Controlled. Postoperative hydration reviewed and adequate. Pain: 
Pain Scale 1: FLACC (02/20/21 1430) Pain Intensity 1: 0 (02/20/21 1430) Managed. Neurological Status:  
Neuro (WDL): Exceptions to WDL (02/20/21 1411) At baseline. Mental Status and Level of Consciousness: Arousable. Pulmonary Status:  
O2 Device: Room air (02/20/21 1430) Adequate oxygenation and airway patent. Complications related to anesthesia: None Post-anesthesia assessment completed. No concerns. Signed By: Jennifer Randall MD  
 2/20/2021 Post anesthesia nausea and vomiting:  controlled INITIAL Post-op Vital signs:  
Vitals Value Taken Time /54 02/20/21 1430 Temp 36.6 °C (97.8 °F) 02/20/21 1411 Pulse 80 02/20/21 1444 Resp 12 02/20/21 1444 SpO2 98 % 02/20/21 1444 Vitals shown include unvalidated device data.

## 2021-02-20 NOTE — BRIEF OP NOTE
Brief Postoperative Note Patient: Yovany Troncoso YOB: 1935 MRN: [id number] Date of Procedure: 2/20/2021 Pre-Op Diagnosis: LEFT HIP FRACTURE Post-Op Diagnosis: Same as preoperative diagnosis. Procedure(s): LEFT  HIP LUCIANA ARTHROPLASTY Surgeon(s): Geo Husain MD 
 
Surgical Assistant: Surg Asst-1: Jorge Holly Anesthesia: Other Estimated Blood Loss (mL): Minimal 
 
Complications: None Specimens: * No specimens in log * Implants:  
Implant Name Type Inv. Item Serial No.  Lot No. LRB No. Used Action STEM FEM SZ 3 132D [id number] -- ACCOLADE II V40 - SNA  STEM FEM SZ 3 132D [id number] -- ACCOLADE II V40 NA [employer] [facility] [id number] Left 1 Implanted HEAD FEM OD46MM ID28MM UNIV CO CHROM COMPHSVE SZ ARRY FOR - SNA  HEAD FEM OD46MM ID28MM UNIV CO CHROM COMPHSVE SZ ARRY FOR NA [employer] [facility] [id number] Left 1 Implanted HEAD FEM [id number] +4MM OFFSET HIP CO CHROM POLYETH TAPR LO - SNA  HEAD FEM [id number] +4MM OFFSET HIP CO CHROM POLYETH TAPR LO NA [employer] [facility] [id number] Left 1 Implanted Drains: * No LDAs found * Findings: as above Electronically Signed by Aditya Gee MD on 2/20/2021 at 1:57 PM

## 2021-02-20 NOTE — PERIOP NOTES
TRANSFER - IN REPORT: 
 
Verbal report received from Jose Stovall RN(name) on Calos Ortiz  being received from 3265(unit) for ordered procedure Report consisted of patients Situation, Background, Assessment and  
Recommendations(SBAR). Information from the following report(s) SBAR, Kardex, ED Summary, OR Summary, Procedure Summary, Intake/Output, MAR, Accordion, Recent Results, Med Rec Status, Cardiac Rhythm NSR, Alarm Parameters , Pre Procedure Checklist, Procedure Verification and Quality Measures was reviewed with the receiving nurse. Opportunity for questions and clarification was provided. Assessment completed upon patients arrival to unit and care assumed.

## 2021-02-20 NOTE — PERIOP NOTES
Handoff Report from Operating Room to PACU Report received from DARNELL Serrano CRNA and EULALIA Spencer RN  regarding Vahe Angulo. Surgeon(s): Cory Garner MD  And Procedure(s) (LRB): LEFT  HIP LUCIANA ARTHROPLASTY (Left)  confirmed  
with allergies and dressings discussed. Anesthesia type, drugs, patient history, complications, estimated blood loss, vital signs, intake and output, and last pain medication, lines, reversal medications and temperature were reviewed. Dr. Ellene Schwab called patient's  and provided him with update following surgery. TRANSFER - OUT REPORT: 
 
Verbal report given to Carter Tomas RN (name) on Vahe Angulo  being transferred to [facility] (unit) for routine post - op Report consisted of patients Situation, Background, Assessment and  
Recommendations(SBAR). Information from the following report(s) SBAR, Kardex, OR Summary, Procedure Summary, Intake/Output, MAR, Accordion, Recent Results, Med Rec Status and Cardiac Rhythm NSR 1st degree W/ BBB was reviewed with the receiving nurse. Lines:  
Peripheral IV 02/16/21 Left Forearm (Active) Site Assessment Clean, dry, & intact 02/20/21 1515 Phlebitis Assessment 0 02/20/21 1515 Infiltration Assessment 0 02/20/21 1515 Dressing Status Clean, dry, & intact 02/20/21 1515 Dressing Type Tape;Transparent 02/20/21 1515 Hub Color/Line Status Blue; Infusing 02/20/21 1515 Action Taken Open ports on tubing capped 02/18/21 0844 Alcohol Cap Used Yes 02/18/21 0844 Peripheral IV 02/20/21 Anterior;Proximal;Right Forearm (Active) Site Assessment Clean, dry, & intact 02/20/21 1515 Phlebitis Assessment 0 02/20/21 1515 Infiltration Assessment 0 02/20/21 1515 Dressing Status Clean, dry, & intact 02/20/21 1515 Dressing Type Tape;Transparent 02/20/21 1515 Hub Color/Line Status Pink;Capped 02/20/21 1515 Opportunity for questions and clarification was provided. Patient transported with: 
 Monitor Registered Nurse, adelina clemens, glasses, and 2 rings, yellow and gray metal returned to patient's room with patient. Upon returning to room had difficulty assisting RN obtain postop BP. Attempted multiple times at different sites with different automated BP machines. Manual BP obtained 88/54. Patient feels cold to touch on extremities despite multiple blankets in place and axillary temp 97.3  prior to transfer. Marcello Miller RN at bedside HR 80's SpO2 100% on room air. Patient drowsy but arouses easily and voices no complaints. Encouraged RN to call RRT or MD if she felt it necessary due to change in patient condition. Waited in room until RRT called overhead and RRT RN in room.

## 2021-02-20 NOTE — PROGRESS NOTES
Spiritual Care Assessment/Progress Note [address] 
 
 
NAME: Carey Vaca      MRN: [id number] AGE: 82 y.o. SEX: female Amish Affiliation: Druze Language: English  
 
2/20/2021     Total Time (in minutes): 6 Spiritual Assessment begun in [facility] 3 MED TELE through conversation with: 
  
    []Patient        [] Family    [] Friend(s) Reason for Consult: Crisis Spiritual beliefs: (Please include comment if needed) 
   [] Identifies with a navi tradition:     
   [] Supported by a navi community:        
   [] Claims no spiritual orientation:       
   [] Seeking spiritual identity:            
   [] Adheres to an individual form of spirituality:       
   [x] Not able to assess:                   
 
    
Identified resources for coping:  
   [] Prayer                           
   [] Music                  [] Guided Imagery 
   [] Family/friends                 [] Pet visits [] Devotional reading                         [x] Unknown 
   [] Other:                                          
 
 
Interventions offered during this visit: (See comments for more details) Patient Interventions: Crisis Family/Friend(s): Prayer (actual) Plan of Care: 
 
 [] Support spiritual and/or cultural needs  
 [] Support AMD and/or advance care planning process    
 [] Support grieving process 
 [] Coordinate Rites and/or Rituals  
 [] Coordination with community clergy [] No spiritual needs identified at this time 
 [] Detailed Plan of Care below (See Comments)  [] Make referral to Music Therapy 
[] Make referral to Pet Therapy    
[] Make referral to Addiction services 
[] Make referral to [facility] 
[] Make referral to Spiritual Care Partner 
[] No future visits requested       
[x] Follow up upon further referrals Comments: Responded to Rapid Response page for pt Caitlyn Michael on [address]. No family was present at bedside. Pt was being assessed and assisted by medical team and unavailable for pastoral visit. Provided pastoral presence on unit. Consult with RRT. Advised of  services. [address] [place] [address] Spiritual Care Provider  Paging Service [phone])

## 2021-02-20 NOTE — PROGRESS NOTES
Cardiology Progress Note [address]  [phone] 
 
2/20/2021 9:09 AM 
 
Admit Date: 2/15/2021 Admit Diagnosis: AMS (altered mental status) [R41.82];UTI (urinary tract infection) [N39.0]; Yeast dermatitis [B37.2]; Leukocytosis [D72.829] Subjective:  
 
Orest Mix   denies chest pain. Visit Vitals BP (!) 154/75 (BP 1 Location: Left upper arm, BP Patient Position: Supine) Pulse 68 Temp (!) 96.5 °F (35.8 °C) Resp 12 Ht 5' 6\" (1.676 m) Wt 98 lb 12.3 oz (44.8 kg) SpO2 100% BMI 15.94 kg/m² Current Facility-Administered Medications Medication Dose Route Frequency  potassium chloride 10 mEq in 100 ml IVPB  10 mEq IntraVENous Q2H  
 magnesium sulfate 2 g/50 ml IVPB (premix or compounded)  2 g IntraVENous ONCE  
 metoprolol (LOPRESSOR) injection 2.5 mg  2.5 mg IntraVENous Q6H  
 dextrose 5% infusion  75 mL/hr IntraVENous CONTINUOUS  
 HYDROmorphone (PF) (DILAUDID) injection 0.2 mg  0.2 mg IntraVENous Q2H PRN  
 aspirin delayed-release tablet 81 mg  81 mg Oral DAILY  atorvastatin (LIPITOR) tablet 10 mg  10 mg Oral QHS  ergocalciferol capsule 50,000 Units  50,000 Units Oral Q7D  
 sodium chloride (NS) flush 5-40 mL  5-40 mL IntraVENous Q8H  
 sodium chloride (NS) flush 5-40 mL  5-40 mL IntraVENous PRN  
 acetaminophen (TYLENOL) tablet 650 mg  650 mg Oral Q6H PRN Or  
 acetaminophen (TYLENOL) suppository 650 mg  650 mg Rectal Q6H PRN  polyethylene glycol (MIRALAX) packet 17 g  17 g Oral DAILY PRN  promethazine (PHENERGAN) tablet 12.5 mg  12.5 mg Oral Q6H PRN Or  
 ondansetron (ZOFRAN) injection 4 mg  4 mg IntraVENous Q6H PRN  
 nystatin (MYCOSTATIN) 100,000 unit/gram powder   Topical BID  cefTRIAXone (ROCEPHIN) 1 g in 0.9% sodium chloride (MBP/ADV) 50 mL MBP  1 g IntraVENous Q24H  
 insulin lispro (HUMALOG) injection   SubCUTAneous AC&HS  
 glucose chewable tablet 16 g  4 Tab Oral PRN  
  dextrose (D50W) injection syrg 12.5-25 g  12.5-25 g IntraVENous PRN  
 glucagon (GLUCAGEN) injection 1 mg  1 mg IntraMUSCular PRN  
 [Held by provider] carvediloL (COREG) tablet 12.5 mg  12.5 mg Oral BID WITH MEALS  
 heparin (porcine) injection 5,000 Units  5,000 Units SubCUTAneous Q12H  clindamycin (CLEOCIN) 900mg D5W 50mL IVPB (premix)  900 mg IntraVENous Q8H  
 balsam peru-castor oiL (VENELEX) ointment   Topical BID  miconazole (SECURA) 2 % extra thick cream   Topical BID  thiamine (B-1) 200 mg in 0.9% sodium chloride 50 mL IVPB  200 mg IntraVENous DAILY Objective:  
  
Visit Vitals BP (!) 154/75 (BP 1 Location: Left upper arm, BP Patient Position: Supine) Pulse 68 Temp (!) 96.5 °F (35.8 °C) Resp 12 Ht 5' 6\" (1.676 m) Wt 98 lb 12.3 oz (44.8 kg) SpO2 100% BMI 15.94 kg/m² Physical Exam: 
General:  Alert, cooperative, well noursished, well developed, appears stated age Eyes:  Sclera anicteric. Pupils equally round and reactive to light. Mouth/Throat: Mucous membranes normal, oral pharynx clear Neck: Supple Lungs:   Clear to auscultation bilaterally, good effort CV:  Regular rate and rhythm,no murmur, click, rub or gallop Abdomen:   Soft, non-tender. bowel sounds normal. non-distended Extremities: No cyanosis or edema Skin: Skin color, texture, turgor normal. no acute rash or lesions Lymph nodes: Cervical and supraclavicular normal  
Musculoskeletal: Left hip fracture Data Review:  
Labs:   
Recent Labs  
  02/20/21 
0639 02/19/21 
1647 02/18/21 
0036 WBC 16.1* 18.8* 25.1* HGB 10.0* 11.3* 10.0* HCT 30.9* 34.7* 32.8*  328 373 Recent Labs  
  02/20/21 
0605 02/19/21 
1444 02/18/21 
0036 02/17/21 
1054  144 160* 154* K 2.9* 4.3 3.7 3.9 * 115* 130* 122* CO2 23 24 24 26 * 157* 113* 100 BUN 29* 37* 59* 58* CREA 0.66 0.93 1.23* 1.12* CA 7.7* 7.9* 8.1* 8.6 MG 1.9 2.0 2.4 2.7* PHOS 2.9 2.8 3.8 3.5 ALB 1.4* 1.7*  --  1.9* TBILI 0.4 0.3  --  0.3 ALT 13 14  --  10* Recent Labs  
  02/17/21 
1054  Intake/Output Summary (Last 24 hours) at 2/20/2021 7328 Last data filed at 2/19/2021 1839 Gross per 24 hour Intake 1909 ml Output  Net 1909 ml Telemetry: nsr Assessment:  
 
Active Problems: 
  Leukocytosis (2/16/2021) UTI (urinary tract infection) (2/16/2021) Yeast dermatitis (2/16/2021) AMS (altered mental status) (2/16/2021) Hip fracture ([address] (2/17/2021) Failure to thrive in adult (2/17/2021) NICM (nonischemic cardiomyopathy) ([address] (2/17/2021) NSVT (nonsustained ventricular tachycardia) ([address] (2/18/2021) Plan:  
 
Antolin Bowers is in sinus. Echo demonstrated cardiomyopathy ef 30-35 on bb therapy. For hip surgery. Joshua Craig MD, [facility] 
 
2/20/2021
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
## 2021-02-21 NOTE — PROGRESS NOTES
OCCUPATIONAL THERAPY EVALUATION Patient: Antolin Bowers (4 y.o. female) Date: 2/21/2021 Primary Diagnosis: AMS (altered mental status) [R41.82] UTI (urinary tract infection) [N39.0] Yeast dermatitis [B37.2] Leukocytosis [D72.829] Procedure(s) (LRB): LEFT  HIP LUCIANA ARTHROPLASTY (Left) 1 Day Post-Op Precautions:   WBAT, Fall, DNR 
 
ASSESSMENT Based on the objective data described below, the patient presents with decreased orientation, oriented to self only. She is POD 1 from above surgery. The patient was limited today by cognition and low BP when attempting OOB activity. She was able to with total A (mod to min A x2) reach EOB and sit for some time prior to needed to lay down and feeling symptomatic, BP improved with postioning. Overall the patient needed max to total A for self-care due to poor initiation and strength observed in BUEs. Acute care OT will continue to follow, recommend SNF placement at this time due to above factors. Current Level of Function Impacting Discharge (ADLs/self-care): max A Functional Outcome Measure: The patient scored 5/100 on the Barthel outcome measure which is indicative of 95% impaired. Other factors to consider for discharge: level of support at home. Patient will benefit from skilled therapy intervention to address the above noted impairments. PLAN : 
Recommendations and Planned Interventions: self care training, functional mobility training, therapeutic exercise, balance training, therapeutic activities, endurance activities, neuromuscular re-education, patient education, home safety training, and family training/education Frequency/Duration: Patient will be followed by occupational therapy 4 times a week to address goals. Recommendation for discharge: (in order for the patient to meet his/her long term goals) Therapy up to 5 days/week in SNF setting This discharge recommendation: Has not yet been discussed the attending provider and/or case management IF patient discharges home will need the following DME: TBD SUBJECTIVE:  
Patient stated I am 31years old.  OBJECTIVE DATA SUMMARY:  
HISTORY:  
Past Medical History:  
Diagnosis Date Anemia CHF (congestive heart failure) ([facility] 4/15/2011 CRI (chronic renal insufficiency) Dementia ([facility] Diastolic CHF, acute ([facility] 3/19/2011 Hypertension Leukocytosis Pleural effusion SVT (supraventricular tachycardia) ([facility] UTI (urinary tract infection) V-tach [facility]) Yeast dermatitis History reviewed. No pertinent surgical history. Expanded or extensive additional review of patient history:  
 
Home Situation Home Environment: Private residence Living Alone: No 
Support Systems: Family member(s), Spouse/Significant Other/Partner Patient Expects to be Discharged to[de-identified] Unknown Hand dominance: Right EXAMINATION OF PERFORMANCE DEFICITS: 
Cognitive/Behavioral Status: 
Neurologic State: Drowsy Orientation Level: Oriented to person Cognition: Decreased attention/concentration;Decreased command following; Impaired decision making Safety/Judgement: Decreased awareness of environment;Decreased insight into deficits Skin: pt with multiple abrasions and ulcers Edema: none noted Hearing: Auditory Auditory Impairment: None Hearing Aids/Status: Does not own Vision/Perceptual:   
    
    
    
  
    
    
Corrective Lenses: Glasses Range of Motion: 
 
AROM: Grossly decreased, non-functional 
PROM: Generally decreased, functional 
  
  
  
  
  
  
 
Strength: 
 
Strength: Grossly decreased, non-functional 
  
  
  
  
 
Coordination: 
Coordination: Generally decreased, functional 
Fine Motor Skills-Upper: Left Intact; Right Intact Gross Motor Skills-Upper: Left Intact; Right Intact Tone & Sensation: 
 
Tone: Normal 
Sensation: Intact Balance: 
Sitting: Impaired; With support Sitting - Static: Poor (constant support) Sitting - Dynamic: Poor (constant support) Standing: (not tested ) Functional Mobility and Transfers for ADLs: 
Bed Mobility: 
Rolling: Total assistance;Assist x2 Supine to Sit: Total assistance;Assist x2 Sit to Supine: Total assistance;Assist x2 Scooting: Total assistance;Assist x2 Transfers: 
Sit to Stand: (not tested ) ADL Assessment: 
Patient recalled and demonstrated avoiding extreme planes of movement with Left LE during ADLs and functional mobility with max cues. Below was inferred based on observation of movement during functional mobility. Feeding: Setup Upper Body Dressing: Maximum assistance Lower Body Dressing: Total assistance Toileting: Total assistance ADL Intervention and task modifications: 
 
  
 
Cognitive Retraining Safety/Judgement: Decreased awareness of environment;Decreased insight into deficits Discussion and education provided on how to access and use call bell at the end of the session. The patient was able to recall and teach back which button to use to access RN (red) and change the channels on her TV Patient is not appropriate for education at this time due to cognition. Therapeutic Exercise: EOB sitting balance: min A constant contact for balance assistance UE Movement: As session progressed, improved command following and AROM in BUEs observed. Provided activities to increase blood flow when sitting EOB to include hand squeezes and elbow flexion/extension Functional Measure: 
Barthel Index: 
 
Bathin Bladder: 0 Bowels: 0 Groomin Dressin Feedin Mobility: 0 Stairs: 0 Toilet Use: 0 Transfer (Bed to Chair and Back): 5 Total: 5/100 The Barthel ADL Index: Guidelines 1. The index should be used as a record of what a patient does, not as a record of what a patient could do. 2. The main aim is to establish degree of independence from any help, physical or verbal, however minor and for whatever reason. 3. The need for supervision renders the patient not independent. 4. A patient's performance should be established using the best available evidence. Asking the patient, friends/relatives and nurses are the usual sources, but direct observation and common sense are also important. However direct testing is not needed. 5. Usually the patient's performance over the preceding 24-48 hours is important, but occasionally longer periods will be relevant. 6. Middle categories imply that the patient supplies over 50 per cent of the effort. 7. Use of aids to be independent is allowed. Frederica Del., Barthel, D.W. ([id number]). Functional evaluation: the Barthel Index. [facility] (14)2. Terri Conn heidi FAVIAN Ferreira, Myrla Holter., Dax Key., [place], [address] [place] [address] (1999). Measuring the change indisability after inpatient rehabilitation; comparison of the responsiveness of the Barthel Index and Functional [place] Measure. Journal of Neurology, Neurosurgery, and Psychiatry, 66(4), 166-561. Friad Arredondo, N.J.A, JONN Marquez, & Ronnie Butler, M.A. (2004.) Assessment of post-stroke quality of life in cost-effectiveness studies: The usefulness of the Barthel Index and the EuroQoL-5D. [facility], 13, 150-11 Occupational Therapy Evaluation Charge Determination History Examination Decision-Making MEDIUM Complexity : Expanded review of history including physical, cognitive and psychosocial  history  MEDIUM Complexity : 3-5 performance deficits relating to physical, cognitive , or psychosocial skils that result in activity limitations and / or participation restrictions HIGH Complexity : Patient presents with comorbidities that affect occupational performance. Signifigant modification of tasks or assistance (eg, physical or verbal) with assessment (s) is necessary to enable patient to complete evaluation Based on the above components, the patient evaluation is determined to be of the following complexity level: MEDIUM Pain Rating: 
None rated, no grimace noted Activity Tolerance:  
Fair See chart for BP  which dropped sitting EOB After treatment patient left in no apparent distress:   
Supine in bed, Heels elevated for pressure relief, Call bell within reach, and Side rails x 3 
 
COMMUNICATION/EDUCATION:  
The patients plan of care was discussed with: Physical therapist and Registered nurse. Patient is unable to participate in goal setting and plan of care. This patients plan of care is appropriate for delegation to [facility]. Thank you for this referral. 
Christa Oconnor Time Calculation: 28 mins

## 2021-02-21 NOTE — PROGRESS NOTES
Problem: Mobility Impaired (Adult and Pediatric) Goal: *Acute Goals and Plan of Care (Insert Text) Description: FUNCTIONAL STATUS PRIOR TO ADMISSION: patient is a poor historian and confused. Per chart, patient has been bedbound for the last month, requiring assist for ADLS. Prior to 1 month ago, patient was ambulating with RW? 
 
HOME SUPPORT PRIOR TO ADMISSION: The patient lived with  and daughter who assist with ADLS. Physical Therapy Goals Initiated 2/21/2021 1. Patient will move from supine to sit and sit to supine , scoot up and down, and roll side to side in bed with moderate assistance  within 7 day(s). 2.  Patient will transfer from bed to chair and chair to bed with moderate assistance x 2  using the least restrictive device within 7 day(s). 3.  Patient will perform sit to stand with moderate assistance x 2 within 7 day(s). Outcome: Not Met PHYSICAL THERAPY EVALUATION Patient: Gary Irizarry (89 y.o. female) Date: 2/21/2021 Primary Diagnosis: AMS (altered mental status) [R41.82] UTI (urinary tract infection) [N39.0] Yeast dermatitis [B37.2] Leukocytosis [D72.829] Procedure(s) (LRB): LEFT  HIP LUCIANA ARTHROPLASTY (Left) 1 Day Post-Op Precautions:   WBAT, Fall, DNR 
 
ASSESSMENT Based on the objective data described below, the patient presents with grossly decreased strength, decreased functional mobility, poor sitting balance, orthostatic, and confused s/p L hip hemiarthroplasty POD 1. Patient is functioning below her baseline although difficult to understand baseline due to confusion. Patient required total A x 2 for all bed mobility. Once sitting on EOB, patient required total support with poor sitting balance and noted BP dropped significantly (see below). Patient on 50/100 mcg of damian. Patient returned to supine at the conclusion of PT evaluation. Will trial for daily although will likely drop frequency pending participation and accurate PLOF. Current Level of Function Impacting Discharge (mobility/balance): total A x 2 for bed mobility Functional Outcome Measure: The patient scored 0/100 on the Barthel outcome measure which is indicative of high functional impairment. Other factors to consider for discharge: fall risk, confusion, pain, prior level? Patient will benefit from skilled therapy intervention to address the above noted impairments. PLAN : 
Recommendations and Planned Interventions: bed mobility training, transfer training, therapeutic exercises, patient and family training/education, and therapeutic activities Frequency/Duration: Patient will be followed by physical therapy:  daily to address goals. Recommendation for discharge: (in order for the patient to meet his/her long term goals) Therapy up to 5 days/week in SNF setting This discharge recommendation: 
Has not yet been discussed the attending provider and/or case management IF patient discharges home will need the following DME: to be determined (TBD) SUBJECTIVE:  
Patient stated I appreciate you taking it slow.  OBJECTIVE DATA SUMMARY:  
HISTORY:   
Past Medical History:  
Diagnosis Date Anemia CHF (congestive heart failure) ([facility] 4/15/2011 CRI (chronic renal insufficiency) Dementia ([facility] Diabetes ([facility] Diastolic CHF, acute ([facility] 3/19/2011 Hypertension Leukocytosis Pleural effusion SVT (supraventricular tachycardia) ([facility] UTI (urinary tract infection) [facility]) Yeast dermatitis History reviewed. No pertinent surgical history. Personal factors and/or comorbidities impacting plan of care: bedbound recently, fall risk, pain 
 
Home Situation Home Environment: Private residence Living Alone: No 
Support Systems: Family member(s), Spouse/Significant Other/Partner Patient Expects to be Discharged to[de-identified] Unknown EXAMINATION/PRESENTATION/DECISION MAKING:  
Critical Behavior: Neurologic State: Drowsy Orientation Level: Oriented to person Cognition: Decreased attention/concentration, Decreased command following, Impaired decision making Hearing: Auditory Auditory Impairment: None Hearing Aids/Status: Does not own Skin:  L hip with dressing Edema:  
Range Of Motion: 
AROM: Grossly decreased, non-functional 
  
  
  
PROM: Generally decreased, functional 
  
  
  
Strength:   
Strength: Grossly decreased, non-functional 
  
  
  
  
  
  
Tone & Sensation:  
  
  
  
  
  
Sensation: Intact Coordination: 
Coordination: Generally decreased, functional 
Vision:  
  
Functional Mobility: 
Bed Mobility: 
Rolling: Total assistance;Assist x2 Supine to Sit: Total assistance;Assist x2 Sit to Supine: Total assistance;Assist x2 Scooting: Total assistance;Assist x2 Transfers: 
Sit to Stand: (not tested ) Balance:  
Sitting: Impaired; With support Sitting - Static: Poor (constant support) Sitting - Dynamic: Poor (constant support) Standing: (not tested ) Ambulation/Gait Training: 
  
  
  
  
  
  
  
  
  
  
  
 
   
 
Therapeutic Exercises: Ankle pumps and toe wiggles Functional Measure: 
Barthel Index: 
 
Bathin Bladder: 0 Bowels: 0 Groomin Dressin Feedin Mobility: 0 Stairs: 0 Toilet Use: 0 Transfer (Bed to Chair and Back): 0 Total: 0/100 The Barthel ADL Index: Guidelines 1. The index should be used as a record of what a patient does, not as a record of what a patient could do. 2. The main aim is to establish degree of independence from any help, physical or verbal, however minor and for whatever reason. 3. The need for supervision renders the patient not independent. 4. A patient's performance should be established using the best available evidence. Asking the patient, friends/relatives and nurses are the usual sources, but direct observation and common sense are also important. However direct testing is not needed. 5. Usually the patient's performance over the preceding 24-48 hours is important, but occasionally longer periods will be relevant. 6. Middle categories imply that the patient supplies over 50 per cent of the effort. 7. Use of aids to be independent is allowed. Negar Harper., Barthel, DJoselynW. ([id number]). Functional evaluation: the Barthel Index. [facility] (14)2. Kayla Cuevas heidi LOLIS FerreiraF, Imer Linares., Padmini Herndon., [place], [address] (1999). Measuring the change indisability after inpatient rehabilitation; comparison of the responsiveness of the Barthel Index and Functional [place] Measure. Journal of Neurology, Neurosurgery, and Psychiatry, 66(4), 779-371. Taylor Harry, N.J.A, JONN Marquez, & Carlton Sandoval MMARIA ESTHER. (2004.) Assessment of post-stroke quality of life in cost-effectiveness studies: The usefulness of the Barthel Index and the EuroQoL-5D. [facility], 91, 759-74 Physical Therapy Evaluation Charge Determination History Examination Presentation Decision-Making MEDIUM  Complexity : 1-2 comorbidities / personal factors will impact the outcome/ POC  MEDIUM Complexity : 3 Standardized tests and measures addressing body structure, function, activity limitation and / or participation in recreation  MEDIUM Complexity : Evolving with changing characteristics  Other outcome measures Barthel  HIGH Based on the above components, the patient evaluation is determined to be of the following complexity level: MEDIUM Activity Tolerance:  
Poor, requires rest breaks, and signs and symptoms of orthostatic hypotension After treatment patient left in no apparent distress: Supine in bed, Call bell within reach, and Side rails x 3 
 
COMMUNICATION/EDUCATION:  
The patients plan of care was discussed with: Occupational therapist, Registered nurse, and Case management. Fall prevention education was provided and the patient/caregiver indicated understanding., Patient/family have participated as able in goal setting and plan of care. , and Patient/family agree to work toward stated goals and plan of care. Thank you for this referral. 
Mode Whitaker, PT, DPT Time Calculation: 27 mins

## 2021-02-21 NOTE — OP NOTES
[address] 
OPERATIVE REPORT Name:  Yi Holder 
MR#:  [id number] :  [id number] ACCOUNT #:  [de-identified] DATE OF SERVICE:  2021 PREOPERATIVE DIAGNOSIS:  Left femoral neck fracture. POSTOPERATIVE DIAGNOSIS:  Left femoral neck fracture. PROCEDURE PERFORMED:  Left hip hemiarthroplasty. SURGEON:  Junior Puga MD 
 
ASSISTANT:  None. ANESTHESIA:  gen. COMPLICATIONS:  None. SPECIMENS REMOVED:  None. IMPLANTS:  See note. ESTIMATED BLOOD LOSS:  Minimal. 
 
DISPOSITION:  Recovery room. INDICATIONS:  This is an 82-year-old female who fell and was diagnosed with left hip femoral neck fracture. She was cleared by Medicine. Risks, benefits, and alternatives of hemiarthroplasty were explained in detail and consent was obtained. PROCEDURE:  The patient was taken to operating room, laid supine on the [place] table after general anesthetic was administered and the left hip was prepped and draped in usual sterile fashion and time-out was called. Following time-out, incision was commenced 2 cm distal and lateral to the left ASIS extending towards the fibular head. The fascia was split in line with the incision and tensor carried laterally. Perforating vessels were cauterized. Reflected head of the rectus was swept off the anterior capsule and retractor was placed along the medial neck and superolateral neck. T-shaped capsulotomy was made and the ends were tagged with #5 Ethibond. Release was then carried out along the medial calcar and superolateral acetabular capsular attachment as well as superior femoral neck. Two Gelpi retractors were placed. Neck cut was then made and the head was removed and measured to be a size-46. The capsulotomy was carried out further along the posterior capsule to allow for better lengthening of the leg as she was somewhat contracted proximally. The leg was then externally rotated to 120 degrees, hyperextended, and adducted. Relaxing incision was made in the capsule to allow the trochanteric tubercle to buttonhole through. Then, the femur was sequentially broached up to a size-3 broach, which was trialed with a standard length neck with good stability. The hip was dislocated again and the broach was removed, and after copious irrigation, a final size-3 Accolade II stem was press-fit into position, sinking a little bit deeper than the broach; therefore, a trial with a +4 with good result and good stability at 90 degrees of external rotation. It was dislocated again and the trial of bipolar head was removed. A final size-46 bipolar head, +4 femoral neck, and +4 neck was selected and impacted onto the [facility]. The hip was reduced and again was stable. The wound was further irrigated and the capsule was closed with interrupted #1 Vicryl, the fascia with interrupted #1 Vicryl. FloSeal was used during closure and the wound was also infiltrated with orthopedic pain solution both deep and superficial to the capsule. The subcutaneous was closed with 2-0 Vicryl and the skin with staples. Sterile dressing was applied. There were no complications. Jess Hassan MD 
 
 
[id number] 
D:  02/20/2021 13:53 T:  02/20/2021 19:20 
JOB #:  [id number]

## 2021-02-21 NOTE — PROGRESS NOTES
JENARO: 
1) [facility], [facility]. (referral pending - will need insurance auth) 2) COVID-19 Test (within 72 hrs of DC) 3) [facility] BLS transport 4) Follow-up Care Appointments 5) 2nd IM letter 8:31AM - Consult to CM for DC Planning. Per chart review, pt's spouse agreeable to SNF placement at [facility]. Referral sent 2/18/21 however pt will need PT/OT consults and documentation in order for SNF to submit for [address] [facility]. CC Referral to [facility] denied. CM contacted [facility], [facility]. to discuss reason for denial - awaiting response. Tammie Weaver, KOFIW [facility] [phone]

## 2021-02-21 NOTE — PROGRESS NOTES
02/21/21 0900 02/21/21 0917 02/21/21 0920 Vital Signs Pulse (Heart Rate) (!) 114  --   --   
/81 (!) 89/60 119/60 MAP (Monitor) 93  --   --   
MAP (Calculated) 96 70 80 BP 1 Location  --  Left arm Left arm BP 1 Method  --  Automatic Automatic  
BP Patient Position  --  Sitting At rest;Supine Full evaluation note to follow. Recommending SNF at discharge.   
 
Jere Knapp, PT, DPT

## 2021-02-21 NOTE — PROGRESS NOTES
Bedside and Verbal shift change report given to Ulisses Brand (oncoming nurse) by Haydee Rabago (offgoing nurse). Report included the following information SBAR, Kardex, Procedure Summary, Intake/Output, MAR and Recent Results. 1865: Patient with loose stool, bed pan changed, patient cleaned up and turned on right side. Heels elevated on heel pillow. Patient denies pain at this time. 0940: Rashaun drip now at 40 mcg/min. Physical therapist at bedside, able to get patient to sitting position on side of bed and blood pressure dropped to 89/60. Patient assisted back to bed 
 
0945: Patient passed STAND test, aspirin given with applesauce per MD orders. 1035: right arm noted to be swollen at IV site with Rashaun infusing, fluids stopped infusing and started in another line on left arm. Right arm elevated on pillow. 1130: Rapid Response nurse at bedside, with concern of right arm IV site being extravasated from Rashaun. Pharmacy and MD called, MD at bedside to assess. Will give Phentolamine per orders. 1155: Left arm IV assess with ultrasound by rashard SALDANA for use at this time. Bilateral arms with edema. Blood pressure currently at 122/52. Albumin infusing, Rashaun drip stopped per MD orders 1250: Phentolamine injection given around IV site 1435: Wound care performed to sacrum per orders, patient tolerated ok. Resting now left side. Warm compress applied to right arm IV site 1500: Patient's blood pressure noted to be systolic in 20'H, spoke with Dr. Emeka Pires and midodrine and albumin ordered. 1735: Patient with another large, loose incontinent bowel movement. Wound care done again, linens change and patient repositioned on left side

## 2021-02-21 NOTE — PROGRESS NOTES
Cardiac Electrophysiology Progress Note [address], SISTER [facility], [address] [facility]  [phone] 
 
2/21/2021 10:25 AM 
 
Admit Date: 2/15/2021 Admit Diagnosis: AMS (altered mental status) [R41.82] UTI (urinary tract infection) [N39.0] Yeast dermatitis [B37.2] Leukocytosis [D72.829] Subjective:  
 
Antolin Bowers   denies chest pain, chest pressure/discomfort, dyspnea. Visit Vitals /62 Pulse (!) 104 Temp 98.1 °F (36.7 °C) Resp 17 Ht 5' 6\" (1.676 m) Wt 120 lb 9.6 oz (54.7 kg) SpO2 100% BMI 19.47 kg/m² Current Facility-Administered Medications Medication Dose Route Frequency  dextrose 5 % - 0.45% NaCl infusion  75 mL/hr IntraVENous CONTINUOUS  
 heparin (porcine) injection 5,000 Units  5,000 Units SubCUTAneous Q12H  carvediloL (COREG) tablet 3.125 mg  3.125 mg Oral BID WITH MEALS  
 albumin human 5% (BUMINATE) solution 12.5 g  12.5 g IntraVENous ONCE  
 sodium chloride (NS) flush 5-40 mL  5-40 mL IntraVENous Q8H  
 sodium chloride (NS) flush 5-40 mL  5-40 mL IntraVENous PRN  
 acetaminophen (TYLENOL) tablet 650 mg  650 mg Oral Q6H  
 ondansetron (ZOFRAN ODT) tablet 4 mg  4 mg Oral Q4H PRN  
 ondansetron (ZOFRAN) injection 4 mg  4 mg IntraVENous Q4H PRN  
 calcium-vitamin D (OS-MARIAMA +D3) 500 mg-200 unit per tablet 1 Tab  1 Tab Oral TID WITH MEALS  senna-docusate (PERICOLACE) 8.6-50 mg per tablet 1 Tab  1 Tab Oral BID  polyethylene glycol (MIRALAX) packet 17 g  17 g Oral DAILY  [START ON 2/22/2021] bisacodyL (DULCOLAX) suppository 10 mg  10 mg Rectal DAILY PRN  
 PHENYLephrine (RIDDHI-SYNEPHRINE) 30 mg in 0.9% sodium chloride 250 mL infusion   mcg/min IntraVENous TITRATE  aspirin delayed-release tablet 81 mg  81 mg Oral DAILY  atorvastatin (LIPITOR) tablet 10 mg  10 mg Oral QHS  acetaminophen (TYLENOL) tablet 650 mg  650 mg Oral Q6H PRN  Or  
 acetaminophen (TYLENOL) suppository 650 mg  650 mg Rectal Q6H PRN  
  polyethylene glycol (MIRALAX) packet 17 g  17 g Oral DAILY PRN  promethazine (PHENERGAN) tablet 12.5 mg  12.5 mg Oral Q6H PRN Or  
 ondansetron (ZOFRAN) injection 4 mg  4 mg IntraVENous Q6H PRN  
 nystatin (MYCOSTATIN) 100,000 unit/gram powder   Topical BID  cefTRIAXone (ROCEPHIN) 1 g in 0.9% sodium chloride (MBP/ADV) 50 mL MBP  1 g IntraVENous Q24H  
 insulin lispro (HUMALOG) injection   SubCUTAneous AC&HS  
 glucose chewable tablet 16 g  4 Tab Oral PRN  
 glucagon (GLUCAGEN) injection 1 mg  1 mg IntraMUSCular PRN  
 balsam peru-castor oiL (VENELEX) ointment   Topical BID  miconazole (SECURA) 2 % extra thick cream   Topical BID  thiamine (B-1) 200 mg in 0.9% sodium chloride 50 mL IVPB  200 mg IntraVENous DAILY Objective:  
  
Visit Vitals /62 Pulse (!) 104 Temp 98.1 °F (36.7 °C) Resp 17 Ht 5' 6\" (1.676 m) Wt 120 lb 9.6 oz (54.7 kg) SpO2 100% BMI 19.47 kg/m² Physical Exam: Abdomen: soft, non-tender Extremities: extremities normal 
Heart: regular rate and rhythm Lungs: clear to auscultation bilaterally Pulses: 2+ and symmetric Data Review:  
Labs:   
Recent Labs  
  02/21/21 
0412 02/20/21 
1659 02/19/21 
1647 WBC 29.0* 16.1* 18.8* HGB 10.3* 10.0* 11.3* HCT 31.9* 30.9* 34.7*  
 308 328 Recent Labs  
  02/21/21 
0412 02/20/21 
0605 02/19/21 
1444  142 144  
K 4.5 2.9* 4.3  
* 110* 115* CO2 19* 23 24 GLU 96 156* 157* BUN 31* 29* 37* CREA 0.99 0.66 0.93  
CA 7.4* 7.7* 7.9*  
MG 2.7* 1.9 2.0 PHOS 4.2 2.9 2.8 ALB  --  1.4* 1.7* TBILI  --  0.4 0.3 ALT  --  13 14 No results for input(s): TROIQ, CPK, CKMB in the last 72 hours. Intake/Output Summary (Last 24 hours) at 2/21/2021 1025 Last data filed at 2/21/2021 0530 Gross per 24 hour Intake 820 ml Output 605 ml Net 215 ml Telemetry: SR 80s Assessment:  
 
Active Problems: 
  Leukocytosis (2/16/2021) UTI (urinary tract infection) (2/16/2021) Yeast dermatitis (2/16/2021) AMS (altered mental status) (2/16/2021) Hip fracture ([facility] [address] (2/17/2021) Failure to thrive in adult (2/17/2021) NICM (nonischemic cardiomyopathy) ([facility] [address] (2/17/2021) NSVT (nonsustained ventricular tachycardia) ([facility] [address] (2/18/2021) Plan:  
 
James Bernard is s/p LEFT  HIP LUCIANA ARTHROPLASTY yesterday with Dr Vahid Armas. Echo demonstrated cardiomyopathy ef 30-35 on bb therapy. Discussed lowering IVF with RN (D5 running at 125 currently). RN will discuss with hospitalist.  Normotensive. Dr Krystal Gee to follow tomorrow. SHANICE Davenport 
 
2/21/2021 
10:25 AM 
 
Patient seen and examined by me with nurse practitioner. I personally performed all components of the history, physical, and medical decision making and agree with the assessment and plan with minor modifications as noted. Sp hip surgery. There were issues with hypotension post op. Now doing well. Cont bb for cardiomyopathy.  
 
Tresa Jj MD, [facility]

## 2021-02-21 NOTE — PROGRESS NOTES
1859 TRANSFER - IN REPORT: 
 
Verbal report received from Jossy(name) on Antolin Gloss  being received from ortho tele(unit) for routine progression of care Report consisted of patients Situation, Background, Assessment and  
Recommendations(SBAR). Information from the following report(s) SBAR was reviewed with the receiving nurse. Opportunity for questions and clarification was provided. Assessment completed upon patients arrival to unit and care assumed. 1900 Primary Nurse Rei Nina RN and Anthony Contreras RN performed a dual skin assessment on this patient Impairment noted- see wound doc flow sheet Jake score is 12

## 2021-02-22 NOTE — PROGRESS NOTES
Comprehensive Nutrition Assessment Type and Reason for Visit: Clearance Samir Nutrition Recommendations/Plan:  
Continue current diet and supplements as ordered Document %PO intake of meals/supplements in flowsheets Nutrition Assessment:    
Chart reviewed; medically noted for AMS, left hip fracture s/p hemiarthroplasty, and FTT. PMH HTN, DM, and CHF. Patient kept repeating that she would eat something a little later. Breakfast tray at bedside; only a few bites eaten off of tray (not sure what she ate 50% of per flowsheets?). Cleared for full liquids last week per SLP but now advanced to NDD2. Monitor intake and tolerance of diet; SLP follow up as needed. Multiple supplements ordered to help with intake; she had an open ensure clear at bedside with a few sips taken. She again states she will drink a little bit of the supplement later. Encouraged intake of meals/supplements. Assist with PO as needed. Of note, patient's weight last week was 98#; now weighing 131.5#. Will continue to monitor. Patient Vitals for the past 72 hrs: 
 % Diet Eaten 02/22/21 1007 50 % Malnutrition Assessment: 
Malnutrition Status:  Severe malnutrition Context:  Acute illness Findings of the 6 clinical characteristics of malnutrition:  
Energy Intake:  7 - 50% or less of est energy requirements for 5 or more days Weight Loss:  7.00 - Greater than 7.5% over 3 months Body Fat Loss:  7 - Moderate body fat loss, Muscle Mass Loss:  7 - Moderate muscle mass loss, Fluid Accumulation:  No significant fluid accumulation,   
 Strength:  Not performed Estimated Daily Nutrient Needs: 
Energy (kcal): 1400 kcal (BMR 1072 x 1.3AF); Weight Used for Energy Requirements: Current Protein (g): 78-90g (1.3-1.5 g/kg bw); Weight Used for Protein Requirements: Current Fluid (ml/day): 1400 mL; Method Used for Fluid Requirements: 1 ml/kcal 
 
Nutrition Related Findings:      
1+ edema BM 2/22 Atorvastatin, Os-marleen, Coreg, Humalog, Thiamine, D5% IVF Wounds:   
Unstageable sacrum and spine, Surgical incision, Deep tissue injury right heel Current Nutrition Therapies: DIET NUTRITIONAL SUPPLEMENTS Breakfast, Lunch, Dinner; [employer] DIET NUTRITIONAL SUPPLEMENTS Lunch; Bev DIET NUTRITIONAL SUPPLEMENTS Dinner; Ensure Pudding DIET DYSPHAGIA MEC ALTERED (NDD2) Consistent Carb 1800kcal 
 
Anthropometric Measures: 
· Height:  5' 6\" (167.6 cm) · Current Body Wt:  59.6 kg (131 lb 6.3 oz) · BMI Category:  Normal weight (BMI 18.5-24. 9) Nutrition Diagnosis: · Severe malnutrition related to cognitive or neurological impairment, inadequate protein-energy intake as evidenced by intake 0-25%, moderate loss of subcutaneous fat, moderate muscle loss Nutrition Interventions:  
Food and/or Nutrient Delivery: Continue current diet, Continue oral nutrition supplement Nutrition Education and Counseling: No recommendations at this time Coordination of Nutrition Care: Continue to monitor while inpatient, Speech therapy Goals: 
PO intake at least 50% of meals/supplements next 3-5 days Nutrition Monitoring and Evaluation:  
Behavioral-Environmental Outcomes: None identified Food/Nutrient Intake Outcomes: Food and nutrient intake, Supplement intake Physical Signs/Symptoms Outcomes: Biochemical data, Weight Discharge Planning:   
Continue current diet, Continue oral nutrition supplement Electronically signed by Yael Hui RD on 2/22/2021 at 12:04 PM 
 
Contact: ext [phone]

## 2021-02-22 NOTE — CONSULTS
[facility] COnsult Note NAME: Gary Irizarry :  1935 MRN:  [id number] Date/Time: 2021 Risk of deterioration: medium Assessment:    Plan: 
BRO Hypovolemia/hypotension/ATN 
UTI/e coli/leukocytosis CMO-improved Hypernatremia-improved Multiple wounds Hip fracture Anemia Changing IVF to LR Pt received multiple boluses of IVF/low dose damian (off now)--due to hypotension during OR Pt will need placement as she looks quite frail and neglected. D/W nursing today on rounds Agree with IV albumin Renal function may worsen before improving Continue unger If renal function worse tomorrow will image Repeat ua Check b12/folate/iron Calcium corrects to normal  
Asked to see for oliguria and rising creatinine in the setting of hypotension. Subjective: Chief Complaint:  Poor historian Review of Systems: Patient was not able to provide review of systems due to mental status change/acute illness Objective: VITALS:  
Last 24hrs VS reviewed since prior progress note. Most recent are: 
Visit Vitals BP (!) 124/57 (BP 1 Location: Left upper arm) Pulse 81 Temp 98 °F (36.7 °C) Resp 18 Ht 5' 6\" (1.676 m) Wt 59.6 kg (131 lb 8 oz) SpO2 100% BMI 21.22 kg/m² SpO2 Readings from Last 6 Encounters:  
21 100% 20 96% 17 95% 16 97% 16 99% 09/16/15 97% O2 Flow Rate (L/min): 1 l/min Intake/Output Summary (Last 24 hours) at 2021 1708 Last data filed at 2021 1007 Gross per 24 hour Intake 360 ml Output 80 ml Net 280 ml Telemetry Reviewed PHYSICAL EXAM: 
 
General   well developed,thin, chronically ill appearing bf 
EENT  Normocephalic, Atraumatic, EOMI, sclera clear Respiratory   Clear To Auscultation bilaterally Cardiology  Regular Rate and Rythmn Abdominal  Soft, non-tender, non-distended Extremities  No clubbing, cyanosis, or edema. Pulses intact. Lab Data Reviewed: (see below) Medications Reviewed: (see below) PMH/SH reviewed - no change compared to H&P 
___________________________________________ Attending Physician: Sanjuana Claude, MD  
 
____________________________________________________ MEDICATIONS: 
Current Facility-Administered Medications Medication Dose Route Frequency  lactated Ringers infusion  100 mL/hr IntraVENous CONTINUOUS  
 heparin (porcine) injection 5,000 Units  5,000 Units SubCUTAneous Q12H  carvediloL (COREG) tablet 3.125 mg  3.125 mg Oral BID WITH MEALS  
 albumin human 25% (BUMINATE) solution 12.5 g  12.5 g IntraVENous Q12H  
 sodium chloride (NS) flush 5-40 mL  5-40 mL IntraVENous Q8H  
 sodium chloride (NS) flush 5-40 mL  5-40 mL IntraVENous PRN  
 acetaminophen (TYLENOL) tablet 650 mg  650 mg Oral Q6H  
 ondansetron (ZOFRAN ODT) tablet 4 mg  4 mg Oral Q4H PRN  
 calcium-vitamin D (OS-MARIAMA +D3) 500 mg-200 unit per tablet 1 Tab  1 Tab Oral TID WITH MEALS  
 aspirin delayed-release tablet 81 mg  81 mg Oral DAILY  atorvastatin (LIPITOR) tablet 10 mg  10 mg Oral QHS  acetaminophen (TYLENOL) tablet 650 mg  650 mg Oral Q6H PRN Or  
 acetaminophen (TYLENOL) suppository 650 mg  650 mg Rectal Q6H PRN  
 nystatin (MYCOSTATIN) 100,000 unit/gram powder   Topical BID  cefTRIAXone (ROCEPHIN) 1 g in 0.9% sodium chloride (MBP/ADV) 50 mL MBP  1 g IntraVENous Q24H  
 insulin lispro (HUMALOG) injection   SubCUTAneous AC&HS  
 glucose chewable tablet 16 g  4 Tab Oral PRN  
 glucagon (GLUCAGEN) injection 1 mg  1 mg IntraMUSCular PRN  
 balsam peru-castor oiL (VENELEX) ointment   Topical BID  miconazole (SECURA) 2 % extra thick cream   Topical BID  thiamine (B-1) 200 mg in 0.9% sodium chloride 50 mL IVPB  200 mg IntraVENous DAILY  
  
 
LABS: 
Recent Labs  
  02/22/21 
0444 02/22/21 0227 WBC 21.5* 20.5* HGB 8.2* 7.8* HCT 25.7* 25.3*  
 242 Recent Labs  
  02/22/21 0227 02/21/21 4601 02/20/21 
6664  144 142  
K 3.5 4.5 2.9*  
* 115* 110* CO2 18* 19* 23 BUN 34* 31* 29* CREA 1.05* 0.99 0.66 * 96 156* CA 7.3* 7.4* 7.7* MG 2.4 2.7* 1.9 PHOS 3.0 4.2 2.9 Recent Labs  
  02/22/21 
0227 02/20/21 
9222 ALT  --  13  
AP  --  69  
TBILI  --  0.4 TP  --  5.5* ALB 2.0* 1.4*  
GLOB  --  4.1* No results for input(s): INR, PTP, APTT, INREXT in the last 72 hours. No results for input(s): FE, TIBC, PSAT, FERR in the last 72 hours. No results for input(s): PH, PCO2, PO2 in the last 72 hours. No results for input(s): CPK, CKNDX, TROIQ in the last 72 hours. No lab exists for component: CPKMB Lab Results Component Value Date/Time  Glucose (POC) 128 (H) 02/22/2021 11:50 AM  
 Glucose (POC) 160 (H) 02/22/2021 08:03 AM  
 Glucose (POC) 137 (H) 02/21/2021 09:11 PM  
 Glucose (POC) 172 (H) 02/21/2021 04:45 PM  
 Glucose (POC) 128 (H) 02/21/2021 11:31 AM

## 2021-02-22 NOTE — PROGRESS NOTES
Po hip fracture. Pain and mental status both improved since last week. 
hgb stable 8. 2. wbc sill elevated 21.5 
afeb Patient Vitals for the past 24 hrs: 
 Temp Pulse Resp BP SpO2  
02/22/21 1000  93  (!) 123/51 100 % 02/22/21 0900  85  (!) 144/62 100 % 02/22/21 0700 98.2 °F (36.8 °C) 89 20 (!) 140/62 100 % 02/22/21 0400 98 °F (36.7 °C) 85 16 (!) 133/56 100 % 02/22/21 0000 98.3 °F (36.8 °C) 81 16 (!) 131/54 100 % 02/21/21 1930 98 °F (36.7 °C) 81 18 (!) 124/48 100 % 02/21/21 1815  88  (!) 120/50 100 % 02/21/21 1615  96  (!) 116/46 100 % 02/21/21 1545  (!) 101  (!) 106/50 100 % 02/21/21 1500  (!) 102  (!) 86/58 100 % 02/21/21 1433 98.3 °F (36.8 °C) (!) 104 16 (!) 104/56 100 % 02/21/21 1415  93  (!) 106/47 100 % 02/21/21 1400  95  (!) 104/44 100 % 02/21/21 1345  95  (!) 92/43 100 % 02/21/21 1300  90  (!) 94/42 99 % 02/21/21 1230  90  (!) 118/58 93 % Wound clean and dry Musculoskeletal: Curtis's sign negative in bilateral lower extremities. Calves soft, supple, non-tender upon palpation or with passive stretch. oob with pt Dc planning 
wbat

## 2021-02-22 NOTE — PROGRESS NOTES
[facility] [address], S[place] [facility], [address] [facility]  [phone] Cardiology Progress Note 2/22/2021 11:20 AM 
 
Admit Date: 2/15/2021 Admit Diagnosis:  
AMS (altered mental status) [R41.82];UTI (urinary tract infection) [N39.0]; Yeast dermatitis [B37.2]; Leukocytosis [D72.829] Subjective:  
 
Lyssa Wesley No SOB Visit Vitals BP (!) 123/51 Pulse 93 Temp 98.2 °F (36.8 °C) Resp 20 Ht 5' 6\" (1.676 m) Wt 131 lb 8 oz (59.6 kg) SpO2 100% BMI 21.22 kg/m² Current Facility-Administered Medications Medication Dose Route Frequency  dextrose 5 % - 0.45% NaCl infusion  75 mL/hr IntraVENous CONTINUOUS  
 heparin (porcine) injection 5,000 Units  5,000 Units SubCUTAneous Q12H  carvediloL (COREG) tablet 3.125 mg  3.125 mg Oral BID WITH MEALS  
 albumin human 25% (BUMINATE) solution 12.5 g  12.5 g IntraVENous Q12H  
 sodium chloride (NS) flush 5-40 mL  5-40 mL IntraVENous Q8H  
 sodium chloride (NS) flush 5-40 mL  5-40 mL IntraVENous PRN  
 acetaminophen (TYLENOL) tablet 650 mg  650 mg Oral Q6H  
 ondansetron (ZOFRAN ODT) tablet 4 mg  4 mg Oral Q4H PRN  
 calcium-vitamin D (OS-MARIAMA +D3) 500 mg-200 unit per tablet 1 Tab  1 Tab Oral TID WITH MEALS  
 aspirin delayed-release tablet 81 mg  81 mg Oral DAILY  atorvastatin (LIPITOR) tablet 10 mg  10 mg Oral QHS  acetaminophen (TYLENOL) tablet 650 mg  650 mg Oral Q6H PRN Or  
 acetaminophen (TYLENOL) suppository 650 mg  650 mg Rectal Q6H PRN  
 nystatin (MYCOSTATIN) 100,000 unit/gram powder   Topical BID  cefTRIAXone (ROCEPHIN) 1 g in 0.9% sodium chloride (MBP/ADV) 50 mL MBP  1 g IntraVENous Q24H  
 insulin lispro (HUMALOG) injection   SubCUTAneous AC&HS  
 glucose chewable tablet 16 g  4 Tab Oral PRN  
 glucagon (GLUCAGEN) injection 1 mg  1 mg IntraMUSCular PRN  
 balsam peru-castor oiL (VENELEX) ointment   Topical BID  miconazole (SECURA) 2 % extra thick cream   Topical BID  
  thiamine (B-1) 200 mg in 0.9% sodium chloride 50 mL IVPB  200 mg IntraVENous DAILY Objective:  
  
Physical Exam: 
General Appearance:   
Chest:   Clear Cardiovascular: RRR Extremities: no edema Skin:  Warm and dry.  
 
Data Review:  
Recent Labs  
  02/22/21 
0444 02/22/21 0227 02/21/21 
8356 WBC 21.5* 20.5* 29.0* HGB 8.2* 7.8* 10.3* HCT 25.7* 25.3* 31.9*  
 242 363 Recent Labs  
  02/22/21 
0227 02/21/21 
0412 02/20/21 
0605 02/19/21 
1444  144 142 144  
K 3.5 4.5 2.9* 4.3  
* 115* 110* 115* CO2 18* 19* 23 24 * 96 156* 157* BUN 34* 31* 29* 37* CREA 1.05* 0.99 0.66 0.93  
CA 7.3* 7.4* 7.7* 7.9*  
MG 2.4 2.7* 1.9 2.0 PHOS 3.0 4.2 2.9 2.8 ALB 2.0*  --  1.4* 1.7* TBILI  --   --  0.4 0.3 ALT  --   --  13 14 No results for input(s): TROIQ, CPK, CKMB in the last 72 hours. Intake/Output Summary (Last 24 hours) at 2/22/2021 1120 Last data filed at 2/22/2021 1007 Gross per 24 hour Intake 793.83 ml Output 130 ml Net 663.83 ml Telemetry:  
EKG: 
Cxray: 
 
Assessment:  
 
Active Problems: 
  Leukocytosis (2/16/2021) UTI (urinary tract infection) (2/16/2021) Yeast dermatitis (2/16/2021) AMS (altered mental status) (2/16/2021) Hip fracture ([facility] [address] (2/17/2021) Failure to thrive in adult (2/17/2021) NICM (nonischemic cardiomyopathy) ([facility] [address] (2/17/2021) NSVT (nonsustained ventricular tachycardia) ([facility] [address] (2/18/2021) Plan: CHF compensated. VSS. Cont Rx Breanne Costello M.D., [facility]

## 2021-02-22 NOTE — PROGRESS NOTES
JENARO: 
1) [facility] [place]. (referral pending - will need insurance auth) 2) COVID-19 Test (within 72 hrs of DC) 3) [facility] BLS transport 4) Follow-up Care Appointments 5) 2nd IM letter CM spoke to [facility] [phone] regarding why pt was denial. Pt was denial from [facility] due to the level of care pt is requiring. [address] offer for placement at [facility] or [facility]. CM spoke to pt's spouse regarding other placement at [facility] or [facility] and spouse did not want pt going there. CM offer to send referral to [facility] and spouse is agreeable to [facility]. CM updated [address] regarding spouse's decision. Agustina from [facility] is coming over to assess the pt tomorrow. CM will hold off from sending referral to [facility] until Stephenson comes and assess pt. CM will continue to follow and assistance pt with d/c planning. Aline Favre, David.Stephie. Care Manager [facility] 
[phone]

## 2021-02-22 NOTE — PROGRESS NOTES
Problem: Mobility Impaired (Adult and Pediatric) Goal: *Acute Goals and Plan of Care (Insert Text) Description: FUNCTIONAL STATUS PRIOR TO ADMISSION: patient is a poor historian and confused. Per chart, patient has been bedbound for the last month, requiring assist for ADLS. Prior to 1 month ago, patient was ambulating with RW? 
 
HOME SUPPORT PRIOR TO ADMISSION: The patient lived with  and daughter who assist with ADLS. Physical Therapy Goals Initiated 2/21/2021 1. Patient will move from supine to sit and sit to supine , scoot up and down, and roll side to side in bed with moderate assistance  within 7 day(s). 2.  Patient will transfer from bed to chair and chair to bed with moderate assistance x 2  using the least restrictive device within 7 day(s). 3.  Patient will perform sit to stand with moderate assistance x 2 within 7 day(s). Outcome: Progressing Towards Goal 
PHYSICAL THERAPY TREATMENT Patient: Yovany Troncoso (54 y.o. female) Date: 2/22/2021 Diagnosis: AMS (altered mental status) [R41.82] UTI (urinary tract infection) [N39.0] Yeast dermatitis [B37.2] Leukocytosis [D72.829] <principal problem not specified> Procedure(s) (LRB): LEFT  HIP LUCIANA ARTHROPLASTY (Left) 2 Days Post-Op Precautions: WBAT, Fall, DNR Chart, physical therapy assessment, plan of care and goals were reviewed. ASSESSMENT Patient continues with skilled PT services and is progressing towards goals. Limited by confusion, but her daughters present helped coax patient to work with PT this session. Bed exercises for THR were for the most part aarom. Came to sitting with max a x 2. Pushing backwards strongly initially but decreased after a few minutes. She did need min/mod assist for sitting balance once she was feeling a bit safer. Stood from edge of bed x 2 with max a x 2. Returned to bed with max a x 2 and needed to be cleaned up from loose stool. Left patient in L sidelying to decreased pressure on sacrum and heels using pillows for positioning and pressure relief b/t knees. Current Level of Function Impacting Discharge (mobility/balance): max a x 2 overall Other factors to consider for discharge: high risk for skin breakdown. Had been fairly independent until a month ago. PLAN : 
Patient continues to benefit from skilled intervention to address the above impairments. Continue treatment per established plan of care. to address goals. Recommendation for discharge: (in order for the patient to meet his/her long term goals) Therapy up to 5 days/week in SNF setting This discharge recommendation: 
Has been made in collaboration with the attending provider and/or case management IF patient discharges home will need the following DME: hospital  bed, wheelchair, bsc SUBJECTIVE:  
Patient stated don't move me rough.  OBJECTIVE DATA SUMMARY:  
Critical Behavior: 
Neurologic State: Confused, Alert Orientation Level: Disoriented to place, Disoriented to situation, Disoriented to time Cognition: Impaired decision making, Memory loss Safety/Judgement: Decreased awareness of environment, Decreased insight into deficits Functional Mobility Training: 
Bed Mobility: 
Rolling: Moderate assistance;Maximum assistance;Assist x2 Supine to Sit: Moderate assistance;Maximum assistance;Assist x2 
 Sit to Supine: Maximum assistance;Assist x2 Scooting: Maximum assistance Transfers: 
Sit to Stand: Maximum assistance;Assist x2 Stand to Sit: Assist x2; Moderate assistance;Maximum assistance Balance: 
Sitting: Impaired Sitting - Static: Poor (constant support) Sitting - Dynamic: Poor (constant support) Standing: Impaired Standing - Static: Constant support;Poor Standing - Dynamic : Not tested Ambulation/Gait Training: 
  
 Unable at this time. Right Side Weight Bearing: Full Left Side Weight Bearing: As tolerated Therapeutic Exercises:  
Aarom: ankle pumps, heel slides and hip abd. 
5 reps each side. Pain Ratin/10 during exercises; no pain post session Activity Tolerance:  
Fair, SpO2 stable on RA, and requires rest breaks After treatment patient left in no apparent distress:  
Patient positioned in L sidelying for pressure relief, Call bell within reach, Caregiver / family present, and Side rails x 3 
 
COMMUNICATION/COLLABORATION:  
The patients plan of care was discussed with: Registered nurse and Rehabilitation technician. Tamiko Modi, PT Time Calculation: 39 mins

## 2021-02-22 NOTE — PROGRESS NOTES
MIDLINE Insertion Procedure  Note:  
 
Procedure explained to  pt  along with risks and benefits. Procedure teaching completed. Pt denies questions or concerns at this time. Pre procedure assessment done. Maximum sterile barrier precautions observed throughout procedure. Lidocaine 1 %  2.0   ml sc injected to site prior to access the vein . Cannulated   brachial   vein using ultrasound guidance. Inserted  4.5  Fr. single   lumen midline to   right    arm. Blood return verified and  flushed with 20ml normal saline. Sterile dressing applied with biopatch, statLock and occlusive dressing as per protocol. Curos cap applied to port. Patient tolerated procedure well with minimal blood loss. Reason for access : Reliable IV Access / limited vascular access Complications related to insertion : None See nursing message  for midline reminders Midline is CT and MRI compatible. Inserted by : Alexus England RN. FRANCISCO GONZALES. Vascular Access Nurse Assisted by : Roger Phillips RN Vascular Access Nurse Total Catheter Length : 15  cm Internal length  :  13  cm External Length : At the hub    2     cm Arm circumference :    30   cm Catheter occupies   13   % of vein. Type of Midline:     [employer] Midline Ref#:    REF [id number] Lot#:  [id number] Expiration Date:    2021-12-31 Informed primary nurse Claribel BUNDY that midline is ready for use and to hang new infusion tubing prior to use and apply tourniquet above the catheter tip for blood draw. Alexus England RN. FRANCISCO GONZALES. PICC nurse.  Vascular Access Team

## 2021-02-22 NOTE — ROUTINE PROCESS
End of Shift Note Bedside shift change report given to Silvana (oncoming nurse) by Monique Rajput (offgoing nurse). Report included the following information SBAR, Kardex, Intake/Output, MAR, Recent Results, Med Rec Status, Cardiac Rhythm NSR/ST and Alarm Parameters Shift worked: 7p-7a Shift summary and any significant changes:  
  uneventful night. BP stable Concerns for physician to address:  None Zone phone for oncoming shift:  [phone] Activity: 
Activity Level: Bed Rest 
Number times ambulated in hallways past shift: 0 Number of times OOB to chair past shift: 0 Cardiac:  
Cardiac Monitoring: Yes     
Cardiac Rhythm: Normal sinus rhythm Access:  
Current line(s): PIV Genitourinary:  
Urinary status: unger Respiratory:  
O2 Device: Room air Chronic home O2 use?: NO Incentive spirometer at bedside: NO 
  
GI: 
Last Bowel Movement Date: 02/22/21 Current diet:  DIET NUTRITIONAL SUPPLEMENTS Breakfast, Lunch, Dinner; Ensure Clear DIET NUTRITIONAL SUPPLEMENTS Lunch; Paige-Deshaun DIET NUTRITIONAL SUPPLEMENTS Dinner; Ensure Pudding DIET DYSPHAGIA MECH ALTERED (NDD2) Consistent Carb 1800kcal 
Passing flatus: YES Tolerating current diet: YES Pain Management:  
Patient states pain is manageable on current regimen: YES Skin: 
Jake Score: 11 Interventions: turn team 
 
Patient Safety: 
Fall Score: Total Score: 3 Interventions: bed/chair alarm High Fall Risk: Yes Length of Stay: 
Expected LOS: 3d 19h Actual LOS: 6 Monique Rajput
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
## 2021-02-22 NOTE — PROGRESS NOTES
Nikunj Anthony (nephrology) for consult.  
 
[id number] Dr. Lupe Anthony is aware of consult. End of Shift Note Bedside shift change report given to Rocco Mohan (oncoming nurse) by Cecilia Armenta RN (offgoing nurse). Report included the following information SBAR Shift worked:  7a-7p Shift summary and any significant changes:  
  no 
  
Concerns for physician to address:  no 
  
Zone phone for oncoming shift:   [phone] Activity: 
Activity Level: Bed Rest 
Number times ambulated in hallways past shift: 0 Number of times OOB to chair past shift: 0 Cardiac:  
Cardiac Monitoring: Yes     
Cardiac Rhythm: Normal sinus rhythm Access:  
Current line(s): PIV and midline Genitourinary:  
Urinary status: unger Respiratory:  
O2 Device: Room air Chronic home O2 use?: NO Incentive spirometer at bedside: yes GI: 
Last Bowel Movement Date: 02/22/21 Current diet:  DIET NUTRITIONAL SUPPLEMENTS Breakfast, Lunch, Dinner; Ensure Clear DIET NUTRITIONAL SUPPLEMENTS Lunch; Paige-Deshaun DIET NUTRITIONAL SUPPLEMENTS Dinner; Ensure Pudding DIET DYSPHAGIA MECH ALTERED (NDD2) Consistent Carb 1800kcal 
Passing flatus: YES Tolerating current diet: YES 
% Diet Eaten: 50 % Pain Management:  
Patient states pain is manageable on current regimen: YES Skin: 
Jake Score: 13 Interventions: speciality bed and float heels Patient Safety: 
Fall Score: Total Score: 3 Interventions: bed/chair alarm and gripper socks High Fall Risk: Yes Length of Stay: 
Expected LOS: 8d 12h Actual LOS: 6 Cecilia Armenta RN

## 2021-02-23 NOTE — PROGRESS NOTES
[facility] Progress Note NAME: Farnaz Prescott :  1935 MRN:  [id number] Date/Time: 2021 Risk of deterioration: medium Assessment:    Plan: 
BRO Hypovolemia/hypotension/ATN 
UTI/e coli/leukocytosis CMO-improved Hypernatremia-improved Multiple wounds Hip fracture Anemia Folate levels down-added MVI and oral folate B12 levels adequate -stop iv infusions, will get in mvi 
bro resolved 
uop improved Iv kphos today Will see again upon request  
 
 
Subjective: Chief Complaint:  Poor historian Review of Systems: Patient was not able to provide review of systems due to mental status change/acute illness Objective: VITALS:  
Last 24hrs VS reviewed since prior progress note. Most recent are: 
Visit Vitals BP (!) 133/53 (BP 1 Location: Left lower arm, BP Patient Position: At rest) Pulse (!) 103 Temp 98.6 °F (37 °C) Resp 18 Ht 5' 6\" (1.676 m) Wt 56.7 kg (125 lb 1.6 oz) SpO2 100% BMI 20.19 kg/m² SpO2 Readings from Last 6 Encounters:  
21 100% 20 96% 17 95% 16 97% 16 99% 09/16/15 97% O2 Flow Rate (L/min): 1 l/min Intake/Output Summary (Last 24 hours) at 2021 1136 Last data filed at 2021 4019 Gross per 24 hour Intake 571.67 ml Output 1025 ml Net -453.33 ml Telemetry Reviewed PHYSICAL EXAM: 
 
General   well developed,thin, chronically ill appearing bf 
EENT  Normocephalic, Atraumatic, EOMI, sclera clear Respiratory   Clear To Auscultation bilaterally Cardiology  Regular Rate and Rythmn Abdominal  Soft, non-tender, non-distended Extremities  No clubbing, cyanosis, or edema. Pulses intact. Lab Data Reviewed: (see below) Medications Reviewed: (see below) PMH/SH reviewed - no change compared to H&P 
___________________________________________ Attending Physician: Luis Trevett, MD  
 
____________________________________________________ MEDICATIONS: 
 Current Facility-Administered Medications Medication Dose Route Frequency  multivitamin, tx-iron-ca-min (THERA-M w/ IRON) tablet 1 Tab  1 Tab Oral DAILY  lactated Ringers infusion  100 mL/hr IntraVENous CONTINUOUS  
 heparin (porcine) injection 5,000 Units  5,000 Units SubCUTAneous Q12H  carvediloL (COREG) tablet 3.125 mg  3.125 mg Oral BID WITH MEALS  sodium chloride (NS) flush 5-40 mL  5-40 mL IntraVENous Q8H  
 sodium chloride (NS) flush 5-40 mL  5-40 mL IntraVENous PRN  
 acetaminophen (TYLENOL) tablet 650 mg  650 mg Oral Q6H  
 ondansetron (ZOFRAN ODT) tablet 4 mg  4 mg Oral Q4H PRN  
 calcium-vitamin D (OS-MARIAMA +D3) 500 mg-200 unit per tablet 1 Tab  1 Tab Oral TID WITH MEALS  
 aspirin delayed-release tablet 81 mg  81 mg Oral DAILY  atorvastatin (LIPITOR) tablet 10 mg  10 mg Oral QHS  acetaminophen (TYLENOL) tablet 650 mg  650 mg Oral Q6H PRN Or  
 acetaminophen (TYLENOL) suppository 650 mg  650 mg Rectal Q6H PRN  
 nystatin (MYCOSTATIN) 100,000 unit/gram powder   Topical BID  cefTRIAXone (ROCEPHIN) 1 g in 0.9% sodium chloride (MBP/ADV) 50 mL MBP  1 g IntraVENous Q24H  
 insulin lispro (HUMALOG) injection   SubCUTAneous AC&HS  
 glucose chewable tablet 16 g  4 Tab Oral PRN  
 glucagon (GLUCAGEN) injection 1 mg  1 mg IntraMUSCular PRN  
 balsam peru-castor oiL (VENELEX) ointment   Topical BID  miconazole (SECURA) 2 % extra thick cream   Topical BID  thiamine (B-1) 200 mg in 0.9% sodium chloride 50 mL IVPB  200 mg IntraVENous DAILY  
  
 
LABS: 
Recent Labs  
  02/23/21 
0240 02/22/21 
0444 WBC 15.9* 21.5* HGB 7.3* 8.2* HCT 22.1* 25.7*  
 255 Recent Labs  
  02/23/21 
0240 02/22/21 
0227 02/21/21 
2784  142 144  
K 3.6 3.5 4.5  
* 115* 115* CO2 21 18* 19*  
BUN 24* 34* 31* CREA 0.72 1.05* 0.99 GLU 81 154* 96  
CA 7.2* 7.3* 7.4* MG 2.1 2.4 2.7* PHOS 2.2* 3.0 4.2 Recent Labs  
  02/22/21 
0227 ALB 2.0*  
 
 No results for input(s): INR, PTP, APTT, INREXT, INREXT in the last 72 hours. Recent Labs  
  02/23/21 
0240 TIBC 45* PSAT 89* FERR 440* No results for input(s): PH, PCO2, PO2 in the last 72 hours. No results for input(s): CPK, CKNDX, TROIQ in the last 72 hours. No lab exists for component: CPKMB Lab Results Component Value Date/Time  Glucose (POC) 84 02/23/2021 07:34 AM  
 Glucose (POC) 122 (H) 02/22/2021 09:48 PM  
 Glucose (POC) 156 (H) 02/22/2021 06:13 PM  
 Glucose (POC) 128 (H) 02/22/2021 11:50 AM  
 Glucose (POC) 160 (H) 02/22/2021 08:03 AM
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
## 2021-02-23 NOTE — PROGRESS NOTES
[address]  [phone] Cardiology Progress Note 2/23/2021 230PM 
 
Admit Date: 2/15/2021 Admit Diagnosis:  
AMS (altered mental status) [R41.82] UTI (urinary tract infection) [N39.0] Yeast dermatitis [B37.2] Leukocytosis [D72.829] Interval History/Subjective:  
 
Tim Johansen is a 82 y.o. female with PMH HFrEF, HTN, HLD, DM, pulmonary nodule who was  admitted for AMS (altered mental status) [R41.82] UTI (urinary tract infection) [N39.0] Yeast dermatitis [B37.2] Leukocytosis [D72.829]. -VSS 
-Hg 7.3 
-weight down 6#?; I/O even 
-Ms. Suzanne Bhatia states she's feeling okay. She denies any hip pain at this time. No SOB or chest pain. Visit Vitals BP 96/70 (BP 1 Location: Left lower arm, BP Patient Position: At rest) Pulse 94 Temp 98.7 °F (37.1 °C) Resp 18 Ht 5' 6\" (1.676 m) Wt 56.7 kg (125 lb 1.6 oz) SpO2 100% BMI 20.19 kg/m² Current Facility-Administered Medications Medication Dose Route Frequency  multivitamin, tx-iron-ca-min (THERA-M w/ IRON) tablet 1 Tab  1 Tab Oral DAILY  [START ON 3/11/7154] folic acid (FOLVITE) tablet 1 mg  1 mg Oral DAILY  potassium phosphate 30 mmol in dextrose 5% 500 mL infusion   IntraVENous ONCE  
 lactated Ringers infusion  100 mL/hr IntraVENous CONTINUOUS  
 heparin (porcine) injection 5,000 Units  5,000 Units SubCUTAneous Q12H  carvediloL (COREG) tablet 3.125 mg  3.125 mg Oral BID WITH MEALS  sodium chloride (NS) flush 5-40 mL  5-40 mL IntraVENous Q8H  
 sodium chloride (NS) flush 5-40 mL  5-40 mL IntraVENous PRN  
 acetaminophen (TYLENOL) tablet 650 mg  650 mg Oral Q6H  
 ondansetron (ZOFRAN ODT) tablet 4 mg  4 mg Oral Q4H PRN  
 calcium-vitamin D (OS-MARIAMA +D3) 500 mg-200 unit per tablet 1 Tab  1 Tab Oral TID WITH MEALS  
 aspirin delayed-release tablet 81 mg  81 mg Oral DAILY  atorvastatin (LIPITOR) tablet 10 mg  10 mg Oral QHS  acetaminophen (TYLENOL) tablet 650 mg  650 mg Oral Q6H PRN Or  
 acetaminophen (TYLENOL) suppository 650 mg  650 mg Rectal Q6H PRN  
 nystatin (MYCOSTATIN) 100,000 unit/gram powder   Topical BID  cefTRIAXone (ROCEPHIN) 1 g in 0.9% sodium chloride (MBP/ADV) 50 mL MBP  1 g IntraVENous Q24H  
 insulin lispro (HUMALOG) injection   SubCUTAneous AC&HS  
 glucose chewable tablet 16 g  4 Tab Oral PRN  
 glucagon (GLUCAGEN) injection 1 mg  1 mg IntraMUSCular PRN  
 balsam peru-castor oiL (VENELEX) ointment   Topical BID  miconazole (SECURA) 2 % extra thick cream   Topical BID Objective:  
  
Physical Exam: 
General: pleasant, cachectic, elderly AAF resting in bed in NAD Heart: RRR, no m/S3/JVD Lungs: clear/dim Abdomen: Soft, +BS, NTND Extremities: LE bertha +DP/PT, no edema. Neurologic: Grossly normal;  Disoriented to place 
  
 
Data Review:  
Recent Labs  
  02/23/21 
0240 02/22/21 
0444 02/22/21 0227 WBC 15.9* 21.5* 20.5* HGB 7.3* 8.2* 7.8* HCT 22.1* 25.7* 25.3*  
 255 242 Recent Labs  
  02/23/21 
0240 02/22/21 
0227 02/21/21 
4117  142 144  
K 3.6 3.5 4.5  
* 115* 115* CO2 21 18* 19* GLU 81 154* 96 BUN 24* 34* 31* CREA 0.72 1.05* 0.99  
CA 7.2* 7.3* 7.4* MG 2.1 2.4 2.7* PHOS 2.2* 3.0 4.2 ALB  --  2.0*  -- No results for input(s): TROIQ, CPK, CKMB in the last 72 hours. Intake/Output Summary (Last 24 hours) at 2/23/2021 1520 Last data filed at 2/23/2021 4672 Gross per 24 hour Intake 571.67 ml Output 1025 ml Net -453.33 ml Telemetry: SR to ST; Short burst of atrial ectopy ECG: NSR;  ST and T wave abnormality - no significant changes from EKG in 2014 Echocardiogram: EF 30-35% and multiple areas of hypokinesis CXRAY: n/a Assessment:  
 
Active Problems: 
  Leukocytosis (2/16/2021) UTI (urinary tract infection) (2/16/2021) Yeast dermatitis (2/16/2021) AMS (altered mental status) (2/16/2021) Hip fracture ([facility] (2/17/2021) Failure to thrive in adult (2/17/2021) NICM (nonischemic cardiomyopathy) ([facility] (2/17/2021) NSVT (nonsustained ventricular tachycardia) ([facility] (2/18/2021) Plan:  
 
Chronic HFrEF/NICM:  EF 30-35% here, which is an increase from prior. No s/s fluid overload. · Less PVCs/NSVT - short burst of atrial ectopy · Continue BB. BP borderline to try and add ACEi/ARB · Continue to monitor for s/s of fluid overload with the necessary fluid resuscitation. Still getting continuous IVF. · Patient would not be a lifevest candidate due to her mental status/ability to use the device. CAD history:  No cardiac complaints. Troponin negative. · ASA, statin, BB Shavonne Temple, ADA 
DNP, RN, AGACNP-BC

## 2021-02-23 NOTE — PROGRESS NOTES
JENARO: 
1) [place] [facility] [facility] has been started 2) COVID-19 Test (within 72 hrs of DC)-Requested for COVID 19 today 3) [facility] transport 4) Follow-up Care Appointments 5) 2nd IM letter 1118 am-CM sent information to [facility] [employer] to start auth. CM spoke to Kristofer Palafox to inform her that CM has started [place] and potential d/c on Thursday. 1003 am-[facility] [address] follow up with CM and they are accepting pt. CM will start auth. [id number] am-Agustina from [facility] did assess the pt and will be following back up with CM regarding placement. CM will continue to follow and assist with d/c planning. Cuauhtemoc Clark. Care Manager [facility] 
[phone]

## 2021-02-23 NOTE — INTERDISCIPLINARY ROUNDS
Interdisciplinary team rounds were held 2/23/2021 with the following team members:Care Management, Nursing, Nutrition, Pharmacy, Physician and Clinical Coordinator. Plan of care discussed. See clinical pathway and/or care plan for interventions and desired outcomes. Continue unger per Nephrology.

## 2021-02-23 NOTE — PROGRESS NOTES
End of Shift Note Bedside shift change report given to Bartolo SETHI RN (oncoming nurse) by Sarath Young RN (offgoing nurse). Report included the following information SBAR, Kardex, Intake/Output, MAR, Recent Results and Cardiac Rhythm NSR Shift worked:  7p-7a Shift summary and any significant changes:  
  intermittently refusing PO meds; needs to work c PT Concerns for physician to address:   
  
Zone phone for oncoming shift:   [phone] Activity: 
Activity Level: Bed Rest 
Number times ambulated in hallways past shift: 0 Number of times OOB to chair past shift: 0 Cardiac:  
Cardiac Monitoring: Yes     
Cardiac Rhythm: Normal sinus rhythm Access:  
Current line(s): midline Genitourinary:  
Urinary status: voiding and unger Respiratory:  
O2 Device: Room air Chronic home O2 use?: NO Incentive spirometer at bedside: N/A 
  
GI: 
Last Bowel Movement Date: 02/22/21 Current diet:  DIET NUTRITIONAL SUPPLEMENTS Breakfast, Lunch, Dinner; Ensure Clear DIET NUTRITIONAL SUPPLEMENTS Lunch; Paige-Deshaun DIET NUTRITIONAL SUPPLEMENTS Dinner; Ensure Pudding DIET DYSPHAGIA MECH ALTERED (NDD2) Consistent Carb 1800kcal 
Passing flatus: YES Tolerating current diet: YES 
% Diet Eaten: 50 % Pain Management:  
Patient states pain is manageable on current regimen: NO 
 
Skin: 
Jake Score: 13 Interventions: turn team, speciality bed, float heels, increase time out of bed, foam dressing, PT/OT consult and nutritional support Patient Safety: 
Fall Score: Total Score: 3 Interventions: assistive device (walker, cane, etc), stay with me (per policy) and gait belt High Fall Risk: Yes Length of Stay: 
Expected LOS: 8d 12h Actual LOS: 7 Sarath Young RN

## 2021-02-23 NOTE — PROGRESS NOTES
Orthopedic NP Progress Note Post Op day: 3 Days Post-Op February 23, 2021 10:44 AM  
 
Daviddenae Juarez Attending Physician: Treatment Team: Attending Provider: Gabriel Espinoza MD; Care Manager: Balta Graves, NIHARIKA; Consulting Provider: Debbie Granados; Consulting Provider: Kirsten Carlson MD; Utilization Review: Kenny Lynne, RN; Consulting Provider: Duong العلي MD; Care Manager: Navi Escalera; Utilization Review: Bella Messer RN; Consulting Provider: Tamra Ramsay MD; Charge Nurse: Jose Strickland RN Vital Signs:   
Patient Vitals for the past 8 hrs: 
 BP Temp Pulse Resp SpO2 Weight  
02/23/21 0730 (!) 133/53 98.6 °F (37 °C) (!) 103 18 100 %   
02/23/21 0400 (!) 111/95 97.5 °F (36.4 °C) 96 18 100 %   
02/23/21 0253      56.7 kg (125 lb 1.6 oz) BMI (calculated): 20.2 (02/23/21 0253) Intake/Output: 
02/23 0701 - 02/23 1900 In: -  
Out: 571 [QZWFH:014] 02/21 1901 - 02/23 0700 In: 811.7 [P.O.:240; I.V.:571.7] Out: 780 [Urine:780] Pain Control:  
Pain Assessment Pain Scale 1: Numeric (0 - 10) Pain Intensity 1: 0 
 
LAB:   
Recent Labs  
  02/23/21 
0240 HCT 22.1* HGB 7.3* Lab Results Component Value Date/Time Sodium 144 02/23/2021 02:40 AM  
 Potassium 3.6 02/23/2021 02:40 AM  
 Chloride 117 (H) 02/23/2021 02:40 AM  
 CO2 21 02/23/2021 02:40 AM  
 Glucose 81 02/23/2021 02:40 AM  
 BUN 24 (H) 02/23/2021 02:40 AM  
 Creatinine 0.72 02/23/2021 02:40 AM  
 Calcium 7.2 (L) 02/23/2021 02:40 AM  
 
 
Subjective:  Tevin Pizarro is a 82 y.o. female s/p a  Procedure(s): LEFT  HIP LUCIANA ARTHROPLASTY Procedure(s): LEFT  HIP LUCIANA ARTHROPLASTY. Tolerating diet. Pain is well managed Objective: General: alert, cooperative, no distress. Neuro/Vascular: CNS Intact. Sensation stable. Brisk cap refill, 2+ pulses UE/LE Musculoskeletal:  +ROM UE/LE. Skin: Incision - clean, dry and intact. No significant erythema or swelling. Dressing: clean, dry, and intact Marks - n Drain - n 
  
 
PT/OT:  
Gait:    
            
 
 
Assessment:  
 s/p Procedure(s): LEFT  HIP LUCIANA ARTHROPLASTY Active Problems: 
  Leukocytosis (2/16/2021) UTI (urinary tract infection) (2/16/2021) Yeast dermatitis (2/16/2021) AMS (altered mental status) (2/16/2021) Hip fracture ([address] (2/17/2021) Failure to thrive in adult (2/17/2021) NICM (nonischemic cardiomyopathy) ([address] (2/17/2021) NSVT (nonsustained ventricular tachycardia) ([address] (2/18/2021) Plan:  
-  Continue PT/OT - WBAT  
-  Continue established methods of pain control 
-  VTE Prophylaxes - TEDS &/or SCDs with heparin  
-  GI Prophylaxis - Signed By: Stephanie George NP Orthopedic Nurse Practitioner

## 2021-02-23 NOTE — PROGRESS NOTES
Problem: Mobility Impaired (Adult and Pediatric) Goal: *Acute Goals and Plan of Care (Insert Text) Description: FUNCTIONAL STATUS PRIOR TO ADMISSION: patient is a poor historian and confused. Per chart, patient has been bedbound for the last month, requiring assist for ADLS. Prior to 1 month ago, patient was ambulating with RW? 
 
HOME SUPPORT PRIOR TO ADMISSION: The patient lived with  and daughter who assist with ADLS. Physical Therapy Goals Initiated 2/21/2021 1. Patient will move from supine to sit and sit to supine , scoot up and down, and roll side to side in bed with moderate assistance  within 7 day(s). 2.  Patient will transfer from bed to chair and chair to bed with moderate assistance x 2  using the least restrictive device within 7 day(s). 3.  Patient will perform sit to stand with moderate assistance x 2 within 7 day(s). Outcome: Progressing Towards Goal 
PHYSICAL THERAPY TREATMENT Patient: Ryanne Antonio (42 y.o. female) Date: 2/23/2021 Diagnosis: AMS (altered mental status) [R41.82] UTI (urinary tract infection) [N39.0] Yeast dermatitis [B37.2] Leukocytosis [D72.829] <principal problem not specified> Procedure(s) (LRB): LEFT  HIP LUCIANA ARTHROPLASTY (Left) 3 Days Post-Op Precautions: WBAT, Fall, DNR Chart, physical therapy assessment, plan of care and goals were reviewed. ASSESSMENT Patient continues with skilled PT services and is progressing towards goals. She remains limited by confusion as to why she is in the hospital resulting in her resisting exercises and mobilization. She is also very fearful of falling. She was willing to work with PT as long as she was moved more slowly. All exercises needed aarom due to weakness and pain fear of pain which was minimal based on Face scale. Needed 2 person assist with supine to sit and sit to standing. Remained sitting on edge of bed about 14 minutes. Needed mod a initially for sitting balance and improved to cg after 7-8 minutes. Unable to fully stand due to fear and weakness. Returned to bed with max a x 2 for positioning. Current Level of Function Impacting Discharge (mobility/balance): max a x 2 supine to sitting and sit to partial stand with RW. Other factors to consider for discharge: dementia; bed bound x 2 months per daughter PLAN : 
Patient continues to benefit from skilled intervention to address the above impairments. Continue treatment per established plan of care. to address goals. Recommendation for discharge: (in order for the patient to meet his/her long term goals) Therapy up to 5 days/week in SNF setting This discharge recommendation: 
Has been made in collaboration with the attending provider and/or case management IF patient discharges home will need the following DME: bedside commode, hospital bed and wheelchair SUBJECTIVE:  
Patient stated be careful.  OBJECTIVE DATA SUMMARY:  
Critical Behavior: 
Neurologic State: Alert, Confused, Irritable Orientation Level: Oriented to person, Oriented to place, Disoriented to time, Disoriented to situation Cognition: Decreased command following, Decreased attention/concentration, Memory loss Safety/Judgement: Decreased insight into deficits, Awareness of environment, Fall prevention Functional Mobility Training: 
Bed Mobility: Rolling: Assist x2; Moderate assistance;Maximum assistance Supine to Sit: Maximum assistance;Assist x2 Sit to Supine: Total assistance;Assist x2 Scooting: Maximum assistance Transfers: 
Sit to Stand: Maximum assistance;Assist x2 Stand to Sit: Maximum assistance Bed to Chair: (deferred. unable to step) Balance: 
Sitting: Impaired Sitting - Static: Poor (constant support) Sitting - Dynamic: Poor (constant support) Standing: Impaired Standing - Static: Poor;Constant support Standing - Dynamic : Not tested Ambulation/Gait Training: 
  
  
  
  
Gait Description (WDL): (non-ambulatory) Right Side Weight Bearing: Full Left Side Weight Bearing: As tolerated Therapeutic Exercises:  
AAROM: ankle pumps, heel slides and hip abd. Pain Ratin/10 at rest L hip/thigh; 4/10 with knee/hip flexion during heel slides; no pain once back to bed and end of session. Activity Tolerance:  
Fair, SpO2 stable on RA and requires frequent rest breaks After treatment patient left in no apparent distress:  
Supine in bed, Heels elevated for pressure relief, Call bell within reach, Caregiver / family present and Side rails x 3 
 
COMMUNICATION/COLLABORATION:  
The patients plan of care was discussed with: Occupational therapist, Registered nurse and Case management. Fuentes Wesley, PT Time Calculation: 41 mins

## 2021-02-24 NOTE — PROGRESS NOTES
JENARO: 
1) [facility] 2) COVID-19 Test (within 72 hrs of DC) 3) [facility] transport 4) Follow-up Care Appointments 5) 2nd IM letter Report call to [address] [place] [address] at [address] [facility] room 319 B Pt is ready for d/c from a  stand point. RN informed. Medicare pt has received, reviewed, and signed 2nd IM letter informing them of their right to appeal the discharge. Signed copy has been placed on pt bedside chart. CM spoke to pt's  and pt and they are aware that pt will be d/c to [address] [place] [address] today about 1:00 pm.  
 
CM spoke to [facility] [phone] and give her the auth number [id number] 3 day update due on Feb. 25, 2021 to [facility]. fax number [phone]. Care Management Interventions PCP Verified by CM: Yes Last Visit to PCP: 11/09/20 Mode of Transport at Discharge: BLS([facility] to transport at d/c) Hospital Transport Time of Discharge: 1300 Transition of Care Consult (CM Consult): SNF(Pt is going [facility]) Partner SNF: Yes Physical Therapy Consult: Yes Occupational Therapy Consult: Yes Speech Therapy Consult: Yes Current Support Network: Lives with Spouse(Patient's daughters lives with her ) Confirm Follow Up Transport: Other (see comment) The Plan for Transition of Care is Related to the Following Treatment Goals : PT, OT, SLP evals pending disposition The Patient and/or Patient Representative was Provided with a Choice of Provider and Agrees with the Discharge Plan?: Yes Name of the Patient Representative Who was Provided with a Choice of Provider and Agrees with the Discharge Plan: Pt's spouse Freedom of Choice List was Provided with Basic Dialogue that Supports the Patient's Individualized Plan of Care/Goals, Treatment Preferences and Shares the Quality Data Associated with the Providers?: Yes [employer] Information Provided?: No 
Discharge Location Discharge Placement: Skilled nursing facility [facility] 
 Care Manager ED [facility] 
[phone]

## 2021-02-24 NOTE — PROGRESS NOTES
1900: Bedside and Verbal shift change report given to Brianna Burrell and MARISOL Belcher (oncoming nurse) by Shaila Montano RN (offgoing nurse). Report included the following information SBAR, Kardex, Intake/Output, MAR, Recent Results and Cardiac Rhythm NSR.  
 
2115: attempted to give pt pill in applesauce, after some convincing pt opened her mouth, but then later spit pill back out. Pt agreed to try chocolate ice cream, and pt did not spit pill back out. 0000: Pt wouldn't open mouth for tylenol. Tylenol marked as refused. 0530: Pt had episode of bloody bright red stool. Pt refusing tylenol. Clenching mouth shut and refusing to talk to staff.  
 
0700: End of Shift Note Bedside shift change report given to Bartolo BUNDY (oncoming nurse) by Babak Castellano RN (offgoing nurse). Report included the following information SBAR, Kardex, Intake/Output, MAR, Recent Results and Cardiac Rhythm NSR Shift worked:  [id number] Shift summary and any significant changes:  
  Pt refusing most PO meds. Needs work with PT/OT Concerns for physician to address:  bloody stool. Zone phone for oncoming shift:    
  
 
Activity: 
Activity Level: Bed Rest 
Number times ambulated in hallways past shift: 0 Number of times OOB to chair past shift: 0 Cardiac:  
Cardiac Monitoring: Yes     
Cardiac Rhythm: Normal sinus rhythm Access:  
Current line(s): PIV and midline Genitourinary:  
Urinary status: unger Respiratory:  
O2 Device: Room air Chronic home O2 use?: NO Incentive spirometer at bedside: NO 
  
GI: 
Last Bowel Movement Date: 02/22/21 Current diet:  DIET NUTRITIONAL SUPPLEMENTS Breakfast, Lunch, Dinner; Ensure Clear DIET NUTRITIONAL SUPPLEMENTS Lunch; Paige-Deshaun DIET NUTRITIONAL SUPPLEMENTS Dinner; Ensure Pudding DIET DYSPHAGIA MECH ALTERED (NDD2) Consistent Carb 1800kcal 
Passing flatus: YES Tolerating current diet: YES 
% Diet Eaten: 25 % Pain Management:  
Patient states pain is manageable on current regimen: YES 
 
 Skin: 
Jake Score: 15 Interventions: turn team, float heels and internal/external urinary devices Patient Safety: 
Fall Score: Total Score: 4 Interventions: bed/chair alarm and pt to call before getting OOB High Fall Risk: Yes Length of Stay: 
Expected LOS: 8d 12h Actual LOS: 8 Florencio Gilford, RN
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
## 2021-02-24 NOTE — PROGRESS NOTES
Hospital to [address] [place] [address] Road 82 y.o.   female [facility]   Room: 2261/01    [facility]  Unit Phone# :  [address] 
[facility] 
[address] [facility] [address] Dept: [phone] Loc: [id number] SITUATION Admitted:  2/15/2021         Attending Provider:  Gena Bustillos MD    
 
Consultations:  IP CONSULT TO ORTHOPEDIC SURGERY 
IP CONSULT TO CARDIOLOGY 
IP CONSULT TO NEPHROLOGY 
 
PCP:  John Moon MD   [phone] Treatment Team: Attending Provider: Gena Bustillos MD; Care Manager: Karishma Potts, RN; Consulting Provider: Kristen June; Consulting Provider: Barbara Singh MD; Utilization Review: Jaylan Lieberman RN; Consulting Provider: Jojo Reece MD; Care Manager: Cassia Chow; Utilization Review: Carolin Spring RN; Charge Nurse: Shelia Ford RN; Primary Nurse: Gibson Argueta RN Admitting Dx:  AMS (altered mental status) [R41.82] UTI (urinary tract infection) [N39.0] Yeast dermatitis [B37.2] Leukocytosis [D72.829] Principal Problem: <principal problem not specified> 
 
4 Days Post-Op of  
Procedure(s): LEFT  HIP LUCIANA ARTHROPLASTY BY: Jojo Reece MD             ON: 2/20/2021 Code Status: DNR Advance Directives:  
Advance Care Planning 2/20/2021 Confirm Advance Directive None Patient Would Like to Complete Advance Directive Unable (Send w/patient) Not Received Isolation:  There are currently no Active Isolations       MDRO: No current active infections Pain Medications given:  N/A    Last dose:  at   
 
Special Equipment needed: no  Type of equipment: 
 
 
(Not currently on dialysis) (Not currently on dialysis) (Not currently on dialysis) BACKGROUND Allergies: 
No Known Allergies Past Medical History:  
Diagnosis Date  Anemia  CHF (congestive heart failure) ([facility] 4/15/2011  CRI (chronic renal insufficiency)  Dementia ([facility]  Diabetes ([facility]  Diastolic CHF, acute ([facility] 3/19/2011  Hypertension  Leukocytosis  Pleural effusion  SVT (supraventricular tachycardia) (HCC)  UTI (urinary tract infection)  V-tach ([facility]  Yeast dermatitis History reviewed. No pertinent surgical history. Medications Prior to Admission Medication Sig  
 amLODIPine (NORVASC) 5 mg tablet TAKE 1 TABLET EVERY DAY  atorvastatin (LIPITOR) 10 mg tablet TAKE 1 TABLET EVERY NIGHT  carvediloL (COREG) 25 mg tablet Take 1 Tab by mouth two (2) times daily (with meals).  SITagliptin (JANUVIA) 100 mg tablet Take 1 Tab by mouth daily.  metFORMIN (GLUCOPHAGE) 1,000 mg tablet TAKE 1 TABLET TWICE DAILY  furosemide (LASIX) 20 mg tablet TAKE 1 TABLET DAILY  ergocalciferol (ERGOCALCIFEROL) 50,000 unit capsule Take 1 Cap by mouth every seven (7) days.  glucose blood VI test strips (ACCU-CHEK RISSA PLUS TEST STRP) strip Check glucose twice daily  alcohol swabs (BD SINGLE USE SWABS REGULAR) padm Daily  aspirin delayed-release 81 mg tablet Take  by mouth daily. Hard scripts included in transfer packet no Vaccinations:   
Immunization History Administered Date(s) Administered  (RETIRED) Pneumococcal Vaccine (Unspecified Type) 03/20/2011  TD Vaccine 04/05/2011 Readmission Risks:    Known Risks: CHF The Charlson CoMorbitiy Index tool is an evidenced based tool that has more automatic generated information. The tool looks at many different items such as the age of the patient, how many times they were admitted in the last calendar year, current length of stay in the hospital and their diagnosis. All of these items are pulled automatically from information documented in the chart from various places and will generate a score that predicts whether a patient is at low (less than 13), medium (13-20) or high (21 or greater) risk of being readmitted. ASSESSMENT Temp: 98.7 °F (37.1 °C) (02/24/21 1059) Pulse (Heart Rate): (!) 105 (02/24/21 1059) Resp Rate: 20 (02/24/21 1059)           BP: 137/72 (02/24/21 1059) O2 Sat (%): 100 % (02/24/21 1059) Weight: 59.7 kg (131 lb 9.6 oz)    Height: 5' 6\" (167.6 cm) (02/22/21 1154) If above not within 1 hour of discharge: 
 
BP:_____  P:____  R:____ T:_____ O2 Sat: ___%  O2: ______ Active Orders Diet DIET DYSPHAGIA MECH ALTERED (NDD2) Consistent Carb 1800kcal  
   
 
Orientation: only aware of  person Active Behaviors: None Active Lines/Drains:  (Peg Tube / Marks / CL or S/L?): no 
 
     Last BM: Last Bowel Movement Date: 02/24/21 Skin Integrity: Excoriation, Wound (add Wound LDA) Mobility: Very limited Weight Bearing Status: WBAT (Weight Bearing as Tolerated) Lab Results Component Value Date/Time Glucose 98 02/24/2021 03:35 AM  
 Hemoglobin A1c 5.4 02/15/2021 09:56 PM  
 INR 1.2 (H) 03/19/2011 06:55 PM  
 HGB 8.0 (L) 02/24/2021 03:35 AM  
 HGB 7.3 (L) 02/23/2021 02:40 AM  
  
  RECOMMENDATION See After Visit Summary (AVS) for: · Discharge instructions · [facility] · Special equipment needed (entered pre-discharge by Care Management) · Medication Reconciliation · Follow up Appointment(s) Report given/sent by: Nasim Watt RN Verbal report given to: Fariba  FAXED to:     
    
Estimated discharge time:  2/24/2021 at 1300

## 2021-02-25 NOTE — PROGRESS NOTES
Transition of care outreach postponed for 14 days due to patient's discharge to SNF. [place] [address] 2/15-2/24/21 leukocytosis D/C [facility] f/u 14d

## 2021-03-04 NOTE — PROGRESS NOTES
Post Acute Facility Update *The information contained in this note was received during a weekly care coordination call with the post acute facility* Current Facility: [address] (SNF) Anticipated Discharge Date: C[facility] Nursing/SLP update:  Soft and bite size diet with thin liquids, poor intake, mild oropharyngeal dysphagia Facility Social Work Update: no current updates Upper Extremity: Maximum Assistance Lower Extremity: Dependent Bed Mobility: Maximum Assistance Transfers: Dependent Ambulation: Dependent How far (in feet) is the patient ambulating? 0 Does patient use an assistive device: no If yes, device used: N/A Barriers to Discharge: CODY BLANKENSHIPN, RN

## 2021-03-12 NOTE — TELEPHONE ENCOUNTER
Patient needs an In Office appt for Hospital F/U/[facility] Discharge on 3/13/21 & needs within 2 weeks. No Availability for PSR. Please call patient to schedule in time frame .  Thank you

## 2021-03-12 NOTE — PROGRESS NOTES
Chief Complaint   Patient presents with   [facility] Follow Up    CHF    Hypertension     1. Have you been to the ER, urgent care clinic since your last visit? No Hospitalized since your last visit? Yes 2021    2. Have you seen or consulted any other health care providers outside of the [address] [place] [address] since your last visit? Yes [address] any pap smears or colon screening. No    Patient from [facility] she has no family with her only papers from [facility] she voices no complaints was able to state name and  . Unable to answer questions for screening she doesn't know.

## 2021-03-12 NOTE — PROGRESS NOTES
[address] [place] [address], [address] [facility]  [phone]     Subjective:      Vivek Riley is a 82 y.o. female is here for hospital follow up. Admitted last month for AMS. Cardiology consulted for NICM history with hip fracture. She is s/p left hip repair performed by Dr Ella Abel. She was dc at [facility] for rehab. Patient denies any cardiac complaints. Daughter in room to help answer questions. Nursing home reports HR 90s to low 100s. The patient denies chest pain/ shortness of breath, orthopnea, PND, LE edema, palpitations, syncope, or presyncope. Echo 2/2021  · LV: Estimated LVEF is 30 - 35%. Visually measured ejection fraction. Normal cavity size. Upper normal wall thickness. Moderately reduced systolic function. Left ventricular diastolic dysfunction.          Patient Active Problem List    Diagnosis Date Noted    NSVT (nonsustained ventricular tachycardia) ([address] 02/18/2021    Hip fracture ([address] 02/17/2021    Failure to thrive in adult 02/17/2021    NICM (nonischemic cardiomyopathy) ([address] 02/17/2021    Leukocytosis 02/16/2021    UTI (urinary tract infection) 02/16/2021    Yeast dermatitis 02/16/2021    AMS (altered mental status) 02/16/2021    Iron deficiency anemia 08/09/2016    Type II or unspecified type diabetes mellitus without mention of complication, not stated as uncontrolled 05/30/2012    Pulmonary nodules 04/29/2011    CHF (congestive heart failure) ([address] 04/15/2011    HTN (hypertension), malignant 03/19/2011    DM (diabetes mellitus), type 2, uncontrolled ([address] 03/19/2011    Unspecified pleural effusion 03/19/2011      Caesar Dennis MD  Past Medical History:   Diagnosis Date    Anemia     CHF (congestive heart failure) ([address] 4/15/2011    CRI (chronic renal insufficiency)     Dementia (HCC)     Diabetes (HCC)     Diastolic CHF, acute ([address] 3/19/2011    Hypertension     Leukocytosis     Pleural effusion     [facility]) [facility]     UTI (urinary tract infection)     V-tach ([facility]     Yeast dermatitis       No past surgical history on file. No Known Allergies   Family History   Problem Relation Age of Onset    Heart Disease Father     Hypertension Sister     Diabetes Sister     Asthma Sister     Diabetes Brother     Hypertension Brother     Hypertension Sister     Heart Disease Sister     Diabetes Brother     Hypertension Brother       Social History     Socioeconomic History    Marital status:      Spouse name: Not on file    Number of children: Not on file    Years of education: Not on file    Highest education level: Not on file   Occupational History    Not on file   Social Needs    Financial resource strain: Not on file    Food insecurity     Worry: Not on file     Inability: Not on file   [employer] needs     Medical: Not on file     Non-medical: Not on file   Tobacco Use    Smoking status: Never Smoker    Smokeless tobacco: Never Used   Substance and Sexual Activity    Alcohol use: Not Currently    Drug use: Not Currently    Sexual activity: Not Currently   Lifestyle    Physical activity     Days per week: Not on file     Minutes per session: Not on file    Stress: Not on file   Relationships    Social connections     Talks on phone: Not on file     Gets together: Not on file     Attends Mandaeism service: Not on file     Active member of club or organization: Not on file     Attends meetings of clubs or organizations: Not on file     Relationship status: Not on file    Intimate partner violence     Fear of current or ex partner: Not on file     Emotionally abused: Not on file     Physically abused: Not on file     Forced sexual activity: Not on file   Other Topics Concern    Not on file   Social History Narrative    Not on file      Current Outpatient Medications   Medication Sig    acetaminophen (TYLENOL) 650 mg TbER Take 650 mg by mouth every eight (8) hours.     cyanocobalamin 1,000 mcg tablet Take 1,000 mcg by mouth daily.  multivitamin (Daily Multiple) tablet Take 1 Tab by mouth daily.  collagenase (SANTYL) 250 unit/gram ointment Apply  to affected area daily.  ferrous sulfate (Slow Fe) 142 mg (45 mg iron) ER tablet Take  by mouth Daily (before breakfast).  furosemide (LASIX) 40 mg tablet Take 1 Tab by mouth daily. TAKE 1 TABLET  BID x 3 days then 1 tab daily    carvediloL (COREG) 25 mg tablet Take 1.5 Tabs by mouth two (2) times daily (with meals).  atorvastatin (LIPITOR) 10 mg tablet TAKE 1 TABLET EVERY NIGHT    SITagliptin (JANUVIA) 100 mg tablet Take 1 Tab by mouth daily.  metFORMIN (GLUCOPHAGE) 1,000 mg tablet TAKE 1 TABLET TWICE DAILY    ergocalciferol (ERGOCALCIFEROL) 50,000 unit capsule Take 1 Cap by mouth every seven (7) days.  glucose blood VI test strips (ACCU-CHEK RISSA PLUS TEST STRP) strip Check glucose twice daily    alcohol swabs (BD SINGLE USE SWABS REGULAR) padm Daily    aspirin delayed-release 81 mg tablet Take  by mouth daily. No current facility-administered medications for this visit. Review of Symptoms:  11 systems reviewed, negative other than as stated in the HPI    Physical ExamPhysical Exam:    Vitals:    03/12/21 0951   BP: 120/60   Pulse: 100   Resp: 16   SpO2: 97%   Height: 5' 6\" (1.676 m)     Body mass index is 21.24 kg/m². General PE  Gen:  NAD  Mental Status - Alert. General Appearance - Not in acute distress. HEENT:  PERRL, no carotid bruits or JVD  Chest and Lung Exam   Inspection: Accessory muscles - No use of accessory muscles in breathing. Auscultation:   Breath sounds: - Normal.   Cardiovascular   Inspection: Jugular vein - Bilateral - Inspection Normal.   Palpation/Percussion:   Apical Impulse: - Normal.   Auscultation: Rhythm - Regular. Heart Sounds - S1 WNL and S2 WNL. No S3 or S4. Murmurs & Other Heart Sounds: Auscultation of the heart reveals - No Murmurs.    Peripheral Vascular Upper Extremity: Inspection - Bilateral - No Cyanotic nailbeds or Digital clubbing. Lower Extremity:   Palpation: Edema - Bilateral - + 2 edema   Abdomen:   Soft, non-tender, bowel sounds are active. Neuro: A&O times 3, CN and motor grossly WNL    Labs:   Lab Results   Component Value Date/Time    Cholesterol, total 140 01/18/2017 10:36 AM    Cholesterol, total 143 04/27/2016 09:50 AM    Cholesterol, total 132 09/16/2015 01:46 PM    Cholesterol, total 145 08/13/2014 10:07 AM    Cholesterol, total 148 02/06/2014 09:26 AM    HDL Cholesterol 76 01/18/2017 10:36 AM    HDL Cholesterol 83 04/27/2016 09:50 AM    HDL Cholesterol 76 09/16/2015 01:46 PM    HDL Cholesterol 69 08/13/2014 10:07 AM    HDL Cholesterol 68 02/06/2014 09:26 AM    LDL, calculated 53 01/18/2017 10:36 AM    LDL, calculated 50 04/27/2016 09:50 AM    LDL, calculated 41 09/16/2015 01:46 PM    LDL, calculated 62 08/13/2014 10:07 AM    LDL, calculated 63 02/06/2014 09:26 AM    Triglyceride 53 01/18/2017 10:36 AM    Triglyceride 51 04/27/2016 09:50 AM    Triglyceride 75 09/16/2015 01:46 PM    Triglyceride 72 08/13/2014 10:07 AM    Triglyceride 84 02/06/2014 09:26 AM    CHOL/HDL Ratio 3.0 03/20/2011 04:20 AM     Lab Results   Component Value Date/Time     02/17/2021 10:54 AM     Lab Results   Component Value Date/Time    Sodium 141 02/24/2021 03:35 AM    Potassium 3.5 02/24/2021 03:35 AM    Chloride 113 (H) 02/24/2021 03:35 AM    CO2 21 02/24/2021 03:35 AM    Anion gap 7 02/24/2021 03:35 AM    Glucose 98 02/24/2021 03:35 AM    BUN 17 02/24/2021 03:35 AM    Creatinine 0.58 02/24/2021 03:35 AM    BUN/Creatinine ratio 29 (H) 02/24/2021 03:35 AM    GFR est AA >60 02/24/2021 03:35 AM    GFR est non-AA >60 02/24/2021 03:35 AM    Calcium 7.0 (L) 02/24/2021 03:35 AM    Bilirubin, total 0.4 02/20/2021 06:05 AM    Alk.  phosphatase 69 02/20/2021 06:05 AM    Protein, total 5.5 (L) 02/20/2021 06:05 AM    Albumin 2.0 (L) 02/22/2021 02:27 AM    Globulin 4.1 (H) 02/20/2021 06:05 AM    A-G Ratio 0.3 (L) 02/20/2021 06:05 AM    ALT (SGPT) 13 02/20/2021 06:05 AM       EKG:  ST     Assessment:     Assessment:      1. Chronic combined systolic and diastolic congestive heart failure ([address]    2. Essential hypertension    3. NSVT (nonsustained ventricular tachycardia) ([address]    4. Coronary artery disease due to lipid rich plaque    5. Mixed hyperlipidemia        Orders Placed This Encounter    METABOLIC PANEL, BASIC     Standing Status:   Future     Standing Expiration Date:   3/12/2022    MAGNESIUM     Standing Status:   Future     Standing Expiration Date:   3/12/2022    AMB POC EKG ROUTINE W/ 12 LEADS, INTER & REP     Order Specific Question:   Reason for Exam:     Answer:   routine    acetaminophen (TYLENOL) 650 mg TbER     Sig: Take 650 mg by mouth every eight (8) hours.  cyanocobalamin 1,000 mcg tablet     Sig: Take 1,000 mcg by mouth daily.  multivitamin (Daily Multiple) tablet     Sig: Take 1 Tab by mouth daily.  collagenase (SANTYL) 250 unit/gram ointment     Sig: Apply  to affected area daily.  ferrous sulfate (Slow Fe) 142 mg (45 mg iron) ER tablet     Sig: Take  by mouth Daily (before breakfast).  furosemide (LASIX) 40 mg tablet     Sig: Take 1 Tab by mouth daily. TAKE 1 TABLET  BID x 3 days then 1 tab daily     Dispense:  90 Tab     Refill:  1    carvediloL (COREG) 25 mg tablet     Sig: Take 1.5 Tabs by mouth two (2) times daily (with meals). Dispense:  90 Tab     Refill:  1        Plan:     Chronic HFrEF/NICM:    EF 30-35% here, which is an improvement from prior  Less PVCs/NSVT - short burst of atrial ectopy during hospital admission 2/2021  Increase Carvedilol 37.5 mg BID for HR control. DC amlodipine  Will increase her lasix to 40 mg BID x 3 days then 40 mg daily  recheck labs in 2 weeks   Will consider ace/arb at f/u if bp and kidney fxn ok  Patient would not be a lifevest candidate due to her mental status/ability to use the device. CAD  Moderate 2 vessel CAD per cardiac cath in 2011  Troponin negative during hospital admission 2/2021  On ASA BB statin    Bp controlled    HLD  On statin Lipids and labs followed by PCP      S/p Left hip hemiarthroplasty 2/20/2021      Continue current care and f/u in 1 mos      Alessandra Hoover NP       [facility]    3/12/2021         Patient seen, examined by me personally. Plan discussed as detailed. Agree with note as outlined by  NP. I confirm findings in history and physical exam. My independent exam reveals : Physical Exam   Constitutional: She appears well-developed. Cardiovascular: Regular rhythm and normal heart sounds. Exam reveals no gallop. No murmur heard. Pulmonary/Chest: Effort normal and breath sounds normal. No respiratory distress. She has no wheezes. She has no rales. Musculoskeletal:         General: Edema present. Neurological: She is alert. Skin: Skin is warm. Psychiatric: She has a normal mood and affect. Has 2+ edema. Denies SOB, PND, orthopnea. Tachycardic. Will increase diuretic, coreg. Discontinue amlodipine. Not on ACEI due to ARF. Repeat labs. F/u in a month. No additional findings noted. Agree with plan as outlined above with modifications as noted.      Jane Swift MD

## 2021-03-12 NOTE — PROGRESS NOTES
Transition of care outreach postponed for 14 days due to patient's discharge to SNF. D/C to [address] [facility] 2/24/21 f/u 14d

## 2021-03-12 NOTE — LETTER
3/12/2021 Patient: Maritza Mark YOB: 1935 Date of Visit: 3/12/2021 Tarsha Sheehan MD 
[address] [id number] [address] [employer] Dear Tarsha Sheehan MD, Thank you for referring Ms. Agatha Garcia to [facility] for evaluation. My notes for this consultation are attached. If you have questions, please do not hesitate to call me. I look forward to following your patient along with you. Sincerely, Marco Antonio Lacy MD
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
## 2021-03-19 NOTE — PROGRESS NOTES
Post Acute Facility Update *The information contained in this note was received during a weekly care coordination call with the post acute facility* Current Facility: [address] (SNF) Anticipated Discharge Date: TBD Facility Nursing Update: no current updates Facility Social Work Update: no current updates Upper Extremity Assistance: Maximum Assistance Lower Extremity Assistance: Dependent Bed Mobility: Dependent Transfers: Maximum Assistance Ambulation: Dependent How far (in feet) is the patient ambulating? 0 Device Used: none Barriers to Discharge: TBD

## 2021-03-19 NOTE — PROGRESS NOTES
[address] [place] [address] Dr Discharge Note *The information contained in this note was received during a weekly care coordination call with the post acute facility* Patient was discharged from [address] [place] [address] ([facility]) on 3/13/2021. PCP: MD CALVIN Prince appointment:  
Future Appointments Date Time Provider Stephen Walden 3/19/2021  1:15 PM John Moon MD [facility] BS AMB  
4/22/2021  2:30 PM Tanvir Miles MD [facility] BS AMB Home Health/Outpatient orders at discharge: home health care Home Health company: [employer] Durable Medical Equipment ordered at discharge: none [facility]: N/A Durable Medical Equipment received prior to discharge: N/A 
 
LPN Care Coordinator managing patient: AZRA Castro Pending sale to [facility] Care Team will sign off at this time. Medications were not reconciled and general patient assessment was not completed during this skilled nursing facility outreach.

## 2021-03-22 NOTE — PROGRESS NOTES
Transition of care outreach postponed for 14 days due to patient's discharge to SNF 
D/C to [facility] 2/24 to home 3/13/21  on home number f/u 2 d

## 2021-03-22 NOTE — TELEPHONE ENCOUNTER
----- Message from Saurav Porter sent at 3/22/2021  1:39 PM EDT -----  Regarding: Medication Discussion  Contact: [phone]  General Message/Vendor Calls    Caller's first and last name: Gill from [facility]. Reason for call: Requesting to have an update on medication dosage. Callback required yes/no and why: Yes,discussion of medication dosage. Best contact number(s):[phone]       Details to clarify the request: Gill from [facility] [employer] advising that patient is taking Tylenol 600 mg twice a day and needing lower dosage or alternative medication as patient dosage is too high. Please call to provide alternative dosage or alternative medication for patient.       Message from [facility]

## 2021-03-23 NOTE — TELEPHONE ENCOUNTER
Left vm for [facility]/ [facility] that per Dr. Shawn Taveras, pt should Not take any NSAIDS; can continue Tylenol prn. Call back prn.

## 2021-03-23 NOTE — TELEPHONE ENCOUNTER
MD Carolyn Magallanes LPN   Caller: Unspecified (Yesterday,  1:44 PM)             600mg bid tylenol is fine.

## 2021-03-23 NOTE — TELEPHONE ENCOUNTER
Received call from [facility]. Gill states that pt's daughter reports that the tylenol pt was taking made her sleepy/drowsy which Gill noted at last [facility] visit--tylenol since stopped by daughter. Gill states that the pt looked better. Gill asking if pt could do a prn Aleve for any aches/pains. Gill informed Dr. Carissa Johansen will be consulted for further recommendations.

## 2021-03-25 NOTE — PROGRESS NOTES
[address] [place] [address] Dr Discharge Follow-Up Date/Time:  3/25/2021 11:50 AM 
 
Patient was admitted to [facility] on 2/15/21 and discharged to [address] [place] [address] on 21. The patients discharge diagnosis was leukocytosis. Patient was discharge on 3/13/21 from [address] [place] [address]. The discharge summary from the post acute facilty was not available at the time of outreach. Patient was contacted within 9 business days of discharge from the post acute facility. N Care Coordinator contacted the family   Merry Adkins (not on PHI advised to fill one out, will mail one to the patient). by telephone to perform post hospital discharge follow up. Verified name and  with family as identifiers. Provided introduction to self, and explanation of the LPN Care Coordinator role. Family received post acute facility discharge instructions. LPN Care Coordinator reviewed discharge instructions and red flags with family who verbalized understanding. Family given an opportunity to ask questions and does not have any further questions or concerns at this time. The family agrees to contact the PCP office for questions related to their healthcare. N Care Coordinator provided contact information for future reference. Merry Adkins states his wife was seen by PCP a VV 3/19/24 and medications were reviewed by Dr Ajay Hart. Advance Care Planning:  
Does patient have an Advance Directive:  not on file Home Health orders at discharge: PT, OT, SN [address]: [facility] sent Date of initial visit: 3/15/21 Durable Medical Equipment ordered at discharge: none [address] [facility] [address]: n/a Durable Medical Equipment received: n/a Medication(s):  
The post acute facility medication discharge list was not available for this call. Medication review was not  performed with family. Medications were reviewed with PCP 3/19/21. [facility] follow up appointment(s):  
Future Appointments Date Time Provider Stephen Iram 4/22/2021  2:30 PM Tanvir Miles MD [facility] BS AMB [facility] follow up appointment(s): n/a [facility]:  information provided as a resource

## 2021-03-26 NOTE — TELEPHONE ENCOUNTER
[facility] Nurse states she needs a call back today in reference to Patient's Wound on Sacrum has gotten Much Worse & needs to discuss Urgent Plan of Care. Please call Asap.  Thank you

## 2021-03-28 NOTE — TELEPHONE ENCOUNTER
Please send pt to ed for worsening wound    And labs worsening, hemoglobin down to 7.2 and wbc climbing

## 2021-03-29 PROBLEM — L08.9 INFECTED ULCER OF SKIN (HCC): Status: ACTIVE | Noted: 2021-01-01

## 2021-03-29 PROBLEM — L98.499 INFECTED ULCER OF SKIN (HCC): Status: ACTIVE | Noted: 2021-01-01

## 2021-03-29 NOTE — ED NOTES
Bedside shift change report given to Kaykay  (oncoming nurse) by Alie Marroquin (offgoing nurse). Report included the following information SBAR, Kardex, ED Summary, [facility] and Recent Results.

## 2021-03-29 NOTE — TELEPHONE ENCOUNTER
Gill//Sirena Owusu [facility] is calling to advise that patient has been taken to [facility] this morning. Please call if any questions. Thank you.

## 2021-03-29 NOTE — ED PROVIDER NOTES
EMERGENCY DEPARTMENT HISTORY AND PHYSICAL EXAM      Date: 3/29/2021  Patient Name: Miguelina Barrett    History of Presenting Illness     Chief Complaint   Patient presents with    Abnormal Lab Results     rescue arrives with pt for abdnormal labs the Dr. Irene Henderson wants pt to ED for evaluation; pt has dementia pt is bedridden at home. pt has decubitus ulcer; pt had left hip replacement as is non ambulatory. History Provided By: Patient's Daughter    HPI: Miguelina Barrett, 82 y.o. female with history of hypertension, diabetes, CHF, dementia, bedbound ever since she suffered fractured hip few months ago presents to the ED with cc of failure to thrive, poor p.o. intake. Patient sent to the ED by her PCP for evaluation for failure to thrive and concern for wound infection. Per daughter, patient has chronic decubitus sacral ulcer that has been enlarging in size per home health nurse. No purulent drainage or foul smelling drainage, however there is concern that this wound is getting worse and patient's white count has been climbing and PCP became concerned and sent her over for evaluation. Per daughter, patient has been having poor p.o. intake and has only had a few sips of liquid per day, she is worried that she is getting dehydrated like she did in February when she required admission. Patient lives at home with her daughter who cares for her, they do have home health to help take care of them. Patient has had mild cough but has no complaints of chest pain, shortness of breath, abdominal pain, nausea, vomiting. Daughter does not endorse any urinary or bowel changes. Decubitus wound worsening over past few days, enlarging with eschar, no purulent drainage or foul odor    There are no other complaints, changes, or physical findings at this time. PCP: Bebeto Saucedo MD    No current facility-administered medications on file prior to encounter.       Current Outpatient Medications on File Prior to Encounter   Medication Sig Dispense Refill    cyanocobalamin 1,000 mcg tablet Take 1 Tab by mouth daily. 90 Tab 1    furosemide (LASIX) 40 mg tablet Take 1 Tab by mouth daily. daily 90 Tab 1    Accu-Chek Softclix Lancets misc TEST BLOOD SUGAR EVERY  Each 2    acetaminophen (TYLENOL) 650 mg TbER Take 650 mg by mouth every eight (8) hours.  multivitamin (Daily Multiple) tablet Take 1 Tab by mouth daily.  collagenase (SANTYL) 250 unit/gram ointment Apply  to affected area daily.  ferrous sulfate (Slow Fe) 142 mg (45 mg iron) ER tablet Take  by mouth Daily (before breakfast).  carvediloL (COREG) 25 mg tablet Take 1.5 Tabs by mouth two (2) times daily (with meals). 90 Tab 1    atorvastatin (LIPITOR) 10 mg tablet TAKE 1 TABLET EVERY NIGHT 90 Tab 1    SITagliptin (JANUVIA) 100 mg tablet Take 1 Tab by mouth daily. 30 Tab 3    metFORMIN (GLUCOPHAGE) 1,000 mg tablet TAKE 1 TABLET TWICE DAILY 180 Tab 0    ergocalciferol (ERGOCALCIFEROL) 50,000 unit capsule Take 1 Cap by mouth every seven (7) days. 8 Cap 0    glucose blood VI test strips (ACCU-CHEK RISSA PLUS TEST STRP) strip Check glucose twice daily 3 Package 3    alcohol swabs (BD SINGLE USE SWABS REGULAR) padm Daily 100 each 3    aspirin delayed-release 81 mg tablet Take  by mouth daily. Past History     Past Medical History:  Past Medical History:   Diagnosis Date    Anemia     [facility]est[facility] 4/15/2011    CRI (chronic renal insufficiency)     D[facility]     Diabetes ([facility]     Diastolic CHF, acute ([facility] 3/19/2011    Hypertension     Leukocytosis     Pleural effusion     SVT (supraventricular tachycardia) ([facility]     UTI (urinary tract infection)     V-tach ([facility]     Yeast dermatitis        Past Surgical History:  No past surgical history on file.     Family History:  Family History   Problem Relation Age of Onset    Heart Disease Father     Hypertension Sister     Diabetes Sister    Lucille Asthma Sister     Diabetes Brother     Hypertension Brother     Hypertension Sister     Heart Disease Sister     Diabetes Brother     Hypertension Brother        Social History:  Social History     Tobacco Use    Smoking status: Never Smoker    Smokeless tobacco: Never Used   Substance Use Topics    Alcohol use: Not Currently    Drug use: Not Currently       Allergies:  No Known Allergies      Review of Systems   Review of Systems   Unable to perform ROS: Dementia       Physical Exam   Physical Exam  Vitals signs and nursing note reviewed. Constitutional:       General: She is not in acute distress. Appearance: Normal appearance. She is not ill-appearing, toxic-appearing or diaphoretic. Comments: Patient is lying in bed, sleeping, resting comfortably   HENT:      Head: Normocephalic and atraumatic. Mouth/Throat:      Mouth: Mucous membranes are dry. Eyes:      Extraocular Movements: Extraocular movements intact. Pupils: Pupils are equal, round, and reactive to light. Neck:      Musculoskeletal: Normal range of motion and neck supple. Cardiovascular:      Rate and Rhythm: Normal rate and regular rhythm. Heart sounds: Normal heart sounds. No murmur. Pulmonary:      Effort: Pulmonary effort is normal. No respiratory distress. Breath sounds: Normal breath sounds. No wheezing. Abdominal:      Palpations: Abdomen is soft. Tenderness: There is no abdominal tenderness. There is no guarding or rebound. Musculoskeletal: Normal range of motion. Skin:     General: Skin is warm and dry. Findings: No erythema or rash. Comments: Large sacral wound stage IV with active drainage and large eschar. Neurological:      General: No focal deficit present. Mental Status: She is alert. Mental status is at baseline.          Diagnostic Study Results     Labs -     Recent Results (from the past 12 hour(s))   METABOLIC PANEL, COMPREHENSIVE    Collection Time: 03/29/21 12:46 PM   Result Value Ref Range    Sodium 147 (H) 136 - 145 mmol/L    Potassium 3.4 (L) 3.5 - 5.1 mmol/L    Chloride 117 (H) 97 - 108 mmol/L    CO2 24 21 - 32 mmol/L    Anion gap 6 5 - 15 mmol/L    Glucose 92 65 - 100 mg/dL    BUN 21 (H) 6 - 20 MG/DL    Creatinine 0.90 0.55 - 1.02 MG/DL    BUN/Creatinine ratio 23 (H) 12 - 20      GFR est AA >60 >60 ml/min/1.73m2    GFR est non-AA 60 (L) >60 ml/min/1.73m2    Calcium 8.2 (L) 8.5 - 10.1 MG/DL    Bilirubin, total 0.3 0.2 - 1.0 MG/DL    ALT (SGPT) 9 (L) 12 - 78 U/L    AST (SGOT) 17 15 - 37 U/L    Alk. phosphatase 87 45 - 117 U/L    Protein, total 7.0 6.4 - 8.2 g/dL    Albumin 1.7 (L) 3.5 - 5.0 g/dL    Globulin 5.3 (H) 2.0 - 4.0 g/dL    A-G Ratio 0.3 (L) 1.1 - 2.2     SAMPLES BEING HELD    Collection Time: 03/29/21 12:46 PM   Result Value Ref Range    SAMPLES BEING HELD BLUE     COMMENT        Add-on orders for these samples will be processed based on acceptable specimen integrity and analyte stability, which may vary by analyte. CBC WITH AUTOMATED DIFF    Collection Time: 03/29/21 12:46 PM   Result Value Ref Range    WBC 15.8 (H) 3.6 - 11.0 K/uL    RBC 2.86 (L) 3.80 - 5.20 M/uL    HGB 8.3 (L) 11.5 - 16.0 g/dL    HCT 26.9 (L) 35.0 - 47.0 %    MCV 94.1 80.0 - 99.0 FL    MCH 29.0 26.0 - 34.0 PG    MCHC 30.9 30.0 - 36.5 g/dL    RDW 16.7 (H) 11.5 - 14.5 %    PLATELET 629 (H) 818 - 400 K/uL    MPV 10.2 8.9 - 12.9 FL    NRBC 0.0 0  WBC    ABSOLUTE NRBC 0.00 0.00 - 0.01 K/uL    NEUTROPHILS 86 (H) 32 - 75 %    LYMPHOCYTES 7 (L) 12 - 49 %    MONOCYTES 5 5 - 13 %    EOSINOPHILS 1 0 - 7 %    BASOPHILS 0 0 - 1 %    IMMATURE GRANULOCYTES 1 (H) 0.0 - 0.5 %    ABS. NEUTROPHILS 13.5 (H) 1.8 - 8.0 K/UL    ABS. LYMPHOCYTES 1.1 0.8 - 3.5 K/UL    ABS. MONOCYTES 0.8 0.0 - 1.0 K/UL    ABS. EOSINOPHILS 0.2 0.0 - 0.4 K/UL    ABS. BASOPHILS 0.0 0.0 - 0.1 K/UL    ABS. IMM.  GRANS. 0.2 (H) 0.00 - 0.04 K/UL    DF SMEAR SCANNED      RBC COMMENTS ANISOCYTOSIS  1+       URINALYSIS W/ REFLEX CULTURE    Collection Time: 03/29/21  4:07 PM    Specimen: Urine   Result Value Ref Range    Color YELLOW/STRAW      Appearance CLOUDY (A) CLEAR      Specific gravity 1.019 1.003 - 1.030      pH (UA) 5.5 5.0 - 8.0      Protein TRACE (A) NEG mg/dL    Glucose Negative NEG mg/dL    Ketone Negative NEG mg/dL    Bilirubin Negative NEG      Blood Negative NEG      Urobilinogen 0.2 0.2 - 1.0 EU/dL    Nitrites Negative NEG      Leukocyte Esterase SMALL (A) NEG      WBC 0-4 0 - 4 /hpf    RBC 0-5 0 - 5 /hpf    Epithelial cells FEW FEW /lpf    Bacteria Negative NEG /hpf    UA:UC IF INDICATED CULTURE NOT INDICATED BY UA RESULT CNI      Mucus TRACE (A) NEG /lpf    Amorphous Crystals 1+ (A) NEG   LACTIC ACID    Collection Time: 03/29/21  7:54 PM   Result Value Ref Range    Lactic acid 1.1 0.4 - 2.0 MMOL/L       Radiologic Studies -   CT PELV W CONT   Final Result   Sacral decubitus ulceration with underlying osteomyelitis of S5 segment. XR CHEST PORT   Final Result   Trace left pleural effusion. Otherwise, no acute findings. CT Results  (Last 48 hours)               03/29/21 2012  CT PELV W CONT Final result    Impression:  Sacral decubitus ulceration with underlying osteomyelitis of S5 segment. Narrative:  EXAM: CT PELV W CONT       INDICATION: worsening sacral wound concern for infection       COMPARISON: None. CONTRAST: 100 mL of Isovue-370. TECHNIQUE:    Multislice helical CT of the pelvis was performed from the iliac crest to the   symphysis pubis during uneventful rapid bolus intravenous contrast   administration. Oral contrast was not administered. Coronal and sagittal   reformations were generated. CT dose reduction was achieved through use of a   standardized protocol tailored for this examination and automatic exposure   control for dose modulation. FINDINGS:   The bones are severely osteopenic.  There is sacral decubitus ulceration near the   midline with abnormal soft tissue opacification extending to bone. There is   posterior osseous erosion demonstrated in the S5 segment consistent with   osteomyelitis. No localized fluid collection is shown. Diffuse subcutaneous   edema is present extending into the thighs which is nonspecific, possibly on the   basis of anasarca. There is massive urinary bladder distention with craniocaudal   dimension of 18 cm in transverse dimensions of 13 x 14 cm. Left femoral bipolar   hemiarthroplasty is shown. CXR Results  (Last 48 hours)               03/29/21 1511  XR CHEST PORT Final result    Impression:  Trace left pleural effusion. Otherwise, no acute findings. Narrative:  EXAM: XR CHEST PORT       INDICATION: cough       COMPARISON: 2/15/2021       FINDINGS: A portable AP radiograph of the chest was obtained at 1503 hours. There is no pneumothorax. Trace left pleural effusion is shown. The lungs are   clear. Cardiac, mediastinal and hilar contours are stable. The bones are   moderately osteopenic. Medical Decision Making   I am the first provider for this patient. I reviewed the vital signs, available nursing notes, past medical history, past surgical history, family history and social history. Vital Signs-Reviewed the patient's vital signs. Patient Vitals for the past 12 hrs:   Temp Pulse Resp BP SpO2   03/29/21 2125 97.3 °F (36.3 °C) 76 20 130/60 100 %   03/29/21 1915 98.2 °F (36.8 °C) 83 22 118/64 100 %   03/29/21 1201 97.7 °F (36.5 °C) 79 16 124/67 100 %     Records Reviewed: Nursing Notes, Old Medical Records, Previous Radiology Studies and Previous Laboratory Studies  I personally reviewed discharge summary records from 2/24/2021. Provider Notes (Medical Decision Making):   82-year-old female here with decubitus ulcer that is enlarging in size in setting of failure to thrive with poor p.o. intake over the past 2 days. She is found to have white count of 15,000 upon arrival here in the ED.   She is afebrile and vital signs are stable. Will evaluate with urinalysis, chest x-ray, blood work to determine if patient requires admission. Patient has no complaints here in the ED but does have dementia and cannot provide further history. Will cover with Vancomycin and Zosyn for sacral wound infection. ED Course:   Initial assessment performed. The patients presenting problems have been discussed, and they are in agreement with the care plan formulated and outlined with them. I have encouraged them to ask questions as they arise throughout their visit. Admission Note:  Patient is being admitted to the hospital by Dr. Andie Landin, Service: Hospitalist.  The results of their tests and reasons for their admission have been discussed with them and available family. They convey agreement and understanding for the need to be admitted and for their admission diagnosis. Disposition:  Admit       Diagnosis     Clinical Impression:   1. Wound infection    2. Failure to thrive in adult        Attestations:  I am the first and primary provider of record for this patient's ED encounter. I personally performed the services described above in this documentation. Kishore Winter MD    Please note that this dictation was completed with [employer], the [employer] voice recognition software. Quite often unanticipated grammatical, syntax, homophones, and other interpretive errors are inadvertently transcribed by the computer software. Please disregard these errors. Please excuse any errors that have escaped final proofreading. Thank you.

## 2021-03-29 NOTE — TELEPHONE ENCOUNTER
Spoke to [employer] w/ [facility]. Gill states that pts wound grew 7cm in 10 days; Full of eschar that would likely need surgical debridement. Gill informed per Dr. Martinez Davies since wound is worsening as well as labs, pt should go to ED. Gill states she will notify pts daughter of Dr. Giuliana Burns recommendations.

## 2021-03-29 NOTE — ED NOTES
Assumed care of pt via EMS stretcher. EMS reports pt was referred by her PCP to come to the ED due to abnormal lab result taken a week ago. EMS is unaware of which lab result is abnormal. EMS report pt has an hx of dementia. Pt is place on the monitor x 3, VSS at this time.      1430 Pt sleeping in POC in stretcher call bell within reach, Pt daughter at bedside     1457 [place] at bedside evaluating pt     1600  and this RN at bedside examining Pt wound on sacrum     1754 Per  pt can eat ordered a soft diet tray for pt     1810 this RN attempted to feed pt, Pt did not want to eat     1854 Multiple attempts to obtain lab, with tech help, Pt is difficult stick will seek tech to place US line

## 2021-03-30 NOTE — WOUND CARE
Wound care consult noted.  nurse reaching out to Dr Cameron Loya to see wounds with him today. Patient was here in 2/2021 with multiple POA PI's.   Irwin Montero RN, [employer]

## 2021-03-30 NOTE — ED NOTES
TRANSFER - OUT REPORT:    Verbal report given to NIHARIKA Irene(name) on [place]  being transferred to [facility] Surg Room 2117(unit) for routine progression of care       Report consisted of patients Situation, Background, Assessment and   Recommendations(SBAR). Information from the following report(s) ED Summary was reviewed with the receiving nurse. Lines:   Peripheral IV 03/29/21 Left Antecubital (Active)   Site Assessment Clean, dry, & intact 03/29/21 1856   Phlebitis Assessment 0 03/29/21 1856   Infiltration Assessment 0 03/29/21 1856   Dressing Status Clean, dry, & intact 03/29/21 1856   Dressing Type Tape;Transparent 03/29/21 1856   Hub Color/Line Status Pink 03/29/21 1856       Peripheral IV 74/37/09 Right Basilic (Active)   Site Assessment Clean, dry, & intact 03/29/21 1955   Phlebitis Assessment 0 03/29/21 1955   Infiltration Assessment 0 03/29/21 1955   Dressing Status Clean, dry, & intact 03/29/21 1955   Dressing Type Tape;Transparent 03/29/21 1955   Hub Color/Line Status Green;Flushed;Patent 03/29/21 1955   Action Taken Blood drawn 03/29/21 1955        Opportunity for questions and clarification was provided.       Patient transported with:   [employer]

## 2021-03-30 NOTE — PROGRESS NOTES
Beside shift change report given to Mimi Hinson (oncoming nurse) from [facility] (offgoing nurses). Shift worked [id number]    Summary: Pts vital signs stable throughout shift. Pt's dressings changes and heels floated throughout shift. Rested throughout shift in bed. Daughter present during most of shift.

## 2021-03-30 NOTE — PROGRESS NOTES
End of Shift Note    Bedside shift change report given to Penelope Kurtz (oncoming nurse) by Osmany Alvarez (offgoing nurse). Report included the following information SBAR    Shift worked:  [id number]     Shift summary and any significant changes:     Pt arrived on unit around 2130. Vitals stable throughout shift. Pt did not have any verbal or visual ques of pain. Pt dressings changes upon assessment of skin. Pt turned throughout night and heels floated to prevent pressure. Pt tolerating ABX. Pt rested quietly throughout night. Pt had to have labs drawn through arterial stick due to poor venous access. Concerns for physician to address:  Pt has poor venous access for blood draws and pulls at IV sites. Pt may be candidate for PICC line     Zone phone for oncoming shift:   [phone]       Activity:  Activity Level: Up with Assistance  Number times ambulated in hallways past shift: 0  Number of times OOB to chair past shift: 0    Cardiac:   Cardiac Monitoring: No           Access:   Current line(s): PIV     Genitourinary:   Urinary status: external catheter    Respiratory:   O2 Device: Room air  Chronic home O2 use?: NO  Incentive spirometer at bedside: N/A     GI:  Last Bowel Movement Date: (OBED)  Current diet:  No diet orders on file  Passing flatus: YES  Tolerating current diet: YES       Pain Management:   Patient states pain is manageable on current regimen: YES    Skin:  Jake Score: 15  Interventions: turn team and foam dressing    Patient Safety:  Fall Score:  Total Score: 2  Interventions: bed/chair alarm, assistive device (walker, cane, etc), gripper socks and pt to call before getting OOB       Length of Stay:  Expected LOS: - - -  Actual LOS: [address]

## 2021-03-30 NOTE — PROGRESS NOTES
Transition of Care Plan:    RUR:18%  Disposition:Home w/[facility]  Follow up appointments:  DME needed:none-has DME needed  Transportation at [email or web address] 3/31  [address] or means to access home: yes       IM Medicare letter:  Caregiver Contact:-Josh Estrada [phone]  Discharge Caregiver contacted prior to discharge? Referral sent to [facility]. CM will continue to follow.     Melvyn Sandifer  Ext [phone]

## 2021-03-30 NOTE — PROGRESS NOTES
Pharmacy  Enoxaparin (Lovenox®) Monitoring      Indication: DVT proph     Current Dose: Enoxaparin 40 mg subcutaneously every 24 hours    Creatinine Clearance (mL/min): 44 ml/min    Current Weight: 59.7 Kg    Labs:  Recent Labs     03/29/21  1246   CREA 0.90   HGB 8.3*   *     Wt Readings from Last 1 Encounters:   03/29/21 59.7 kg (131 lb 9.8 oz)     Ht Readings from Last 1 Encounters:   03/29/21 167.6 cm (66\")       COVID-19 related labs (anticoagulation):   No results for input(s): DDIMSQ, FIBRN in the last 7224 hours. Impression/Plan:   Wt < 60 kg, change proph to heparin sq q12h       Thanks,  River Ashley [facility]      [email or web address]/Pharmacy/Clinical%20Companion/Lovenox%20Dose%[email or web address]. pdf

## 2021-03-30 NOTE — WOUND CARE
Wound care nurse consult: consult for multiple POA PI's. Patient seen with Dr Claudine Olguin who debrided sacral unstageable PI. Patient is a demented 83 y/o AAF admitted for worsening PI's and osteomyelitis of S5 segment. Past Medical History:   Diagnosis Date    Anemia     CHF (congestive heart failure) ([facility] [address] 4/15/2011    CRI (chronic renal insufficiency)     Dementia ([facility])     Diabetes ([facility])     Diastolic CHF, acute ([facility] [address] 3/19/2011    Hypertension     Leukocytosis     Pleural effusion     SVT [facility]supraventricular tachycardia) ([facility])     UTI (urinary tract infection)     V-tach ([facility])     Yeast dermatitis      No past surgical history on file. Patient has staples to left hip from a hip fracture repair back in 2/2021. Staples ready to come out.     Sacrum before and after debridement:no pus or blatant areas of infected tissue found        Right lateral calf:  Left lateral calf      Right heel    Left heel      WOUND POA CONDITIONS    Wound Sacrum 02/15/21 (Active)   Wound Image   03/30/21 0953   Wound Etiology Pressure Unstageable 03/30/21 0953   Dressing Status New dressing applied 03/30/21 0953   Cleansed Cleansed with saline 03/30/21 0953   Dressing/Treatment Moist to dry 03/30/21 0953   Wound Length (cm) 10 cm 03/30/21 0953   Wound Width (cm) 18 cm 03/30/21 0953   Wound Depth (cm) 0.5 cm 03/30/21 0953   Wound Surface Area (cm^2) 180 cm^2 03/30/21 0953   Change in Wound Size % -32.35 03/30/21 0953   Wound Volume (cm^3) 90 cm^3 03/30/21 0953   Wound Assessment Eschar moist;Slough;Pink/red 03/30/21 0953   Drainage Amount Moderate 03/30/21 0953   Drainage Description Serosanguinous 03/30/21 0953   Wound Odor Mild 03/30/21 0953   Annita-Wound/Incision Assessment Denuded 03/30/21 0953   Edges Epibole (rolled edges) 03/30/21 0953   Wound Thickness Description Full thickness 03/30/21 0953   Number of days: 42       Wound Heel Right 02/15/21 (Active)   Wound Image   03/30/21 0953   Wound Etiology Pressure Unstageable 03/30/21 0953   Wound Length (cm) 4 cm 03/30/21 0953   Wound Width (cm) 4 cm 03/30/21 0953   Wound Surface Area (cm^2) 16 cm^2 03/30/21 0953   Change in Wound Size % 36 03/30/21 0953   Wound Assessment Eschar moist;Slough;Pink/red 03/30/21 0953   Drainage Amount Scant 03/30/21 0953   Drainage Description Serosanguinous 03/30/21 0953   Wound Odor None 03/30/21 0953   Annita-Wound/Incision Assessment Intact 03/30/21 0953   Edges Epibole (rolled edges) 03/30/21 0953   Number of days: 42       Wound Heel Left 03/30/21 (Active)   Wound Image   03/30/21 0953   Wound Etiology Deep Tissue/Injury 03/30/21 0953   Dressing/Treatment Open to air 03/30/21 0953   Wound Length (cm) 1 cm 03/30/21 0953   Wound Width (cm) 1 cm 03/30/21 0953   Wound Surface Area (cm^2) 1 cm^2 03/30/21 0953   Wound Assessment Purple/maroon 03/30/21 0953   Drainage Amount None 03/30/21 0953   Wound Odor None 03/30/21 0953   Annita-Wound/Incision Assessment Intact 03/30/21 0953   Edges Flat/open edges 03/30/21 0953   Number of days: 0       Wound Calf Left 03/30/21 (Active)   Wound Image   03/30/21 0953   Wound Etiology Pressure Unstageable 03/30/21 0953   Wound Length (cm) 14 cm 03/30/21 0953   Wound Width (cm) 7 cm 03/30/21 0953   Wound Surface Area (cm^2) 98 cm^2 03/30/21 0953   Wound Assessment Eschar dry 03/30/21 0953   Drainage Amount None 03/30/21 0953   Wound Odor None 03/30/21 0953   Annita-Wound/Incision Assessment Intact 03/30/21 0953   Edges Epibole (rolled edges) 03/30/21 0953   Wound Thickness Description Full thickness 03/30/21 0953   Number of days: 0       Wound Calf Right 03/30/21 (Active)   Wound Image   03/30/21 0953   Wound Etiology Pressure Unstageable 03/30/21 0953   Wound Length (cm) 12 cm 03/30/21 0953   Wound Width (cm) 3 cm 03/30/21 0953   Wound Surface Area (cm^2) 36 cm^2 03/30/21 0953   Wound Assessment Eschar dry 03/30/21 0953   Drainage Amount None 03/30/21 0953   Wound Odor None 03/30/21 0953   Annita-Wound/Incision Assessment Intact 03/30/21 0953   Edges Epibole (rolled edges) 03/30/21 0953   Wound Thickness Description Full thickness 03/30/21 0953   Number of days: 0           Recommend verbal orders per Dr Bjorn Emerson    1. Marks catheter to keep urinary incontinence out of wound  2. Remove staples from left hip  3. Sacral and BLE wounds: daily, cleanse by wiping clean with NS moist 4x4. s. Apply Santyl to all wounds and pack/cover wounds with NS moist Kerlix gauze only. Cover sacrum with dry 4x4's and ABD pads. Cover leg wounds with 6x6 foam dressings. 4. Specialty bed ordered- Envision on a [facility] bed frame  5. Right heel paint with betadine/povidine swabs, allow to air dry and may cover with a dry dressing (no foam) until dried up. 6. Float heels    Plan: Consults placed to Palliative and Hospice by MD. Patient will be followed by [facility] nurse weekly while inpatient to monitor wounds and patient over all condition.   Benny Osgood, RN, [employer]

## 2021-03-30 NOTE — PROGRESS NOTES
Pharmacy Automatic Renal Dosing Protocol - Antimicrobials    Indication for Antimicrobials: SSTI     Current Regimen of Each Antimicrobial:  Vancomycin 1000 mg IV once followed by 750 mg IV Q16H (Start Date 3/29, Day 2)  Cefepime 1 g IV Q12H (Start Date 3/30, Day 1)    Previous Antimicrobial Therapy:  Vancomycin x1  Zosyn 3.375 g IV Q8H (Start Date 3/30; Day # 1)    Significant Cultures: PBCx - GPC in clusters    Radiology / Imaging results: (X-ray, CT scan or MRI): NA    Paralysis, amputations, malnutrition: NA    Labs:  Recent Labs     21  0602 21  1246   CREA 0.88 0.90   BUN 23* 21*   WBC 19.1* 15.8*     Temp (24hrs), Av.4 °F (36.3 °C), Min:96.7 °F (35.9 °C), Max:98.2 °F (36.8 °C)    Is the Patient on Dialysis? No    Creatinine Clearance (mL/min):   CrCl (Actual Body Weight): 45.7  CrCl (Adjusted Body Weight): 45.5  CrCl (Ideal Body Weight): 45.3    Impression/Plan:   Patient received 1000 mg yesterday, have ordered 750 mg IV Q16H for an expected trough of 15  Cefepime dose okay  **Consult for SSTI, GPCs in bloodstream, do they need 14 days? Will discuss on rounds. Antimicrobial stop date 7 days     Pharmacy will follow daily and adjust medications as appropriate for renal function and/or serum levels. Thank you,  Kenji Snowden, IRAD    Recommended duration of therapy  http://[facility]/Pharmacy/Clinical%20Companion/Duration%20of%20ABX%20therapy. docx    Renal Dosing  [email or web address]/[place]/[facility]/Pharmacy/Clinical%20Companion/Renal%[email or web address]. pdf

## 2021-03-30 NOTE — PROGRESS NOTES
Assessed pt for Midline. Currently has 2 working IV's . Primary nurse needs assistance with lab draws. Will offer assistance until her plan of care is confirmed. Contacted Dr Jesi Cervantes with plan.

## 2021-03-30 NOTE — PROGRESS NOTES
1630 received call from [address] [place] [address] lab. Paired blood cultures drawn 3/29, all 4 growing gram + cocci, Right Basilic  And right arm. Dr Simone Nunez notified.

## 2021-03-30 NOTE — PROGRESS NOTES
Nutrition Note    Chart reviewed; RD received an automatic nutrition referral for unstageable PI. Noted family has opted for hospice and pt will be discharged home tomorrow. No plans for aggressive nutrition intervention. Goal would be to maintain the comfort of the pt with po as tolerable. RD will sign-off.     Electronically signed by Jesus Rich RD on 3/30/2021 at 3:51 PM

## 2021-03-30 NOTE — HOSPICE
[facility]  [phone]    Patient Name: Anuj Day  YOB: 1935  Age: 82 y.o. [address] [facility] RN Note:  Hospice consult noted. Chart reviewed. Plan of care discussed with patients nurse & care manager. In to meet with  and daughter. Discussed Hospice philosophy, general plan of care, levels of care, services and on call procedures. Family information packet provided & reviewed with them. Family has accepted our services and would like to take her home tomorrow. Equipment is in the home. She will need transport. Requesting am transport. Thank you for the opportunity to be of service to this patient.     Serena Doan RN  [phone]

## 2021-03-30 NOTE — CONSULTS
Palliative Medicine      Hospice and Palliative Services were ordered; family has signed consents for Hospice and are taking pt home with Hospice tomorrow. Will discontinue Palliative Medicine order.

## 2021-03-30 NOTE — PROGRESS NOTES
TRANSFER - IN REPORT:    Verbal report received from Kaykay(name) on Carla Palacios  being received from ED(unit) for routine progression of care      Report consisted of patients Situation, Background, Assessment and   Recommendations(SBAR). Information from the following report(s) SBAR was reviewed with the receiving nurse. Opportunity for questions and clarification was provided. Assessment completed upon patients arrival to unit and care assumed.

## 2021-03-30 NOTE — PROGRESS NOTES
Spiritual Care Assessment/Progress Note  [facility]      NAME: Tony Holder      MRN: [id number]  AGE: 82 y.o. SEX: female  Latter-day Affiliation: Sabianism   Language: English     3/30/2021     Total Time (in minutes): 5     Spiritual Assessment begun in [facility] GENERAL SURGERY through conversation with:         []Patient        [] Family    [] Friend(s)        Reason for Consult: Palliative Care, Initial/Spiritual Assessment     Spiritual beliefs: (Please include comment if needed)     [] Identifies with a navi tradition:         [] Supported by a navi community:            [] Claims no spiritual orientation:           [] Seeking spiritual identity:                [] Adheres to an individual form of spirituality:           [x] Not able to assess:                           Identified resources for coping:      [] Prayer                               [] Music                  [] Guided Imagery     [] Family/friends                 [] Pet visits     [] Devotional reading                         [x] Unknown     [] Other:                                               Interventions offered during this visit: (See comments for more details)    Patient Interventions: Initial visit           Plan of Care:     [] Support spiritual and/or cultural needs    [] Support AMD and/or advance care planning process      [] Support grieving process   [] Coordinate Rites and/or Rituals    [] Coordination with community clergy   [] No spiritual needs identified at this time   [] Detailed Plan of Care below (See Comments)  [] Make referral to Music Therapy  [] Make referral to Pet Therapy     [] Make referral to Addiction services  [] Make referral to [facility]  [] Make referral to Spiritual Care Partner  [] No future visits requested        [x] Follow up upon further referrals     Comments:     Attempted to provide initial Palliative Care assessment for Ms. Haresh Love in 2117.  Performed chart review before attempt. Upon arrival, patient was in consult with other medical staff. Unable to assess. Will make another attempt as able.        [id number] [facility] Pattie Juares., M.S., Th.M.  Spiritual Care Provider   Paging Service [phone])

## 2021-03-30 NOTE — CONSULTS
Consult Date: 3/30/2021    IP CONSULT TO GENERAL SURGERY  Consult performed by: Naren Edwards MD  Consult ordered by: Gloria Burch MD  Reason for consult: Stage IV sacral pressure ulcer  Assessment/Recommendations: Reviewed findings. Recommend removing staples from left hip    Debridement performed, see note below    Wound care instructions left    We will reassess later this week                  Subjective    Patient aphasic  History obtained from chart    Independently reviewed and interpreted CT images    FINDINGS:  The bones are severely osteopenic. There is sacral decubitus ulceration near the  midline with abnormal soft tissue opacification extending to bone. There is  posterior osseous erosion demonstrated in the S5 segment consistent with  osteomyelitis. No localized fluid collection is shown. Diffuse subcutaneous  edema is present extending into the thighs which is nonspecific, possibly on the  basis of anasarca. There is massive urinary bladder distention with craniocaudal  dimension of 18 cm in transverse dimensions of 13 x 14 cm. Left femoral bipolar  hemiarthroplasty is shown.     IMPRESSION  Sacral decubitus ulceration with underlying osteomyelitis of S5 segment. Left hip replacement 1 month ago        Past Medical History:   Diagnosis Date    Anemia     CHF [facility]congestive heart failure) ([facility] 4/15/2011    CRI (chronic renal insufficiency)     Dementia ([facility])     Diabetes ([facility])     Diastolic CHF, acute ([facility] [address] 3/19/2011    Hypertension     Leukocytosis     Pleural effusion     SVT (supraventricular tachycardia) ([facility])     UTI (urinary tract infection)     V-tach ([facility])     Yeast dermatitis       No past surgical history on file.   Family History   Problem Relation Age of Onset    Heart Disease Father     Hypertension Sister     Diabetes Sister     Asthma Sister     Diabetes Brother     Hypertension Brother     Hypertension Sister     Heart Disease Sister     Diabetes Brother     Hypertension Brother       Social History     Tobacco Use    Smoking status: Never Smoker    Smokeless tobacco: Never Used   Substance Use Topics    Alcohol use: Not Currently       Current Facility-Administered Medications   Medication Dose Route Frequency Provider Last Rate Last Admin    dextrose (D50W) 2.5 %, sodium chloride 4 MEQ/ML (23.4%) 0.225 % in sterile water 1,000 mL infusion   IntraVENous CONTINUOUS Claudette Guerrero MD 50 mL/hr at 03/30/21 1015 New Bag at 03/30/21 1015    potassium chloride 10 mEq in 100 ml IVPB  10 mEq IntraVENous Q1H Claudette Guerrero  mL/hr at 03/30/21 1015 10 mEq at 28/85/39 4697    folic acid (FOLVITE) tablet 1 mg  1 mg Oral DAILY Claudette Guerrero MD   1 mg at 03/30/21 1016    thiamine mononitrate (B-1) tablet 200 mg  200 mg Oral DAILY Claudette Guerrero MD        collagenase (SANTYL) 250 unit/gram ointment   Topical DAILY Chayulisa Gonzales MD        aspirin delayed-release tablet 81 mg  81 mg Oral DAILY Claudette Guerrero MD   81 mg at 03/30/21 1016    sodium chloride (NS) flush 5-40 mL  5-40 mL IntraVENous Q8H Claudette Guerrero MD   10 mL at 03/30/21 8390    sodium chloride (NS) flush 5-40 mL  5-40 mL IntraVENous PRN Claudette Guerrero MD        acetaminophen (TYLENOL) tablet 650 mg  650 mg Oral Q6H PRN Claudette Guerrero MD        Or   [employer] acetaminophen (TYLENOL) suppository 650 mg  650 mg Rectal Q6H PRN Claudette Guerrero MD        polyethylene glycol (MIRALAX) packet 17 g  17 g Oral DAILY PRN Claudette Guerrero MD        carvediloL (COREG) tablet 3.125 mg  3.125 mg Oral BID WITH MEALS Claudette Guerrero MD   3.125 mg at 03/30/21 1016    heparin (porcine) injection 5,000 Units  5,000 Units SubCUTAneous Q12H Claudette Guerrero MD   5,000 Units at 03/30/21 1016    glucose chewable tablet 16 g  4 Tab Oral PRN Marlen Mayberry MD        dextrose (D50W) injection syrg 12.5-25 g  25-50 mL IntraVENous PRN Jozef Guerrero MD        glucagon [facility]) injection 1 mg  1 mg IntraMUSCular PRN Jozef Geurrero MD        insulin lispro (HUMALOG) injection   SubCUTAneous AC&HS Jozef Guerrero MD            Review of Systems   Unable to perform ROS: Mental status change       Objective     Vital signs for last 24 hours:  Visit Vitals  BP 95/67   Pulse 72   Temp 97.3 °F (36.3 °C)   Resp 17   Ht 5' 6\" (1.676 m)   Wt 59.7 kg (131 lb 9.8 oz)   SpO2 100%   BMI 21.24 kg/m²       Intake/Output this shift:  Current Shift: No intake/output data recorded. Last 3 Shifts: 03/28 1901 - 03/30 0700  In: 735.8 [I.V.:735.8]  Out: 300 [Urine:300]    Data Review:   Recent Results (from the past 24 hour(s))   METABOLIC PANEL, COMPREHENSIVE    Collection Time: 03/29/21 12:46 PM   Result Value Ref Range    Sodium 147 (H) 136 - 145 mmol/L    Potassium 3.4 (L) 3.5 - 5.1 mmol/L    Chloride 117 (H) 97 - 108 mmol/L    CO2 24 21 - 32 mmol/L    Anion gap 6 5 - 15 mmol/L    Glucose 92 65 - 100 mg/dL    BUN 21 (H) 6 - 20 MG/DL    Creatinine 0.90 0.55 - 1.02 MG/DL    BUN/Creatinine ratio 23 (H) 12 - 20      GFR est AA >60 >60 ml/min/1.73m2    GFR est non-AA 60 (L) >60 ml/min/1.73m2    Calcium 8.2 (L) 8.5 - 10.1 MG/DL    Bilirubin, total 0.3 0.2 - 1.0 MG/DL    ALT (SGPT) 9 (L) 12 - 78 U/L    AST (SGOT) 17 15 - 37 U/L    Alk. phosphatase 87 45 - 117 U/L    Protein, total 7.0 6.4 - 8.2 g/dL    Albumin 1.7 (L) 3.5 - 5.0 g/dL    Globulin 5.3 (H) 2.0 - 4.0 g/dL    A-G Ratio 0.3 (L) 1.1 - 2.2     SAMPLES BEING HELD    Collection Time: 03/29/21 12:46 PM   Result Value Ref Range    SAMPLES BEING HELD BLUE     COMMENT        Add-on orders for these samples will be processed based on acceptable specimen integrity and analyte stability, which may vary by analyte.    CBC WITH AUTOMATED DIFF    Collection Time: 03/29/21 12:46 PM   Result Value Ref Range    WBC 15.8 (H) 3.6 - 11.0 K/uL    RBC 2.86 (L) 3.80 - 5.20 M/uL    HGB 8.3 (L) 11.5 - 16.0 g/dL HCT 26.9 (L) 35.0 - 47.0 %    MCV 94.1 80.0 - 99.0 FL    MCH 29.0 26.0 - 34.0 PG    MCHC 30.9 30.0 - 36.5 g/dL    RDW 16.7 (H) 11.5 - 14.5 %    PLATELET 982 (H) 123 - 400 K/uL    MPV 10.2 8.9 - 12.9 FL    NRBC 0.0 0  WBC    ABSOLUTE NRBC 0.00 0.00 - 0.01 K/uL    NEUTROPHILS 86 (H) 32 - 75 %    LYMPHOCYTES 7 (L) 12 - 49 %    MONOCYTES 5 5 - 13 %    EOSINOPHILS 1 0 - 7 %    BASOPHILS 0 0 - 1 %    IMMATURE GRANULOCYTES 1 (H) 0.0 - 0.5 %    ABS. NEUTROPHILS 13.5 (H) 1.8 - 8.0 K/UL    ABS. LYMPHOCYTES 1.1 0.8 - 3.5 K/UL    ABS. MONOCYTES 0.8 0.0 - 1.0 K/UL    ABS. EOSINOPHILS 0.2 0.0 - 0.4 K/UL    ABS. BASOPHILS 0.0 0.0 - 0.1 K/UL    ABS. IMM. GRANS. 0.2 (H) 0.00 - 0.04 K/UL    DF SMEAR SCANNED      RBC COMMENTS ANISOCYTOSIS  1+       URINALYSIS W/ REFLEX CULTURE    Collection Time: 03/29/21  4:07 PM    Specimen: Urine   Result Value Ref Range    Color YELLOW/STRAW      Appearance CLOUDY (A) CLEAR      Specific gravity 1.019 1.003 - 1.030      pH (UA) 5.5 5.0 - 8.0      Protein TRACE (A) NEG mg/dL    Glucose Negative NEG mg/dL    Ketone Negative NEG mg/dL    Bilirubin Negative NEG      Blood Negative NEG      Urobilinogen 0.2 0.2 - 1.0 EU/dL    Nitrites Negative NEG      Leukocyte Esterase SMALL (A) NEG      WBC 0-4 0 - 4 /hpf    RBC 0-5 0 - 5 /hpf    Epithelial cells FEW FEW /lpf    Bacteria Negative NEG /hpf    UA:UC IF INDICATED CULTURE NOT INDICATED BY UA RESULT CNI      Mucus TRACE (A) NEG /lpf    Amorphous Crystals 1+ (A) NEG   CULTURE, BLOOD, PAIRED    Collection Time: 03/29/21  7:54 PM    Specimen: Blood   Result Value Ref Range    Special Requests: NO SPECIAL REQUESTS      Culture result:        FOUR OF FOUR BOTTLES HAVE BEEN FLAGGED POSITIVE BY INSTRUMENT. BOTTLES HAVE BEEN SENT TO [address] [place] [address] LABORATORY TO ASSESS FOR POSSIBLE GROWTH.    LACTIC ACID    Collection Time: 03/29/21  7:54 PM   Result Value Ref Range    Lactic acid 1.1 0.4 - 2.0 MMOL/L   METABOLIC PANEL, BASIC    Collection Time: 03/30/21  6:02 AM Result Value Ref Range    Sodium 150 (H) 136 - 145 mmol/L    Potassium 3.0 (L) 3.5 - 5.1 mmol/L    Chloride 118 (H) 97 - 108 mmol/L    CO2 20 (L) 21 - 32 mmol/L    Anion gap 12 5 - 15 mmol/L    Glucose 104 (H) 65 - 100 mg/dL    BUN 23 (H) 6 - 20 MG/DL    Creatinine 0.88 0.55 - 1.02 MG/DL    BUN/Creatinine ratio 26 (H) 12 - 20      GFR est AA >60 >60 ml/min/1.73m2    GFR est non-AA >60 >60 ml/min/1.73m2    Calcium 7.9 (L) 8.5 - 10.1 MG/DL   MAGNESIUM    Collection Time: 03/30/21  6:02 AM   Result Value Ref Range    Magnesium 2.0 1.6 - 2.4 mg/dL   CBC WITH AUTOMATED DIFF    Collection Time: 03/30/21  6:02 AM   Result Value Ref Range    WBC 19.1 (H) 3.6 - 11.0 K/uL    RBC 2.27 (L) 3.80 - 5.20 M/uL    HGB 6.6 (L) 11.5 - 16.0 g/dL    HCT 21.2 (L) 35.0 - 47.0 %    MCV 93.4 80.0 - 99.0 FL    MCH 29.1 26.0 - 34.0 PG    MCHC 31.1 30.0 - 36.5 g/dL    RDW 16.6 (H) 11.5 - 14.5 %    PLATELET 997 (H) 843 - 400 K/uL    MPV 9.9 8.9 - 12.9 FL    NRBC 0.0 0  WBC    ABSOLUTE NRBC 0.00 0.00 - 0.01 K/uL    NEUTROPHILS 91 (H) 32 - 75 %    LYMPHOCYTES 5 (L) 12 - 49 %    MONOCYTES 3 (L) 5 - 13 %    EOSINOPHILS 0 0 - 7 %    BASOPHILS 0 0 - 1 %    IMMATURE GRANULOCYTES 1 (H) 0.0 - 0.5 %    ABS. NEUTROPHILS 17.3 (H) 1.8 - 8.0 K/UL    ABS. LYMPHOCYTES 1.0 0.8 - 3.5 K/UL    ABS. MONOCYTES 0.6 0.0 - 1.0 K/UL    ABS. EOSINOPHILS 0.0 0.0 - 0.4 K/UL    ABS. BASOPHILS 0.0 0.0 - 0.1 K/UL    ABS. IMM. GRANS. 0.2 (H) 0.00 - 0.04 K/UL    DF SMEAR SCANNED      RBC COMMENTS ANISOCYTOSIS  1+       GLUCOSE, POC    Collection Time: 03/30/21  8:59 AM   Result Value Ref Range    Glucose (POC) 111 (H) 65 - 100 mg/dL    Performed by Cholo Ku        Physical Exam  Constitutional:       General: She is not in acute distress. Appearance: She is well-developed. She is not diaphoretic. HENT:      Head: Normocephalic and atraumatic. Mouth/Throat:      Pharynx: No oropharyngeal exudate.    Eyes:      Pupils: Pupils are equal, round, and reactive to light. Neck:      Musculoskeletal: Normal range of motion. Trachea: No tracheal deviation. Cardiovascular:      Rate and Rhythm: Normal rate and regular rhythm. Heart sounds: Normal heart sounds. No murmur. Pulmonary:      Effort: Pulmonary effort is normal. No respiratory distress. Breath sounds: Normal breath sounds. No wheezing. Abdominal:      General: Bowel sounds are normal. There is no distension. Palpations: Abdomen is soft. There is no mass. Tenderness: There is no abdominal tenderness. There is no guarding or rebound. Musculoskeletal: Normal range of motion. General: No tenderness. Lymphadenopathy:      Cervical: No cervical adenopathy. Skin:     General: Skin is warm. Findings: No erythema or rash. Comments: Multiple pressure wounds on bilateral lower extremities upper back lower back sacrum    See full description in wound care nurse note   Neurological:      Mental Status: She is oriented to person, place, and time. Psychiatric:         Speech: She is noncommunicative. Behavior: Behavior is cooperative. Procedure:  Sharp excisional debridement skin and soft tissue sacral pressure ulcer 10 cm x 15 cm    Tool used: #10 scalpel  Frequency: This is the first debridement on this admission. May need further serial debridement depending on further assessment. Measurement of wound prior to debridement: 10 cm x 18 cm    Measurement of wound post debridement: 10 cm x 18 cm x 0.5 cm    Area and depth and type of tissue removed: 180cm2, skin. soft tissue,and fascia  Blood loss: 30 ml    Indication: There is thick eschar overlying the wound and significant fibrinous material preventing wound healing and likely harboring infection. Procedure Details: The risks, benefits, and alternatives were explained and consent was obtained. The area was prepped and draped in the usual manor.  A #10 blade was used to perform sharp excisional debridement of the affected skin and soft tissue. This was done down to some bleeding tissues. Pt has minimal soft tissue. Center of wound is down to bone. Once completed, the patient was turned over the wound care team to complete the dressing change. The patient tolerated the procedure well. Wound care instructions were left. Wound will be reassessed later this week.         pre       post      [id number]  [id number] [phone]

## 2021-03-30 NOTE — HOSPICE
[facility]  Note:    Consents Reviewed: Yes  Person Reviewed/Signed with: Fausto Calero ()  Right to NCD Reviewed: Yes  NCD Requested: No  Admission Nurse/Intake Notified: Yes   Planned Start of Care Date: 3/31/2021  Hospice Witness Representative: Candi COLINDRES reviewed and signed consents with pt's  Joselo Rojo. Pt's daughter Radu Goodwin was also present.     BERNADINE Yun  [facility]   [phone]

## 2021-03-30 NOTE — DIABETES MGMT
Consult acknowledged for diabetes management regarding medication recommendations. However noted that patient is being seen by Palliative care and also for a hospice evaluation. Will sign off at this time and allow for palliative medicine and hospice to further manage medication regime.      Simin BANG, RN, ACCNS-AG   Diabetes Clinical Nurse Specialist   [facility]  Access via [email or web address]

## 2021-03-30 NOTE — PROGRESS NOTES
.      Hospitalist Progress Note    NAME: Reuben Buckley   :  1935   MRN:  [id number]       Assessment / Plan:    Worsening decubitus ulcers  Osteomyelitis of S5 segment  Leukocytosis     Her previous evaluation was reviewed from the chart and she was noted at that time to have present on admission the following:  Sacral unstageable pressure injury   Mid upper back stage III pressure   Right heel unstageable pressure injury   Inner upper thighs incontinence MASD with partial-thickness wounds      CT of the abdomen and pelvis was reported posterior osseous erosion demonstrated in the S5 segment consistent with osteomyelitis.       3/30 Blood cultures growing GPCs     given one dose of vancomycin in ED and continued on piperacillin tazobactam  3/30 continuing vancomycin and switching to cefepime   Wound care consulted  General surgery consulted and bedside debridement done  [facility] care consulted will be going home tomorrow with hospice     Urinary retention  Evident on CT scan  Straight catheter as needed and regular bladder scanning     Poor oral intake  Dehydration  Hypernatremia  continue gentle IV hydration  Encourage oral intake/dysphagia diet     Nonischemic dilated cardiomyopathy  Most recent echo with ejection fraction of 30 to 35%  Continue carvedilol at 3.125 mg twice daily (this is less than the usual dose but could be increased gradually later on as patient has soft blood pressure right now)     Dementia  Bedbound  Recent left hip fracture post-ORIF     Type 2 diabetes  We will monitor blood sugars and will add corrective sliding scale     3/29 Case discussed in detail with her  who is in agreement with the plan and was open to my suggestions for palliative care and hospice care evaluation and discussion. 3/30 met with daughter at bedside. Family agreed to return home with hospice       18.5 - 24.9 Normal weight / Body mass index is 21.24 kg/m².     Code status: DNR  Prophylaxis: Hep SQ  Recommended Disposition: home with hospice     Subjective:     Chief Complaint / Reason for Physician Visit  \"I feel ok\". Discussed with RN events overnight. Review of Systems:  Symptom Y/N Comments  Symptom Y/N Comments   Fever/Chills    Chest Pain     Poor Appetite    Edema     Cough    Abdominal Pain     Sputum    Joint Pain     SOB/GREEN    Pruritis/Rash     Nausea/vomit    Tolerating PT/OT     Diarrhea    Tolerating Diet     Constipation    Other       Could NOT obtain due to: dementia     Objective:     VITALS:   Last 24hrs VS reviewed since prior progress note. Most recent are:  Patient Vitals for the past 24 hrs:   Temp Pulse Resp BP SpO2   03/30/21 1133 97.6 °F (36.4 °C) 68 16 (!) 106/41 100 %   03/30/21 0800 97.3 °F (36.3 °C) 72 17 95/67 100 %   03/30/21 0343 97.2 °F (36.2 °C) 72 18 (!) 122/50 99 %   03/30/21 0252 (!) 96.7 °F (35.9 °C) 71 18 (!) 125/49 100 %   03/29/21 2125 97.3 °F (36.3 °C) 76 20 130/60 100 %   03/29/21 1915 98.2 °F (36.8 °C) 83 22 118/64 100 %       Intake/Output Summary (Last 24 hours) at 3/30/2021 1859  Last data filed at 3/30/2021 7226  Gross per 24 hour   Intake 735.83 ml   Output 300 ml   Net 435.83 ml        I had a face to face encounter and independently examined this patient on 3/30/2021, as outlined below:  PHYSICAL EXAM:  General: WD, WN. Alert, cooperative, no acute distress    EENT:  EOMI. Anicteric sclerae. MMM  Resp:  CTA bilaterally, no wheezing or rales. No accessory muscle use  CV:  Regular  rhythm,  No edema  GI:  Soft, Non distended, Non tender. +Bowel sounds  Neurologic:  Alert and oriented X to person, normal speech,   Psych:   Poor insight. Not anxious nor agitated  Skin:  No rashes.   No jaundice    Reviewed most current lab test results and cultures  YES  Reviewed most current radiology test results   YES  Review and summation of old records today    NO  Reviewed patient's current orders and MAR    YES  PMH/SH reviewed - no change compared to H&P  ________________________________________________________________________  Care Plan discussed with:    Comments   Patient x    Family  x    RN x    Care Manager x    Consultant                       x Multidiciplinary team rounds were held today with , nursing, pharmacist and clinical coordinator. Patient's plan of care was discussed; medications were reviewed and discharge planning was addressed. ________________________________________________________________________  Total NON critical care TIME:  28   Minutes    Total CRITICAL CARE TIME Spent:   Minutes non procedure based      Comments   >50% of visit spent in counseling and coordination of care x    ________________________________________________________________________  Lisa New MD     Procedures: see electronic medical records for all procedures/Xrays and details which were not copied into this note but were reviewed prior to creation of Plan. LABS:  I reviewed today's most current labs and imaging studies.   Pertinent labs include:  Recent Labs     03/30/21  0602 03/29/21  1246   WBC 19.1* 15.8*   HGB 6.6* 8.3*   HCT 21.2* 26.9*   * 591*     Recent Labs     03/30/21  0602 03/29/21  1246   * 147*   K 3.0* 3.4*   * 117*   CO2 20* 24   * 92   BUN 23* 21*   CREA 0.88 0.90   CA 7.9* 8.2*   MG 2.0  --    ALB  --  1.7*   TBILI  --  0.3   ALT  --  9*       Signed: Lisa New MD

## 2021-03-30 NOTE — HOSPICE
[facility]  [phone]     Patient Name: Agustina Zarate  YOB: 1935  Age: 82 y.o. [address] RN Note:  Hospice consult received, reviewing chart. Will follow up with Unit Nurse and Care Manager to discuss plan of care, patient status and discharge disposition within the hour. Meeting with spouse at 17noon in patient room to discuss hospice. Thank you for the opportunity to be of service to this patient.     Blanca Munoz RN  Clinical Nurse Liaison   [address]  [phone] (r) [phone]

## 2021-03-30 NOTE — H&P
.                  Hospitalist Admission Note    NAME: Otilia Song   :  1935   MRN:  [id number]     Date/Time:  3/29/2021 9:19 PM    Patient PCP: Andrew Coronado MD  ______________________________________________________________________  Given the patient's current clinical presentation, I have a high level of concern for decompensation if discharged from the emergency department. Complex decision making was performed, which includes reviewing the patient's available past medical records, laboratory results, and x-ray films. My assessment of this patient's clinical condition and my plan of care is as follows. Assessment / Plan:      Worsening decubitus ulcers  Osteomyelitis of S5 segment  Leukocytosis    Her previous evaluation was reviewed from the chart and she was noted at that time to have present on admission the following:  Sacral unstageable pressure injury   Mid upper back stage III pressure   Right heel unstageable pressure injury   Inner upper thighs incontinence MASD with partial-thickness wounds     CT of the abdomen and pelvis was reported posterior osseous erosion demonstrated in the S5 segment consistent with osteomyelitis.         Continue piperacillin tazobactam  Wound care consultation  General surgery consultation  Palliative care consultation  [facility] care consultation    Urinary retention  Evident on CT scan  Straight catheter as needed and regular bladder scanning    Poor oral intake  Dehydration  Hypernatremia  Start gentle IV hydration  Encourage oral intake/dysphagia diet    Nonischemic dilated cardiomyopathy  Most recent echo with ejection fraction of 30 to 35%  Continue carvedilol at 3.125 mg twice daily (this is less than the usual dose but could be increased gradually later on as patient has soft blood pressure right now)    Dementia  Bedbound  Recent left hip fracture post-ORIF    Type 2 diabetes  We will monitor blood sugars and will add corrective sliding scale    Case discussed in detail with her  who is in agreement with the plan and was open to my suggestions for palliative care and hospice care evaluation and discussion. Code Status: DNR confirmed with , advance care planning: Discussed with   Surrogate Decision Maker:  information on record    DVT Prophylaxis: Enoxaparin  GI Prophylaxis: not indicated    Baseline: Bedbound      Subjective:   CHIEF COMPLAINT: Worsening pressure ulcers    HISTORY OF PRESENT ILLNESS:     Suzanne Albert is a 82 y.o.  female who presents as recommended by her primary care provider who had noted that her white cell count has been increasing and the patient had been having worsening hypernatremia with worsening decubitus ulcers. Patient is not very much able to give history as she is demented, history was taken from medical records and her  over the phone. Patient is known to me from a previous admission after she had a fall in January and had been bedbound since was admitted in February found to have left hip fracture and that she had developed multiple pressure ulcers with significant prerenal azotemia and hyper natremia. UTI complicated with bacteremia. Her evaluation in the ED was significant for worsening leukocytosis, recurrence of prerenal azotemia with hyponatremia, and CT scan suggestive of osteomyelitis of S5. We were asked to admit for work up and evaluation of the above problems.      Past Medical History:   Diagnosis Date    Anemia     CHF (congestive heart failure) ([facility] [address] 4/15/2011    CRI (chronic renal insufficiency)     Dementia ([facility])     Diabetes ([facility])     Diastolic CHF, acute ([facility] [address] 3/19/2011    Hypertension     Leukocytosis     Pleural effusion     SVT (supraventricular tachycardia) ([facility])     UTI (urinary tract infection)     V-tach ([facility])     Yeast dermatitis       Past surgical history:  Left hip fracture ORIF    Social History Tobacco Use    Smoking status: Never Smoker    Smokeless tobacco: Never Used   Substance Use Topics    Alcohol use: Not Currently        Family History   Problem Relation Age of Onset    Heart Disease Father     Hypertension Sister     Diabetes Sister     Asthma Sister     Diabetes Brother     Hypertension Brother     Hypertension Sister     Heart Disease Sister     Diabetes Brother     Hypertension Brother      No Known Allergies     Prior to Admission medications    Medication Sig Start Date End Date Taking? Authorizing Provider   cyanocobalamin 1,000 mcg tablet Take 1 Tab by mouth daily. 3/19/21   Bebeto Saucedo MD   furosemide (LASIX) 40 mg tablet Take 1 Tab by mouth daily. daily 3/19/21   Bebeto Saucedo MD   Accu-Chek Softclix Lancets misc TEST BLOOD SUGAR EVERY DAY 3/15/21   Bebeto Saucedo MD   acetaminophen (TYLENOL) 650 mg TbER Take 650 mg by mouth every eight (8) hours. Provider, Historical   multivitamin (Daily Multiple) tablet Take 1 Tab by mouth daily. Provider, Historical   collagenase (SANTYL) 250 unit/gram ointment Apply  to affected area daily. Provider, Historical   ferrous sulfate (Slow Fe) 142 mg (45 mg iron) ER tablet Take  by mouth Daily (before breakfast). Provider, Historical   carvediloL (COREG) 25 mg tablet Take 1.5 Tabs by mouth two (2) times daily (with meals). 3/12/21   [facility] Cruise[facility] NP   atorvastatin (LIPITOR) 10 mg tablet TAKE 1 TABLET EVERY NIGHT 10/11/20   Bebeto Saucedo MD   SITagliptin (JANUVIA) 100 mg tablet Take 1 Tab by mouth daily. 1/20/17   Bebeto Saucedo MD   metFORMIN (GLUCOPHAGE) 1,000 mg tablet TAKE 1 TABLET TWICE DAILY 9/28/16   Bebeto Saucedo MD   ergocalciferol (ERGOCALCIFEROL) 50,000 unit capsule Take 1 Cap by mouth every seven (7) days.  9/17/15   Bebeto Saucedo MD   glucose blood VI test strips (ACCU-CHEK RISSA PLUS TEST STRP) strip Check glucose twice daily 7/24/15   Bebeto Saucedo MD   alcohol swabs (BD SINGLE USE SWABS REGULAR) padm Daily 10/8/14   Omi Vieira MD   aspirin delayed-release 81 mg tablet Take  by mouth daily. Provider, Historical       REVIEW OF SYSTEMS:     I am not able to complete the review of systems because: The patient is intubated and sedated    The patient has altered mental status due to his acute medical problems    The patient has baseline aphasia from prior stroke(s)   X The patient has baseline dementia and is not reliable historian    The patient is in acute medical distress and unable to provide information           Total of 12 systems reviewed as follows:       POSITIVE= underlined text  Negative = text not underlined  General:  fever, chills, sweats, generalized weakness, weight loss/gain,      loss of appetite   Eyes:    blurred vision, eye pain, loss of vision, double vision  ENT:    rhinorrhea, pharyngitis   Respiratory:   cough, sputum production, SOB, GREEN, wheezing, pleuritic pain   Cardiology:   chest pain, palpitations, orthopnea, PND, edema, syncope   Gastrointestinal:  abdominal pain , N/V, diarrhea, dysphagia, constipation, bleeding   Genitourinary:  frequency, urgency, dysuria, hematuria, incontinence   Muskuloskeletal :  arthralgia, myalgia, back pain  Hematology:  easy bruising, nose or gum bleeding, lymphadenopathy   Dermatological: rash, ulceration, pruritis, color change / jaundice  Endocrine:   hot flashes or polydipsia   Neurological:  headache, dizziness, confusion, focal weakness, paresthesia,     Speech difficulties, memory loss, gait difficulty  Psychological: Feelings of anxiety, depression, agitation    Objective:   VITALS:    Visit Vitals  /64   Pulse 83   Temp 98.2 °F (36.8 °C)   Resp 22   Ht 5' 6\" (1.676 m)   Wt 59.7 kg (131 lb 9.8 oz)   SpO2 100%   BMI 21.24 kg/m²       PHYSICAL EXAM:    General:    Alert, cooperative, no distress, appears stated age.      HEENT: Atraumatic, anicteric sclerae, pink conjunctivae     No oral ulcers, dry mucous membrane, poor dentition   Neck:  Supple, symmetrical,  thyroid: non tender  Lungs:   Clear to auscultation bilaterally. No Wheezing or Rhonchi. No rales. Chest wall:  No tenderness  No Accessory muscle use. Heart:   Regular  rhythm,  No  murmur   No edema  Abdomen:   Soft, non-tender. Not distended. Bowel sounds normal  Extremities: No cyanosis. No clubbing,      Skin turgor normal, Capillary refill normal, Radial dial pulse 2+  Skin:     Not pale. Not Jaundiced  No rashes   Psych:  No insight. Not depressed. Not anxious or agitated. Neurologic: EOMs intact. No facial asymmetry. No aphasia or slurred speech. Symmetrical strength, Sensation grossly intact. Alert and oriented to person and place.     _______________________________________________________________________  Care Plan discussed with:    Comments   Patient     Family  X    RN X    Care Manager                    Consultant:      _______________________________________________________________________  Expected  Disposition:   Home with Family X   HH/PT/OT/RN    SNF/LTC    BALBINA    ________________________________________________________________________  TOTAL TIME:  55 Minutes    Critical Care Provided     Minutes non procedure based      Comments    x Reviewed previous records   >50% of visit spent in counseling and coordination of care x Discussion with patient and/or family and questions answered       ________________________________________________________________________  Signed: Narinder Valles MD    Procedures: see electronic medical records for all procedures/Xrays and details which were not copied into this note but were reviewed prior to creation of Plan.     LAB DATA REVIEWED:    Recent Results (from the past 24 hour(s))   METABOLIC PANEL, COMPREHENSIVE    Collection Time: 03/29/21 12:46 PM   Result Value Ref Range    Sodium 147 (H) 136 - 145 mmol/L    Potassium 3.4 (L) 3.5 - 5.1 mmol/L    Chloride 117 (H) 97 - 108 mmol/L CO2 24 21 - 32 mmol/L    Anion gap 6 5 - 15 mmol/L    Glucose 92 65 - 100 mg/dL    BUN 21 (H) 6 - 20 MG/DL    Creatinine 0.90 0.55 - 1.02 MG/DL    BUN/Creatinine ratio 23 (H) 12 - 20      GFR est AA >60 >60 ml/min/1.73m2    GFR est non-AA 60 (L) >60 ml/min/1.73m2    Calcium 8.2 (L) 8.5 - 10.1 MG/DL    Bilirubin, total 0.3 0.2 - 1.0 MG/DL    ALT (SGPT) 9 (L) 12 - 78 U/L    AST (SGOT) 17 15 - 37 U/L    Alk. phosphatase 87 45 - 117 U/L    Protein, total 7.0 6.4 - 8.2 g/dL    Albumin 1.7 (L) 3.5 - 5.0 g/dL    Globulin 5.3 (H) 2.0 - 4.0 g/dL    A-G Ratio 0.3 (L) 1.1 - 2.2     SAMPLES BEING HELD    Collection Time: 03/29/21 12:46 PM   Result Value Ref Range    SAMPLES BEING HELD BLUE     COMMENT        Add-on orders for these samples will be processed based on acceptable specimen integrity and analyte stability, which may vary by analyte. CBC WITH AUTOMATED DIFF    Collection Time: 03/29/21 12:46 PM   Result Value Ref Range    WBC 15.8 (H) 3.6 - 11.0 K/uL    RBC 2.86 (L) 3.80 - 5.20 M/uL    HGB 8.3 (L) 11.5 - 16.0 g/dL    HCT 26.9 (L) 35.0 - 47.0 %    MCV 94.1 80.0 - 99.0 FL    MCH 29.0 26.0 - 34.0 PG    MCHC 30.9 30.0 - 36.5 g/dL    RDW 16.7 (H) 11.5 - 14.5 %    PLATELET 114 (H) 452 - 400 K/uL    MPV 10.2 8.9 - 12.9 FL    NRBC 0.0 0  WBC    ABSOLUTE NRBC 0.00 0.00 - 0.01 K/uL    NEUTROPHILS 86 (H) 32 - 75 %    LYMPHOCYTES 7 (L) 12 - 49 %    MONOCYTES 5 5 - 13 %    EOSINOPHILS 1 0 - 7 %    BASOPHILS 0 0 - 1 %    IMMATURE GRANULOCYTES 1 (H) 0.0 - 0.5 %    ABS. NEUTROPHILS 13.5 (H) 1.8 - 8.0 K/UL    ABS. LYMPHOCYTES 1.1 0.8 - 3.5 K/UL    ABS. MONOCYTES 0.8 0.0 - 1.0 K/UL    ABS. EOSINOPHILS 0.2 0.0 - 0.4 K/UL    ABS. BASOPHILS 0.0 0.0 - 0.1 K/UL    ABS. IMM.  GRANS. 0.2 (H) 0.00 - 0.04 K/UL    DF SMEAR SCANNED      RBC COMMENTS ANISOCYTOSIS  1+       URINALYSIS W/ REFLEX CULTURE    Collection Time: 03/29/21  4:07 PM    Specimen: Urine   Result Value Ref Range    Color YELLOW/STRAW      Appearance CLOUDY (A) CLEAR Specific gravity 1.019 1.003 - 1.030      pH (UA) 5.5 5.0 - 8.0      Protein TRACE (A) NEG mg/dL    Glucose Negative NEG mg/dL    Ketone Negative NEG mg/dL    Bilirubin Negative NEG      Blood Negative NEG      Urobilinogen 0.2 0.2 - 1.0 EU/dL    Nitrites Negative NEG      Leukocyte Esterase SMALL (A) NEG      WBC 0-4 0 - 4 /hpf    RBC 0-5 0 - 5 /hpf    Epithelial cells FEW FEW /lpf    Bacteria Negative NEG /hpf    UA:UC IF INDICATED CULTURE NOT INDICATED BY UA RESULT CNI      Mucus TRACE (A) NEG /lpf    Amorphous Crystals 1+ (A) NEG   LACTIC ACID    Collection Time: 03/29/21  7:54 PM   Result Value Ref Range    Lactic acid 1.1 0.4 - 2.0 MMOL/L

## 2021-03-30 NOTE — PROGRESS NOTES
Palliative Medicine  [place]: [phone]  [facility]: [phone]    Consult noted for both Palliative team and Hospice. Dr Trinidad Montanez has already spoken to patient's  about hospice and  was open to a hospice evaluation. At this time, Palliative team will defer to hospice for an evaluation and will remain on standby if needed. Have discussed above with Aki CAMPOS (via phone message) and have sent a message to Dr Trinidad Montanez. 3:30 pm Chart reviewed, family has decided to take patient home with [facility] hospice services 3/31. No need for Palliative to see.

## 2021-03-31 NOTE — PROGRESS NOTES
Courtesy Notification: Mrs. Veloz admitted to hospice services effective 1/31/2021 with Dr. Khari Mejia MD attending.

## 2021-03-31 NOTE — PROGRESS NOTES
Transition of Care Plan:     RUR:19%  Disposition:Home w/[facility]  Follow up appointments:n/a  DME needed:none-has DME needed  Transportation at [email or web address] 3/31  [address] or means to access home: yes       IM Medicare letter:provided 3/29  Caregiver Contact:-Josh Garza [phone]  Discharge Caregiver contacted prior to discharge? Yes    Patient is discharging home today with [facility].  in agreement w/d/c plan. No other needs or concerns identified at this time.   Patient is ready for d/c from CM standpoint    Care Management Interventions  Mode of Transport at Discharge: S  Transition of Care Consult (CM Consult): Claudine: Yes  Discharge Durable Medical Equipment: No  Physical Therapy Consult: No  Occupational Therapy Consult: No  Speech Therapy Consult: No  Current Support Network: Lives with Spouse  The Patient and/or Patient Representative was Provided with a Choice of Provider and Agrees with the Discharge Plan?: Yes  Name of the Patient Representative Who was Provided with a Choice of Provider and Agrees with the Discharge Plan: verbal-  Freedom of Choice List was Provided with Basic Dialogue that Supports the Patient's Individualized Plan of Care/Goals, Treatment Preferences and Shares the Quality Data Associated with the Providers?: Yes  Discharge Location  Discharge Placement: Home with hospice([facility])      Radha Lacy  Ext [phone]

## 2021-03-31 NOTE — DISCHARGE INSTRUCTIONS
[facility] DISCHARGE DIAGNOSIS:  Advanced decubitus (pressure ulcers) with extension to the bone  Recent fall and femur fracture status post fixation  Dehydration  Poor oral intake  Dementia      MEDICATIONS:  · It is important that you take the medication exactly as they are prescribed. · Keep your medication in the bottles provided by the pharmacist and keep a list of the medication names, dosages, and times to be taken in your wallet. · Do not take other medications without consulting your doctor. Pain Management: per above medications    What to do at Home    Recommended diet:  Dysphagia diet-pureed    Recommended activity: Activity as tolerated    If you have questions regarding the hospital related prescriptions or hospital related issues please call [facility] at . You can always direct your questions to your primary care doctor if you are unable to reach your hospital physician; your PCP works as an extension of your hospital doctor just like your hospital doctor is an extension of your PCP for your time at the hospital [facility]). If you experience any of the following symptoms then please call your primary care physician or return to the emergency room if you cannot get hold of your doctor:  Fever, chills, nausea, vomiting, diarrhea, change in mentation, falling, bleeding, shortness of breath. Wound care instructions:  1. Marks catheter to keep urinary incontinence out of wound  2. Remove staples from left hip  3. Sacral and BLE wounds: daily, cleanse by wiping clean with NS moist 4x4. s. Apply Santyl to all wounds and pack/cover wounds with NS moist Kerlix gauze only. Cover sacrum with dry 4x4's and ABD pads. Cover leg wounds with 6x6 foam dressings. 4. Specialty bed ordered- Envision on a [facility] bed frame  5. Right heel paint with betadine/povidine swabs, allow to air dry and may cover with a dry dressing (no foam) until dried up.   6. Float heels

## 2021-03-31 NOTE — PROGRESS NOTES
Problem: Falls - Risk of  Goal: *Absence of Falls  Description: Document [place] Fall Risk and appropriate interventions in the flowsheet. Outcome: Progressing Towards Goal  Note: Fall Risk Interventions:  Mobility Interventions: Bed/chair exit alarm, Communicate number of staff needed for ambulation/transfer    Mentation Interventions: Bed/chair exit alarm, Door open when patient unattended    Medication Interventions: Bed/chair exit alarm, Evaluate medications/consider consulting pharmacy    Elimination Interventions: Bed/chair exit alarm, Toileting schedule/hourly rounds              Problem: Patient Education: Go to Patient Education Activity  Goal: Patient/Family Education  Outcome: Progressing Towards Goal     Problem: Pressure Injury - Risk of  Goal: *Prevention of pressure injury  Description: Document Jake Scale and appropriate interventions in the flowsheet. Outcome: Progressing Towards Goal  Note: Pressure Injury Interventions:  Sensory Interventions: Assess changes in LOC    Moisture Interventions: Absorbent underpads, Check for incontinence Q2 hours and as needed    Activity Interventions: Pressure redistribution bed/mattress(bed type)    Mobility Interventions: Turn and reposition approx.  every two hours(pillow and wedges), Pressure redistribution bed/mattress (bed type)    Nutrition Interventions: Document food/fluid/supplement intake, Offer support with meals,snacks and hydration    Friction and Shear Interventions: Apply protective barrier, creams and emollients                Problem: Patient Education: Go to Patient Education Activity  Goal: Patient/Family Education  Outcome: Progressing Towards Goal

## 2021-03-31 NOTE — DISCHARGE SUMMARY
.                    Hospitalist Discharge Summary     Patient ID:  Nayla Goel  [id number]  73 y.o.  1935  3/29/2021    PCP on record: Judy Ba MD    Admit date: 3/29/2021  Discharge date and time: 3/31/2021    DISCHARGE DIAGNOSIS:    As bellow    CONSULTATIONS:  IP CONSULT TO GENERAL SURGERY    Excerpted HPI from H&P of Dontrell Hoffman MD:  Roberta Caal is a 82 y.o.  female who presents as recommended by her primary care provider who had noted that her white cell count has been increasing and the patient had been having worsening hypernatremia with worsening decubitus ulcers. Patient is not very much able to give history as she is demented, history was taken from medical records and her  over the phone. Patient is known to me from a previous admission after she had a fall in January and had been bedbound since was admitted in February found to have left hip fracture and that she had developed multiple pressure ulcers with significant prerenal azotemia and hyper natremia. UTI complicated with bacteremia.     Her evaluation in the ED was significant for worsening leukocytosis, recurrence of prerenal azotemia with hyponatremia, and CT scan suggestive of osteomyelitis of S5.       ______________________________________________________________________  DISCHARGE SUMMARY/HOSPITAL COURSE:  for full details see H&P, daily progress notes, labs, consult notes. Worsening decubitus ulcers  Osteomyelitis of S5 segment  Leukocytosis     Her previous evaluation was reviewed from the chart and she was noted at that time to have present on admission the following:  Sacral unstageable pressure injury   Mid upper back stage III pressure   Right heel unstageable pressure injury   Inner upper thighs incontinence MASD with partial-thickness wounds      CT of the abdomen and pelvis was reported posterior osseous erosion demonstrated in the S5 segment consistent with osteomyelitis.       3/30 Blood cultures growing GPCs     given one dose of vancomycin in ED and continued on piperacillin tazobactam  3/30 continuing vancomycin and switching to cefepime   Wound care consulted  General surgery consulted and bedside debridement done  [facility] care consulted will be going home tomorrow with hospice. Will discharge on PO Abx as bellow     Urinary retention  Evident on CT scan  will be on unger's catheter     Poor oral intake  Dehydration  Hypernatremia     Nonischemic dilated cardiomyopathy  Most recent echo with ejection fraction of 30 to 35%  Stopped BBlocker due to low blood pressure     Dementia  Bedbound  Recent left hip fracture post-ORIF     Type 2 diabetes  Not requiring treatment currently       3/29 Case discussed in detail with her  who is in agreement with the plan and was open to my suggestions for palliative care and hospice care evaluation and discussion. 3/30 met with daughter at bedside. Family agreed to return home with hospice           _______________________________________________________________________  Patient seen and examined by me on discharge day. Pertinent Findings:  Gen:    Not in distress  Chest: Clear lungs  CVS:   Regular rhythm. No edema  Abd:  Soft, not distended, not tender  Neuro:  Alert, oriented to person. No lateralizing sign  _______________________________________________________________________  DISCHARGE MEDICATIONS:   Current Discharge Medication List      START taking these medications    Details   cephALEXin (KEFLEX) 250 mg/5 mL suspension Take 5 mL by mouth four (4) times daily for 14 days. Qty: 280 mL, Refills: 0      clindamycin (CLEOCIN) 300 mg capsule Take 1 Cap by mouth three (3) times daily for 14 days. Qty: 42 Cap, Refills: 0         CONTINUE these medications which have NOT CHANGED    Details   cyanocobalamin 1,000 mcg tablet Take 1 Tab by mouth daily.   Qty: 90 Tab, Refills: 1      Accu-Chek Softclix Lancets misc TEST BLOOD SUGAR EVERY DAY  Qty: 100 Each, Refills: 2      acetaminophen (TYLENOL) 650 mg TbER Take 650 mg by mouth every eight (8) hours. multivitamin (Daily Multiple) tablet Take 1 Tab by mouth daily. collagenase (SANTYL) 250 unit/gram ointment Apply  to affected area daily. ferrous sulfate (Slow Fe) 142 mg (45 mg iron) ER tablet Take  by mouth Daily (before breakfast). atorvastatin (LIPITOR) 10 mg tablet TAKE 1 TABLET EVERY NIGHT  Qty: 90 Tab, Refills: 1      ergocalciferol (ERGOCALCIFEROL) 50,000 unit capsule Take 1 Cap by mouth every seven (7) days. Qty: 8 Cap, Refills: 0      glucose blood VI test strips (ACCU-CHEK RISSA PLUS TEST STRP) strip Check glucose twice daily  Qty: 3 Package, Refills: 3    Associated Diagnoses: Type II or unspecified type diabetes mellitus without mention of complication, not stated as uncontrolled; DM (diabetes mellitus), type 2, uncontrolled (HCC)      alcohol swabs (BD SINGLE USE SWABS REGULAR) padm Daily  Qty: 100 each, Refills: 3      aspirin delayed-release 81 mg tablet Take  by mouth daily. STOP taking these medications       furosemide (LASIX) 40 mg tablet Comments:   Reason for Stopping:         carvediloL (COREG) 25 mg tablet Comments:   Reason for Stopping:         SITagliptin (JANUVIA) 100 mg tablet Comments:   Reason for Stopping:         metFORMIN (GLUCOPHAGE) 1,000 mg tablet Comments:   Reason for Stopping:                 Patient Follow Up Instructions:    Activity: Activity as tolerated  Diet: Dysphagia diet-pureed  Wound Care: As directed        Follow-up Information     Follow up With Specialties Details Why [address] [facility] [address]    Annita Dee MD Internal Medicine   [address]  [facility] [address]  [place] [phone]          ________________________________________________________________    Risk of deterioration: Low    Condition at Discharge:  Stable  __________________________________________________________________    Disposition  Home with hospice services    ____________________________________________________________________    Code Status: DNR/DNI  ___________________________________________________________________      Total time in minutes spent coordinating this discharge (includes going over instructions, follow-up, prescriptions, and preparing report for sign off to her PCP) :  >30 minutes    Signed:  MD clarisa Horan

## 2021-03-31 NOTE — TELEPHONE ENCOUNTER
Received call from Ata Villegas. [facility]. Andreina Dooley wanted to report that the prelim of a blood culture shows MRSA by Antigen Detection. Andreina Dooley states pt was d/c'd   Andreina Dooley states that confirmation will follow.

## 2021-03-31 NOTE — PROGRESS NOTES
End of Shift Note    Bedside shift change report given to Tal Craig (oncoming nurse) by Juan Strange (offgoing nurse). Report included the following information SBAR    Shift worked:  [id number]     Shift summary and any significant changes:     Pt did not have any significant changes during shift. Pt did not have any visual ques of pain. Pt vitals at pt baseline throughout night. Pt asymptomatic with low BP throughout night. Pt dressings on legs and heels changed during shift. Could not draw pt labs due to hard stick. Vascular team to do labs today. Pt unger draining throughout night. Pt turned throughout shift. Pt rested quietly throughout shift. Concerns for physician to address:       Zone phone for oncoming shift:   [phone]       Activity:  Activity Level: Up with Assistance  Number times ambulated in hallways past shift: 0  Number of times OOB to chair past shift: 0    Cardiac:   Cardiac Monitoring: No           Access:   Current line(s): PIV     Genitourinary:   Urinary status: unger    Respiratory:   O2 Device: Room air  Chronic home O2 use?: NO  Incentive spirometer at bedside: NO     GI:  Last Bowel Movement Date: (OBED)  Current diet:  DIET DYSPHAGIA PUREED (NDD1)  Passing flatus: YES  Tolerating current diet: YES       Pain Management:   Patient states pain is manageable on current regimen: N/A    Skin:  Jake Score: 12  Interventions: turn team, float heels and internal/external urinary devices    Patient Safety:  Fall Score:  Total Score: 4  Interventions: bed/chair alarm and gripper socks  High Fall Risk: Yes    Length of Stay:  Expected LOS: 4d 19h  Actual LOS: [address] [facility]

## 2021-03-31 NOTE — PROGRESS NOTES
1010 pt discharged and left via stretcher with [facility] to home with hospice. Pt left with all personal belongings, scripts, and medications. Per Dr. Pamella Person, pt is to go home with unger in place, to keep sacral wound clean and dry.

## 2021-05-02 ENCOUNTER — HOME CARE VISIT (OUTPATIENT)
Dept: HOSPICE | Facility: HOSPICE | Age: 86
End: 2021-05-02
Payer: MEDICARE

## 2022-03-03 NOTE — ED NOTES
Patient brought back from CT via stretcher at this time. Hydroquinone Counseling:  Patient advised that medication may result in skin irritation, lightening (hypopigmentation), dryness, and burning.  In the event of skin irritation, the patient was advised to reduce the amount of the drug applied or use it less frequently.  Rarely, spots that are treated with hydroquinone can become darker (pseudoochronosis).  Should this occur, patient instructed to stop medication and call the office. The patient verbalized understanding of the proper use and possible adverse effects of hydroquinone.  All of the patient's questions and concerns were addressed.

## (undated) DEVICE — SPONGE LAP 18X18IN STRL -- 5/PK

## (undated) DEVICE — SOL IRR STRL H2O 1000ML BTL --

## (undated) DEVICE — STERILE POLYISOPRENE POWDER-FREE SURGICAL GLOVES: Brand: PROTEXIS

## (undated) DEVICE — PENCIL SMK EVAC L10FT DIA95MM TBNG NONSTICK W ADPT TO 22MM

## (undated) DEVICE — GARMENT,MEDLINE,DVT,INT,CALF,MED, GEN2: Brand: MEDLINE

## (undated) DEVICE — Device

## (undated) DEVICE — SOL IRR SOD CL 0.9% 3000ML --

## (undated) DEVICE — OPTIFOAM GENTLE SA, POSTOP, 4X8: Brand: MEDLINE

## (undated) DEVICE — REM POLYHESIVE ADULT PATIENT RETURN ELECTRODE: Brand: VALLEYLAB

## (undated) DEVICE — PREP SKN CHLRAPRP APL 26ML STR --

## (undated) DEVICE — 6619 2 PTNT ISO SYS INCISE AREA&LT;(&GT;&&LT;)&GT;P: Brand: STERI-DRAPE™ IOBAN™ 2

## (undated) DEVICE — INFECTION CONTROL KIT SYS

## (undated) DEVICE — SOL IRR SOD CL 0.9% 1000ML BTL --

## (undated) DEVICE — DRAPE XR C ARM 41X74IN LF --

## (undated) DEVICE — SUTURE VCRL SZ 1 L18IN ABSRB VLT CT-1 L36MM 1/2 CIR J741D

## (undated) DEVICE — SURGICAL PROCEDURE PACK BASIN MAJ SET CUST NO CAUT

## (undated) DEVICE — SUTURE VCRL SZ 2-0 L18IN ABSRB UD CT-1 L36MM 1/2 CIR J839D

## (undated) DEVICE — LABEL MED MRMC ORTH STRL

## (undated) DEVICE — 3M™ IOBAN™ 2 ANTIMICROBIAL INCISE DRAPE 6650EZ: Brand: IOBAN™ 2

## (undated) DEVICE — COVER,TABLE,60X90,STERILE: Brand: MEDLINE

## (undated) DEVICE — ELECTRODE BLDE L4IN NONINSULATED EDGE

## (undated) DEVICE — KIT POS FOAM HANA TBL

## (undated) DEVICE — DRAPE,U/ SHT,SPLIT,PLAS,STERIL: Brand: MEDLINE

## (undated) DEVICE — SUTURE ETHBND EXCEL SZ 5 L30IN NONABSORBABLE GRN L40MM V-37 MB66G

## (undated) DEVICE — SYR 50ML LR LCK 1ML GRAD NSAF --

## (undated) DEVICE — GOWN,SIRUS,NONRNF,SETINSLV,XL,20/CS: Brand: MEDLINE

## (undated) DEVICE — 4-PORT MANIFOLD: Brand: NEPTUNE 2

## (undated) DEVICE — Z DISCONTINUED USE 2717541 SUTURE STRATAFIX SZ 3-0 L30CM NONABSORBABLE UD L26MM FS 3/8

## (undated) DEVICE — 3M™ IOBAN™ 2 ANTIMICROBIAL INCISE DRAPE 6648EZ: Brand: IOBAN™ 2

## (undated) DEVICE — NDL SPNE QNCKE 18GX3.5IN LF --

## (undated) DEVICE — FLOSEAL HEMOSTATIC MATRIX, 10ML: Brand: FLOSEAL HEMOSTATIC MATRIX

## (undated) DEVICE — HANDPIECE SET WITH COAXIAL HIGH FLOW TIP AND SUCTION TUBE: Brand: INTERPULSE

## (undated) DEVICE — PACK,BASIC,SIRUS,V: Brand: MEDLINE

## (undated) DEVICE — HOOD: Brand: FLYTE

## (undated) DEVICE — STERILE POLYISOPRENE POWDER-FREE SURGICAL GLOVES WITH EMOLLIENT COATING: Brand: PROTEXIS